# Patient Record
Sex: FEMALE | Race: WHITE | Employment: OTHER | URBAN - METROPOLITAN AREA
[De-identification: names, ages, dates, MRNs, and addresses within clinical notes are randomized per-mention and may not be internally consistent; named-entity substitution may affect disease eponyms.]

---

## 2024-05-01 ENCOUNTER — APPOINTMENT (OUTPATIENT)
Dept: CT IMAGING | Age: 59
End: 2024-05-01
Payer: MEDICAID

## 2024-05-01 ENCOUNTER — APPOINTMENT (OUTPATIENT)
Dept: GENERAL RADIOLOGY | Age: 59
End: 2024-05-01
Payer: MEDICAID

## 2024-05-01 ENCOUNTER — HOSPITAL ENCOUNTER (EMERGENCY)
Age: 59
Discharge: HOME | End: 2024-05-01
Payer: MEDICAID

## 2024-05-01 VITALS
SYSTOLIC BLOOD PRESSURE: 134 MMHG | HEART RATE: 90 BPM | DIASTOLIC BLOOD PRESSURE: 90 MMHG | OXYGEN SATURATION: 95 % | BODY MASS INDEX: 36.29 KG/M2 | HEIGHT: 69 IN | TEMPERATURE: 99 F | RESPIRATION RATE: 17 BRPM | WEIGHT: 245 LBS

## 2024-05-01 DIAGNOSIS — R31.9 HEMATURIA, UNSPECIFIED TYPE: ICD-10-CM

## 2024-05-01 DIAGNOSIS — M54.50 ACUTE BILATERAL LOW BACK PAIN WITHOUT SCIATICA: Primary | ICD-10-CM

## 2024-05-01 DIAGNOSIS — K74.60 HEPATIC CIRRHOSIS, UNSPECIFIED HEPATIC CIRRHOSIS TYPE, UNSPECIFIED WHETHER ASCITES PRESENT (HCC): ICD-10-CM

## 2024-05-01 DIAGNOSIS — N39.0 URINARY TRACT INFECTION IN FEMALE: ICD-10-CM

## 2024-05-01 DIAGNOSIS — Z75.8 DOES NOT HAVE PRIMARY CARE PROVIDER: ICD-10-CM

## 2024-05-01 LAB
APPEARANCE UR: CLEAR
BACTERIA URNS QL MICRO: ABNORMAL /HPF
BILIRUB UR QL: ABNORMAL
CASTS URNS QL MICRO: 0 /LPF
COLOR UR: YELLOW
CRYSTALS URNS QL MICRO: 0 /LPF
EPI CELLS #/AREA URNS HPF: 0 /HPF
GLUCOSE UR STRIP.AUTO-MCNC: 100 MG/DL
HGB UR QL STRIP: NEGATIVE
KETONES UR QL STRIP.AUTO: ABNORMAL MG/DL
LEUKOCYTE ESTERASE UR QL STRIP.AUTO: NEGATIVE
MUCOUS THREADS URNS QL MICRO: 0 /LPF
NITRITE UR QL STRIP.AUTO: POSITIVE
OTHER OBSERVATIONS: ABNORMAL
PH UR STRIP: 5.5 (ref 5–9)
PROT UR STRIP-MCNC: 100 MG/DL
RBC #/AREA URNS HPF: ABNORMAL /HPF
SP GR UR REFRACTOMETRY: >=1.03 (ref 1–1.02)
UROBILINOGEN UR QL STRIP.AUTO: >=8 EU/DL (ref 0.2–1)
WBC URNS QL MICRO: ABNORMAL /HPF

## 2024-05-01 PROCEDURE — 87086 URINE CULTURE/COLONY COUNT: CPT

## 2024-05-01 PROCEDURE — 81001 URINALYSIS AUTO W/SCOPE: CPT

## 2024-05-01 PROCEDURE — 99284 EMERGENCY DEPT VISIT MOD MDM: CPT

## 2024-05-01 PROCEDURE — 72100 X-RAY EXAM L-S SPINE 2/3 VWS: CPT

## 2024-05-01 PROCEDURE — 6370000000 HC RX 637 (ALT 250 FOR IP): Performed by: NURSE PRACTITIONER

## 2024-05-01 PROCEDURE — 6360000002 HC RX W HCPCS: Performed by: NURSE PRACTITIONER

## 2024-05-01 PROCEDURE — 74176 CT ABD & PELVIS W/O CONTRAST: CPT

## 2024-05-01 PROCEDURE — 96372 THER/PROPH/DIAG INJ SC/IM: CPT

## 2024-05-01 RX ORDER — KETOROLAC TROMETHAMINE 15 MG/ML
15 INJECTION, SOLUTION INTRAMUSCULAR; INTRAVENOUS ONCE
Status: COMPLETED | OUTPATIENT
Start: 2024-05-01 | End: 2024-05-01

## 2024-05-01 RX ORDER — OXYCODONE HYDROCHLORIDE 5 MG/1
10 TABLET ORAL ONCE
Status: COMPLETED | OUTPATIENT
Start: 2024-05-01 | End: 2024-05-01

## 2024-05-01 RX ORDER — CEFPODOXIME PROXETIL 100 MG/1
200 TABLET, FILM COATED ORAL 2 TIMES DAILY
Qty: 40 TABLET | Refills: 0 | Status: SHIPPED | OUTPATIENT
Start: 2024-05-01 | End: 2024-05-11

## 2024-05-01 RX ORDER — OXYCODONE HYDROCHLORIDE 5 MG/1
5 TABLET ORAL EVERY 8 HOURS PRN
Qty: 9 TABLET | Refills: 0 | Status: SHIPPED | OUTPATIENT
Start: 2024-05-01 | End: 2024-05-04

## 2024-05-01 RX ADMIN — KETOROLAC TROMETHAMINE 15 MG: 15 INJECTION, SOLUTION INTRAMUSCULAR; INTRAVENOUS at 16:58

## 2024-05-01 RX ADMIN — OXYCODONE 10 MG: 5 TABLET ORAL at 16:57

## 2024-05-01 ASSESSMENT — PAIN SCALES - GENERAL
PAINLEVEL_OUTOF10: 10
PAINLEVEL_OUTOF10: 10

## 2024-05-01 ASSESSMENT — PAIN DESCRIPTION - ORIENTATION: ORIENTATION: LOWER

## 2024-05-01 ASSESSMENT — PAIN DESCRIPTION - LOCATION: LOCATION: BACK

## 2024-05-01 ASSESSMENT — PAIN - FUNCTIONAL ASSESSMENT: PAIN_FUNCTIONAL_ASSESSMENT: 0-10
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
## 2024-05-01 NOTE — ED PROVIDER NOTES
Positive (A) NEG      Leukocyte Esterase, Urine Negative NEG     Urinalysis, Micro   Result Value Ref Range    WBC, UA 0-3 0 /hpf    RBC, UA 0-3 0 /hpf    Epithelial Cells UA 0 0 /hpf    BACTERIA, URINE 1+ (H) 0 /hpf    Casts 0 0 /lpf    Crystals 0 0 /LPF    Mucus, UA 0 0 /lpf    Other observations RESULTS VERIFIED MANUALLY           CT ABDOMEN PELVIS RENAL STONE   Final Result   No acute findings. Cirrhosis of the liver noted with evidence of   portal hypertension      Cholelithiasis         ** If there are any questions about this report, I can be reached on   [employer]**      XR LUMBAR SPINE (2-3 VIEWS)   Final Result   No acute radiographic abnormality.                      No results for input(s): \"COVID19\" in the last 72 hours.    Voice dictation software was used during the making of this note.  This software is not perfect and grammatical and other typographical errors may be present.  This note has not been completely proofread for errors.     Cornel Beal, JULIA - CNP  05/01/24 2021

## 2024-05-04 LAB
BACTERIA SPEC CULT: NORMAL
BACTERIA SPEC CULT: NORMAL
SERVICE CMNT-IMP: NORMAL

## 2024-05-04 NOTE — ED NOTES
Phone call received from Serge in [facility] regarding patient's prescription for oxycodone 5 from the ER yesterday.    Patient just filled her prescription of oxycodone 30 mg tablets.    I instructed the pharmacist to discard her prescription for oxycodone fives     Timoteo Funes MD  05/03/24 2002

## 2024-05-18 ENCOUNTER — HOSPITAL ENCOUNTER (EMERGENCY)
Age: 59
Discharge: HOME | End: 2024-05-19
Attending: EMERGENCY MEDICINE
Payer: MEDICAID

## 2024-05-18 DIAGNOSIS — S39.012A STRAIN OF LUMBAR REGION, INITIAL ENCOUNTER: Primary | ICD-10-CM

## 2024-05-18 PROCEDURE — 96372 THER/PROPH/DIAG INJ SC/IM: CPT

## 2024-05-18 PROCEDURE — 99284 EMERGENCY DEPT VISIT MOD MDM: CPT

## 2024-05-18 ASSESSMENT — PAIN DESCRIPTION - LOCATION: LOCATION: BACK

## 2024-05-18 ASSESSMENT — PAIN SCALES - GENERAL: PAINLEVEL_OUTOF10: 10

## 2024-05-18 ASSESSMENT — PAIN DESCRIPTION - DESCRIPTORS: DESCRIPTORS: THROBBING

## 2024-05-18 ASSESSMENT — PAIN - FUNCTIONAL ASSESSMENT: PAIN_FUNCTIONAL_ASSESSMENT: 0-10

## 2024-05-18 ASSESSMENT — PAIN DESCRIPTION - ORIENTATION: ORIENTATION: LOWER

## 2024-05-19 VITALS
TEMPERATURE: 99.3 F | OXYGEN SATURATION: 98 % | WEIGHT: 245 LBS | SYSTOLIC BLOOD PRESSURE: 129 MMHG | DIASTOLIC BLOOD PRESSURE: 82 MMHG | BODY MASS INDEX: 36.29 KG/M2 | RESPIRATION RATE: 18 BRPM | HEIGHT: 69 IN | HEART RATE: 99 BPM

## 2024-05-19 PROCEDURE — 6360000002 HC RX W HCPCS: Performed by: EMERGENCY MEDICINE

## 2024-05-19 RX ORDER — KETOROLAC TROMETHAMINE 30 MG/ML
30 INJECTION, SOLUTION INTRAMUSCULAR; INTRAVENOUS
Status: COMPLETED | OUTPATIENT
Start: 2024-05-19 | End: 2024-05-19

## 2024-05-19 RX ORDER — DEXAMETHASONE 6 MG/1
6 TABLET ORAL DAILY
Qty: 4 TABLET | Refills: 0 | Status: SHIPPED | OUTPATIENT
Start: 2024-05-19 | End: 2024-05-23

## 2024-05-19 RX ORDER — METHOCARBAMOL 750 MG/1
750 TABLET, FILM COATED ORAL 4 TIMES DAILY
Qty: 20 TABLET | Refills: 2 | Status: SHIPPED | OUTPATIENT
Start: 2024-05-19 | End: 2024-05-29

## 2024-05-19 RX ORDER — DEXAMETHASONE 4 MG/1
6 TABLET ORAL
Status: COMPLETED | OUTPATIENT
Start: 2024-05-19 | End: 2024-05-19

## 2024-05-19 RX ADMIN — DEXAMETHASONE 6 MG: 4 TABLET ORAL at 00:27

## 2024-05-19 RX ADMIN — KETOROLAC TROMETHAMINE 30 MG: 30 INJECTION, SOLUTION INTRAMUSCULAR at 00:46

## 2024-05-19 NOTE — ED PROVIDER NOTES
Behavior normal.        Procedures     Procedures    Orders Placed This Encounter   Procedures    MRI LUMBAR SPINE WO CONTRAST        Medications given during this emergency department visit:  Medications   dexAMETHasone (DECADRON) tablet 6 mg (has no administration in time range)       New Prescriptions    DEXAMETHASONE (DECADRON) 6 MG TABLET    Take 1 tablet by mouth daily for 4 days    METHOCARBAMOL (ROBAXIN-750) 750 MG TABLET    Take 1 tablet by mouth 4 times daily for 10 days        No past medical history on file.     No past surgical history on file.     Social History     Socioeconomic History    Marital status: Legally         Previous Medications    No medications on file        No results found for any visits on 05/18/24.      MRI LUMBAR SPINE WO CONTRAST    (Results Pending)                No results for input(s): \"COVID19\" in the last 72 hours.    Voice dictation software was used during the making of this note.  This software is not perfect and grammatical and other typographical errors may be present.  This note has not been completely proofread for errors.     Angel Samuels MD  05/19/24 0025

## 2024-05-19 NOTE — DISCHARGE INSTRUCTIONS
Follow-up with your doctor in [place] after your outpatient MRI.  Return to the emergency department if your symptoms worsen despite the prescribed medicines.

## 2024-05-19 NOTE — ED TRIAGE NOTES
Patient in w/c into triage.  Patient states she has been in pain in lower back.  Patient was here on 5/1 w/back pain but was diagnosed w/UTI.  She finished all her antibiotic and her back is in more pain. Pain is 10/10.  Patient has been taking her oxycodone 30mg 5xday and pain is still 10/10. Patient states having trouble walking the pain is so intense.

## 2024-05-28 ENCOUNTER — TELEPHONE (OUTPATIENT)
Dept: UROLOGY | Age: 59
End: 2024-05-28

## 2024-05-30 ENCOUNTER — HOSPITAL ENCOUNTER (EMERGENCY)
Age: 59
Discharge: HOME | End: 2024-05-31
Attending: EMERGENCY MEDICINE
Payer: MEDICAID

## 2024-05-30 VITALS
TEMPERATURE: 98.6 F | SYSTOLIC BLOOD PRESSURE: 133 MMHG | RESPIRATION RATE: 18 BRPM | HEART RATE: 99 BPM | OXYGEN SATURATION: 98 % | BODY MASS INDEX: 36.36 KG/M2 | WEIGHT: 246.2 LBS | DIASTOLIC BLOOD PRESSURE: 89 MMHG

## 2024-05-30 DIAGNOSIS — G89.29 CHRONIC BILATERAL LOW BACK PAIN, UNSPECIFIED WHETHER SCIATICA PRESENT: Primary | ICD-10-CM

## 2024-05-30 DIAGNOSIS — M54.50 CHRONIC BILATERAL LOW BACK PAIN, UNSPECIFIED WHETHER SCIATICA PRESENT: Primary | ICD-10-CM

## 2024-05-30 PROCEDURE — 99284 EMERGENCY DEPT VISIT MOD MDM: CPT

## 2024-05-30 ASSESSMENT — PAIN - FUNCTIONAL ASSESSMENT: PAIN_FUNCTIONAL_ASSESSMENT: 0-10

## 2024-05-30 ASSESSMENT — PAIN SCALES - GENERAL: PAINLEVEL_OUTOF10: 10

## 2024-05-31 LAB
APPEARANCE UR: CLEAR
BILIRUB UR QL: NEGATIVE
COLOR UR: YELLOW
GLUCOSE UR STRIP.AUTO-MCNC: 1000 MG/DL
HGB UR QL STRIP: NEGATIVE
KETONES UR QL STRIP.AUTO: NEGATIVE MG/DL
LEUKOCYTE ESTERASE UR QL STRIP.AUTO: NEGATIVE
NITRITE UR QL STRIP.AUTO: NEGATIVE
PH UR STRIP: 6 (ref 5–9)
PROT UR STRIP-MCNC: NEGATIVE MG/DL
SP GR UR REFRACTOMETRY: 1.01 (ref 1–1.02)
UROBILINOGEN UR QL STRIP.AUTO: 1 EU/DL (ref 0.2–1)

## 2024-05-31 PROCEDURE — 6370000000 HC RX 637 (ALT 250 FOR IP): Performed by: EMERGENCY MEDICINE

## 2024-05-31 PROCEDURE — 81003 URINALYSIS AUTO W/O SCOPE: CPT

## 2024-05-31 PROCEDURE — 6360000002 HC RX W HCPCS: Performed by: EMERGENCY MEDICINE

## 2024-05-31 PROCEDURE — 96372 THER/PROPH/DIAG INJ SC/IM: CPT

## 2024-05-31 RX ORDER — LIDOCAINE 4 G/G
1 PATCH TOPICAL
Status: DISCONTINUED | OUTPATIENT
Start: 2024-05-31 | End: 2024-05-31 | Stop reason: HOSPADM

## 2024-05-31 RX ORDER — KETOROLAC TROMETHAMINE 15 MG/ML
15 INJECTION, SOLUTION INTRAMUSCULAR; INTRAVENOUS ONCE
Status: COMPLETED | OUTPATIENT
Start: 2024-05-31 | End: 2024-05-31

## 2024-05-31 RX ADMIN — KETOROLAC TROMETHAMINE 15 MG: 15 INJECTION, SOLUTION INTRAMUSCULAR; INTRAVENOUS at 01:30

## 2024-05-31 NOTE — ED TRIAGE NOTES
Pt coming in for chronic back pain that she hasn't been able to get her MRI for. Pt states she got her MRI cancelled today. Pt states she's coming in for MRI here tonight.

## 2024-05-31 NOTE — DISCHARGE INSTRUCTIONS
If you have leg weakness, cannot control your bowel or bladder, or if you have any other concerning symptoms, please return to the ER immediately.

## 2024-05-31 NOTE — ED PROVIDER NOTES
Emergency Department Provider Note       PCP: No, Pcp   Age: 59 y.o.   Sex: adult     DISPOSITION Decision To Discharge 05/31/2024 01:29:21 AM       ICD-10-CM    1. Chronic bilateral low back pain, unspecified whether sciatica present  M54.50     G89.29           Medical Decision Making     Patient comes to the ED  for evaluation of lumbar back pain that has been ongoing for the past 2 to 3 months.  Patient denies any new injury.  She denies any UTI symptoms.  Patient is asking for an MRI, stating she has been unable to obtain this imaging study otherwise.  Patient denies leg weakness.  She is ambulatory without difficulty.  No saddle paresthesias or signs of cauda equina.  Patient without abdominal TTP.  Urinalysis unremarkable.  Patient given Lidoderm patch and Toradol injection.  She is stable for DC at this time.  Strict return precautions discussed.     1 chronic illness with exacerbation.    Over the counter drug management performed.  Shared medical decision making was utilized in creating the patients health plan today.    I independently ordered and reviewed each unique test.    I reviewed external records: Patient seen in ED on 5/18/2024 and on 5/1/2024.  Both visits for back pain.          History     Patient comes to the ED  for evaluation of lumbar back pain that has been ongoing for the past 2 to 3 months.  Patient denies any new injury.  She denies any UTI symptoms.  Patient is asking for an MRI, stating she has been unable to obtain this imaging study otherwise.  Patient denies leg weakness.  She is ambulatory without difficulty.  No saddle paresthesias or signs of cauda equina.  Patient without abdominal TTP.    The history is provided by the patient and medical records. No  was used.     Physical Exam     Vitals signs and nursing note reviewed:  Vitals:    05/30/24 2012   BP: 133/89   Pulse: 99   Resp: 18   Temp: 98.6 °F (37 °C)   TempSrc: Oral   SpO2: 98%   Weight: 111.7 kg  Pt hourly rounding competed. Safety   Pt (x) resting on stretcher with side rails up and call bell in reach. () in chair    () in parents arms. Toileting   Pt offered ()Bedpan     ()Assistance to Restroom     ()Urinal  Ongoing Updates  Updated on plan of care and status of test results.   Pain Management  Inquired as to comfort and offered comfort measures:    () warm blankets   () dimmed lights (246 lb 3.2 oz)      Physical Exam  Vitals and nursing note reviewed.   Constitutional:       General: She is not in acute distress.     Appearance: Normal appearance. She is not ill-appearing or toxic-appearing.   HENT:      Head: Normocephalic and atraumatic.      Nose: Nose normal.      Mouth/Throat:      Mouth: Mucous membranes are moist.   Eyes:      Extraocular Movements: Extraocular movements intact.   Cardiovascular:      Rate and Rhythm: Normal rate.   Pulmonary:      Effort: Pulmonary effort is normal. No respiratory distress.   Abdominal:      Palpations: Abdomen is soft.   Musculoskeletal:         General: Normal range of motion.      Cervical back: Normal range of motion.   Skin:     General: Skin is warm and dry.      Capillary Refill: Capillary refill takes less than 2 seconds.   Neurological:      General: No focal deficit present.      Mental Status: She is alert and oriented to person, place, and time.      Cranial Nerves: No cranial nerve deficit.      Motor: No weakness.      Gait: Gait normal.   Psychiatric:         Mood and Affect: Mood is anxious.        Procedures     Procedures    Orders Placed This Encounter   Procedures    Urinalysis        Medications given during this emergency department visit:  Medications   lidocaine 4 % external patch 1 patch (1 patch TransDERmal Patch Applied 5/31/24 0108)   ketorolac (TORADOL) injection 15 mg (15 mg IntraMUSCular Given 5/31/24 0130)       New Prescriptions    No medications on file        No past medical history on file.     No past surgical history on file.     Social History     Socioeconomic History    Marital status:         Previous Medications    No medications on file        Results for orders placed or performed during the hospital encounter of 05/30/24   Urinalysis   Result Value Ref Range    Color, UA YELLOW      Appearance CLEAR      Specific Gravity, UA 1.015 1.001 - 1.023      pH, Urine 6.0 5.0 - 9.0      Protein, UA Negative

## 2024-06-13 ENCOUNTER — HOSPITAL ENCOUNTER (EMERGENCY)
Age: 59
Discharge: HOME | End: 2024-06-13
Attending: EMERGENCY MEDICINE
Payer: MEDICAID

## 2024-06-13 ENCOUNTER — APPOINTMENT (OUTPATIENT)
Dept: ULTRASOUND IMAGING | Age: 59
End: 2024-06-13
Payer: MEDICAID

## 2024-06-13 VITALS
SYSTOLIC BLOOD PRESSURE: 106 MMHG | TEMPERATURE: 98.7 F | OXYGEN SATURATION: 96 % | DIASTOLIC BLOOD PRESSURE: 65 MMHG | WEIGHT: 251 LBS | RESPIRATION RATE: 16 BRPM | HEART RATE: 87 BPM | BODY MASS INDEX: 37.07 KG/M2

## 2024-06-13 DIAGNOSIS — M79.605 PAIN OF LEFT LOWER EXTREMITY: Primary | ICD-10-CM

## 2024-06-13 DIAGNOSIS — M54.32 SCIATICA OF LEFT SIDE: ICD-10-CM

## 2024-06-13 PROCEDURE — 96372 THER/PROPH/DIAG INJ SC/IM: CPT

## 2024-06-13 PROCEDURE — 93971 EXTREMITY STUDY: CPT

## 2024-06-13 PROCEDURE — 99284 EMERGENCY DEPT VISIT MOD MDM: CPT

## 2024-06-13 PROCEDURE — 6360000002 HC RX W HCPCS: Performed by: EMERGENCY MEDICINE

## 2024-06-13 RX ORDER — KETOROLAC TROMETHAMINE 30 MG/ML
60 INJECTION, SOLUTION INTRAMUSCULAR; INTRAVENOUS
Status: DISCONTINUED | OUTPATIENT
Start: 2024-06-13 | End: 2024-06-13

## 2024-06-13 RX ORDER — KETOROLAC TROMETHAMINE 30 MG/ML
30 INJECTION, SOLUTION INTRAMUSCULAR; INTRAVENOUS
Status: COMPLETED | OUTPATIENT
Start: 2024-06-13 | End: 2024-06-13

## 2024-06-13 RX ADMIN — KETOROLAC TROMETHAMINE 30 MG: 30 INJECTION, SOLUTION INTRAMUSCULAR at 16:36

## 2024-06-13 ASSESSMENT — ENCOUNTER SYMPTOMS
ABDOMINAL DISTENTION: 0
WHEEZING: 0
CHEST TIGHTNESS: 0
CONSTIPATION: 0
COLOR CHANGE: 0
EYE REDNESS: 0
COUGH: 0
VOICE CHANGE: 0
BACK PAIN: 0
ABDOMINAL PAIN: 0

## 2024-06-13 ASSESSMENT — PAIN - FUNCTIONAL ASSESSMENT: PAIN_FUNCTIONAL_ASSESSMENT: 0-10

## 2024-06-13 ASSESSMENT — PAIN SCALES - GENERAL
PAINLEVEL_OUTOF10: 10
PAINLEVEL_OUTOF10: 10

## 2024-06-13 NOTE — DISCHARGE INSTRUCTIONS
As discussed ultrasound done today shows no signs of any serious or life-threatening illnesses.  I believe the pain in your leg is related to your chronic back issue  I placed referral with orthopedic spine service  Return to the ER for any new, worsening or life-threatening send

## 2024-06-13 NOTE — ED PROVIDER NOTES
Emergency Department Provider Note       PCP: No, Pcp   Age: 59 y.o.   Sex: adult     DISPOSITION Decision To Discharge 06/13/2024 05:43:30 PM       ICD-10-CM    1. Pain of left lower extremity  M79.605       2. Sciatica of left side  M54.32 [facility] (Spine Surgery)          Medical Decision Making     Patient is  well-appearing.  Is neurovascular intact distally.  Possibly some left lower extremity isolated swelling.  Therefore will obtain ultrasound to rule out DVT.  She clinically is well-appearing.  Sitting up eating fast food upon my history and physical    5:44 PM EDT  Ultrasound is negative for DVT.  Discussed with patient how her symptoms are likely related to her chronic back disease and her sciatica    She reports her MRI of her spine done recently voicing concerns were just inflammation versus infection.    She does not have the report but has a message sent from her PCP and says that there was questionable discitis but no signs of epidural abscess or hematoma.    The nature of her pain has been chronic.  She denies any fevers or chills.  Do not feel she needs any workup for this.  Her PCP did place referral for spine.  I will give her information placed referral for spine surgery through our system.  However not feel she needs any further testing at this time given her well appearance    Pain is completely resolved after dose of IM Toradol    Return to the ER for any new, worsening or life-threatening symptoms     1 chronic illness with exacerbation.  Chronic medical problems impacting care include chronic back pain.    I independently ordered and reviewed each unique test.  I reviewed external records: ED visit note from an outside group.  I reviewed external records: provider visit note from PCP.  I reviewed external records: provider visit note from outside specialist.  I reviewed external records: previous EKG including cardiologist interpretation.    I reviewed

## 2024-06-13 NOTE — ED TRIAGE NOTES
Pt coming in for back pain related to possible infection according to her MRI results per Pt. Pt also here for blood clot rule out of her left leg. Pt states it has become swollen and has fire to it whenever she gets a shower. Pt ambulatory with steady gait in triage.

## 2024-07-13 ENCOUNTER — APPOINTMENT (OUTPATIENT)
Dept: GENERAL RADIOLOGY | Age: 59
End: 2024-07-13
Payer: MEDICAID

## 2024-07-13 ENCOUNTER — HOSPITAL ENCOUNTER (EMERGENCY)
Age: 59
Discharge: HOME | End: 2024-07-13
Payer: MEDICAID

## 2024-07-13 ENCOUNTER — APPOINTMENT (OUTPATIENT)
Dept: CT IMAGING | Age: 59
End: 2024-07-13
Payer: MEDICAID

## 2024-07-13 VITALS
OXYGEN SATURATION: 98 % | SYSTOLIC BLOOD PRESSURE: 138 MMHG | WEIGHT: 240 LBS | BODY MASS INDEX: 35.55 KG/M2 | HEART RATE: 88 BPM | HEIGHT: 69 IN | RESPIRATION RATE: 17 BRPM | DIASTOLIC BLOOD PRESSURE: 88 MMHG | TEMPERATURE: 99.3 F

## 2024-07-13 DIAGNOSIS — S92.352A DISPLACED FRACTURE OF FIFTH METATARSAL BONE, LEFT FOOT, INITIAL ENCOUNTER FOR CLOSED FRACTURE: ICD-10-CM

## 2024-07-13 DIAGNOSIS — S09.90XA INJURY OF HEAD, INITIAL ENCOUNTER: Primary | ICD-10-CM

## 2024-07-13 DIAGNOSIS — M25.571 ACUTE RIGHT ANKLE PAIN: ICD-10-CM

## 2024-07-13 DIAGNOSIS — S92.505A CLOSED NONDISPLACED FRACTURE OF PHALANX OF LESSER TOE OF LEFT FOOT, UNSPECIFIED PHALANX, INITIAL ENCOUNTER: ICD-10-CM

## 2024-07-13 PROCEDURE — 73630 X-RAY EXAM OF FOOT: CPT

## 2024-07-13 PROCEDURE — 6370000000 HC RX 637 (ALT 250 FOR IP): Performed by: NURSE PRACTITIONER

## 2024-07-13 PROCEDURE — 73610 X-RAY EXAM OF ANKLE: CPT

## 2024-07-13 PROCEDURE — 70450 CT HEAD/BRAIN W/O DYE: CPT

## 2024-07-13 PROCEDURE — 99284 EMERGENCY DEPT VISIT MOD MDM: CPT

## 2024-07-13 RX ORDER — ACETAMINOPHEN 500 MG
1000 TABLET ORAL
Status: COMPLETED | OUTPATIENT
Start: 2024-07-13 | End: 2024-07-13

## 2024-07-13 RX ORDER — OXYCODONE HYDROCHLORIDE 5 MG/1
5 TABLET ORAL
Status: COMPLETED | OUTPATIENT
Start: 2024-07-13 | End: 2024-07-13

## 2024-07-13 RX ORDER — HYDROCODONE BITARTRATE AND ACETAMINOPHEN 5; 325 MG/1; MG/1
1 TABLET ORAL EVERY 8 HOURS PRN
Qty: 10 TABLET | Refills: 0 | Status: SHIPPED | OUTPATIENT
Start: 2024-07-13 | End: 2024-07-16

## 2024-07-13 RX ORDER — KETOROLAC TROMETHAMINE 15 MG/ML
15 INJECTION, SOLUTION INTRAMUSCULAR; INTRAVENOUS ONCE
Status: DISCONTINUED | OUTPATIENT
Start: 2024-07-13 | End: 2024-07-13

## 2024-07-13 RX ADMIN — ACETAMINOPHEN 1000 MG: 500 TABLET ORAL at 17:09

## 2024-07-13 RX ADMIN — OXYCODONE 5 MG: 5 TABLET ORAL at 19:30

## 2024-07-13 ASSESSMENT — PAIN SCALES - GENERAL
PAINLEVEL_OUTOF10: 9
PAINLEVEL_OUTOF10: 8
PAINLEVEL_OUTOF10: 9

## 2024-07-13 ASSESSMENT — PAIN DESCRIPTION - LOCATION: LOCATION: ANKLE;TOE (COMMENT WHICH ONE)

## 2024-07-13 ASSESSMENT — PAIN - FUNCTIONAL ASSESSMENT: PAIN_FUNCTIONAL_ASSESSMENT: 0-10

## 2024-07-13 NOTE — DISCHARGE INSTRUCTIONS
Take medications as prescribed.  Follow-up with recommended provider in the next 1-2 days.  Return to the ED immediately for any new, worsening, concerning symptoms; or for danger signs as discussed.

## 2024-07-13 NOTE — ED TRIAGE NOTES
Pt ambulatory to triage with steady gait. Pt reports right ankle pain that shoots up the leg x2 days and left 4th and 5th toe pain that radiates up the foot and bruising that started yesterday. Pt states \"I thinks I bumped it into something.\" Pt states she took 2 tylenol at 1400 today

## 2024-07-14 NOTE — ED PROVIDER NOTES
Emergency Department Provider Note       PCP: No, Pcp   Age: 59 y.o.   Sex: adult     DISPOSITION Decision To Discharge 07/13/2024 08:08:36 PM       ICD-10-CM    1. Injury of head, initial encounter  S09.90XA       2. Acute right ankle pain  M25.571       3. Closed nondisplaced fracture of phalanx of lesser toe of left foot, unspecified phalanx, initial encounter  S92.505A HYDROcodone-acetaminophen (NORCO) 5-325 MG per tablet     [facility]      4. Displaced fracture of fifth metatarsal bone, left foot, initial encounter for closed fracture  S92.352A HYDROcodone-acetaminophen (NORCO) 5-325 MG per tablet     [facility]          Medical Decision Making     As in HPI.  States extension injury several days ago.  Denies knowledge of other.  No neck or back pain.  No chest or abdominal pain.  No shortness of breath.  No nausea or vomiting.  No dizziness or lightheadedness.  Denies falls.  No neck or back pain.  Pleasant well-appearing.  Conversational and jovial, eating and drinking, ambulatory without difficulty.  Lengthy discussion with patient regarding her injuries, she denies any concerns for safety, states she is safe at home and declines offer of any further assistance regarding this.  X-ray was obtained of the left foot and the right ankle.  There is fracture at the base of the left fourth toe and radiologist reads healed fracture of the left fifth metatarsal, though with injuries and tenderness at site I do have concern for acute injury.  Discussed with ED attending regarding management.  No acute process noted on x-ray of the right ankle and CT of the head was added on and there is no acute process.  Toe fracture managed with mi tape and placed in walking boot, provided crutches and advised to not bear weight on the affected foot until follow-up with orthopedics.  Have referred to orthopedics for close follow-up.  Will prescribe short course of

## 2024-07-15 ENCOUNTER — TELEPHONE (OUTPATIENT)
Dept: ORTHOPEDIC SURGERY | Age: 59
End: 2024-07-15

## 2024-07-16 ENCOUNTER — TELEPHONE (OUTPATIENT)
Dept: ORTHOPEDIC SURGERY | Age: 59
End: 2024-07-16

## 2024-08-26 ENCOUNTER — HOSPITAL ENCOUNTER (EMERGENCY)
Age: 59
Discharge: HOME | End: 2024-08-27
Attending: EMERGENCY MEDICINE
Payer: MEDICAID

## 2024-08-26 VITALS
TEMPERATURE: 99.2 F | WEIGHT: 230 LBS | DIASTOLIC BLOOD PRESSURE: 66 MMHG | RESPIRATION RATE: 19 BRPM | SYSTOLIC BLOOD PRESSURE: 131 MMHG | BODY MASS INDEX: 34.07 KG/M2 | HEIGHT: 69 IN | OXYGEN SATURATION: 98 % | HEART RATE: 87 BPM
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
## 2024-08-26 PROCEDURE — 99284 EMERGENCY DEPT VISIT MOD MDM: CPT

## 2024-08-26 ASSESSMENT — PAIN SCALES - GENERAL: PAINLEVEL_OUTOF10: 10

## 2024-08-26 ASSESSMENT — PAIN - FUNCTIONAL ASSESSMENT: PAIN_FUNCTIONAL_ASSESSMENT: 0-10

## 2024-08-27 PROCEDURE — 90471 IMMUNIZATION ADMIN: CPT | Performed by: EMERGENCY MEDICINE

## 2024-08-27 PROCEDURE — 6370000000 HC RX 637 (ALT 250 FOR IP): Performed by: EMERGENCY MEDICINE

## 2024-08-27 PROCEDURE — 90714 TD VACC NO PRESV 7 YRS+ IM: CPT | Performed by: EMERGENCY MEDICINE

## 2024-08-27 PROCEDURE — 6360000002 HC RX W HCPCS: Performed by: EMERGENCY MEDICINE

## 2024-08-27 RX ADMIN — AMOXICILLIN AND CLAVULANATE POTASSIUM 1 TABLET: 875; 125 TABLET, FILM COATED ORAL at 00:53

## 2024-08-27 RX ADMIN — CLOSTRIDIUM TETANI TOXOID ANTIGEN (FORMALDEHYDE INACTIVATED) AND CORYNEBACTERIUM DIPHTHERIAE TOXOID ANTIGEN (FORMALDEHYDE INACTIVATED) 0.5 ML: 5; 2 INJECTION, SUSPENSION INTRAMUSCULAR at 00:53

## 2024-08-27 NOTE — ED TRIAGE NOTES
Pt ambulatory to triage with steady gait by self. Pt c/o dog bite to right lower leg. Bite acquired on Friday night. Pt presents with puncture would and redness to right dorsal knee. Unknown if dog up to date of vaccines. Unknown last tetanus.

## 2024-08-27 NOTE — ED PROVIDER NOTES
Emergency Department Provider Note       PCP: No, Pcp   Age: 59 y.o.   Sex: adult     DISPOSITION Decision To Discharge 08/27/2024 12:41:56 AM  Condition at Disposition: Stable       ICD-10-CM    1. Dog bite, initial encounter  W54.0XXA           Medical Decision Making     Patient comes to the ED for evaluation of a dog bite that occurred 4 days ago.  Patient states she went with her boyfriend to one of his friends houses (patient does know the address of the house where the dog lives), where a dog bit her behind her right knee 4 days ago.  Patient is unsure if her tetanus is up-to-date.  She is ambulatory in the ED without difficulty.         Patient filled out appropriate paperwork.  Given that she is aware on the address of the dog, will not refer for rabies prophylaxis at this time.  Patient's tetanus updated.  Patient started on Augmentin.  Prescription for home.  She is stable for DC at this time.  Strict return precautions discussed.    1 acute complicated illness or injury.    Over the counter drug management performed.  Prescription drug management performed.  Shared medical decision making was utilized in creating the patients health plan today.    I independently ordered and reviewed each unique test.    I reviewed external records: Pt seen in ED on 5/31/24, ED note reviewed.        History     Patient comes to the ED for evaluation of a dog bite that occurred 4 days ago.    The history is provided by the patient and medical records. No  was used.     Physical Exam     Vitals signs and nursing note reviewed:  Vitals:    08/26/24 2311 08/26/24 2315   BP: 131/66    Pulse: 87    Resp: 19    Temp: 99.2 °F (37.3 °C)    TempSrc: Oral    SpO2: 98%    Weight:  104.3 kg (230 lb)   Height: 1.753 m (5' 9\")       Physical Exam  Vitals and nursing note reviewed.   Constitutional:       General: She is not in acute distress.     Appearance: She is obese. She is not ill-appearing, toxic-appearing

## 2024-08-27 NOTE — DISCHARGE INSTRUCTIONS
Your antibiotic prescription was sent to [facility].  Continue to put antibacterial ointment on your leg.  If you develop a fever, difficulty walking, difficulty bending your knee, or any other concerning symptoms, please return to the ER immediately.

## 2024-09-05 ENCOUNTER — HOSPITAL ENCOUNTER (EMERGENCY)
Age: 59
Discharge: HOME | End: 2024-09-06
Attending: EMERGENCY MEDICINE
Payer: MEDICAID

## 2024-09-05 VITALS
TEMPERATURE: 99.1 F | HEIGHT: 69 IN | SYSTOLIC BLOOD PRESSURE: 95 MMHG | OXYGEN SATURATION: 94 % | BODY MASS INDEX: 34.8 KG/M2 | HEART RATE: 89 BPM | DIASTOLIC BLOOD PRESSURE: 64 MMHG | RESPIRATION RATE: 15 BRPM | WEIGHT: 235 LBS

## 2024-09-05 DIAGNOSIS — R73.9 HYPERGLYCEMIA: ICD-10-CM

## 2024-09-05 DIAGNOSIS — F15.929 METHAMPHETAMINE INTOXICATION (HCC): Primary | ICD-10-CM

## 2024-09-05 LAB
ALBUMIN SERPL-MCNC: 2.9 G/DL (ref 3.5–5)
ALBUMIN/GLOB SERPL: 0.8 (ref 0.4–1.6)
ALP SERPL-CCNC: 179 U/L (ref 45–117)
ALT SERPL-CCNC: 9 U/L (ref 13–61)
AMPHET UR QL SCN: POSITIVE
ANION GAP SERPL CALC-SCNC: 8 MMOL/L (ref 9–18)
APPEARANCE UR: CLEAR
AST SERPL-CCNC: 28 U/L (ref 15–37)
BARBITURATES UR QL SCN: NEGATIVE
BASOPHILS # BLD: 0 K/UL (ref 0–0.2)
BASOPHILS NFR BLD: 0 % (ref 0–2)
BENZODIAZ UR QL: NEGATIVE
BILIRUB SERPL-MCNC: 2.2 MG/DL (ref 0.2–1.1)
BILIRUB UR QL: NEGATIVE
BUN SERPL-MCNC: 6 MG/DL (ref 6–23)
CALCIUM SERPL-MCNC: 8.2 MG/DL (ref 8.3–10.4)
CANNABINOIDS UR QL SCN: NEGATIVE
CHLORIDE SERPL-SCNC: 95 MMOL/L (ref 98–107)
CO2 SERPL-SCNC: 27 MMOL/L (ref 21–32)
COCAINE UR QL SCN: NEGATIVE
COLOR UR: YELLOW
CREAT SERPL-MCNC: 0.65 MG/DL (ref 0.6–1)
DIFFERENTIAL METHOD BLD: ABNORMAL
EOSINOPHIL # BLD: 0 K/UL (ref 0–0.8)
EOSINOPHIL NFR BLD: 1 % (ref 0.5–7.8)
ERYTHROCYTE [DISTWIDTH] IN BLOOD BY AUTOMATED COUNT: 13.4 % (ref 11.9–14.6)
GLOBULIN SER CALC-MCNC: 3.5 G/DL (ref 2.8–4.5)
GLUCOSE SERPL-MCNC: 329 MG/DL (ref 65–100)
GLUCOSE UR STRIP.AUTO-MCNC: 500 MG/DL
HCT VFR BLD AUTO: 41.1 % (ref 35.8–46.3)
HGB BLD-MCNC: 14.7 G/DL (ref 11.7–15.4)
HGB UR QL STRIP: NEGATIVE
IMM GRANULOCYTES # BLD AUTO: 0 K/UL (ref 0–0.5)
IMM GRANULOCYTES NFR BLD AUTO: 0 % (ref 0–5)
KETONES UR QL STRIP.AUTO: NEGATIVE MG/DL
LEUKOCYTE ESTERASE UR QL STRIP.AUTO: NEGATIVE
LIPASE SERPL-CCNC: 12 U/L (ref 13–60)
LYMPHOCYTES # BLD: 1.4 K/UL (ref 0.5–4.6)
LYMPHOCYTES NFR BLD: 16 % (ref 13–44)
MCH RBC QN AUTO: 31.3 PG (ref 26.1–32.9)
MCHC RBC AUTO-ENTMCNC: 35.8 G/DL (ref 31.4–35)
MCV RBC AUTO: 87.4 FL (ref 82–102)
METHADONE UR QL: NEGATIVE
MONOCYTES # BLD: 0.9 K/UL (ref 0.1–1.3)
MONOCYTES NFR BLD: 11 % (ref 4–12)
NEUTS SEG # BLD: 5.9 K/UL (ref 1.7–8.2)
NEUTS SEG NFR BLD: 72 % (ref 43–78)
NITRITE UR QL STRIP.AUTO: NEGATIVE
NRBC # BLD: 0 K/UL (ref 0–0.2)
OPIATES UR QL: NEGATIVE
PCP UR QL: NEGATIVE
PH UR STRIP: 6 (ref 5–9)
PLATELET # BLD AUTO: 173 K/UL (ref 150–450)
PMV BLD AUTO: 9.7 FL (ref 9.4–12.3)
POTASSIUM SERPL-SCNC: 3.8 MMOL/L (ref 3.5–5.1)
PROT SERPL-MCNC: 6.4 G/DL (ref 6.4–8.2)
PROT UR STRIP-MCNC: NEGATIVE MG/DL
RBC # BLD AUTO: 4.7 M/UL (ref 4.05–5.2)
SODIUM SERPL-SCNC: 130 MMOL/L (ref 133–143)
SP GR UR REFRACTOMETRY: 1.01 (ref 1–1.02)
UROBILINOGEN UR QL STRIP.AUTO: 0.2 EU/DL (ref 0.2–1)
WBC # BLD AUTO: 8.2 K/UL (ref 4.3–11.1)

## 2024-09-05 PROCEDURE — 83690 ASSAY OF LIPASE: CPT

## 2024-09-05 PROCEDURE — 2580000003 HC RX 258: Performed by: EMERGENCY MEDICINE

## 2024-09-05 PROCEDURE — 99284 EMERGENCY DEPT VISIT MOD MDM: CPT

## 2024-09-05 PROCEDURE — 85025 COMPLETE CBC W/AUTO DIFF WBC: CPT

## 2024-09-05 PROCEDURE — 80053 COMPREHEN METABOLIC PANEL: CPT

## 2024-09-05 PROCEDURE — 81003 URINALYSIS AUTO W/O SCOPE: CPT

## 2024-09-05 PROCEDURE — 96360 HYDRATION IV INFUSION INIT: CPT

## 2024-09-05 PROCEDURE — 96361 HYDRATE IV INFUSION ADD-ON: CPT

## 2024-09-05 PROCEDURE — 80307 DRUG TEST PRSMV CHEM ANLYZR: CPT

## 2024-09-05 RX ORDER — 0.9 % SODIUM CHLORIDE 0.9 %
1000 INTRAVENOUS SOLUTION INTRAVENOUS ONCE
Status: COMPLETED | OUTPATIENT
Start: 2024-09-05 | End: 2024-09-05

## 2024-09-05 RX ADMIN — SODIUM CHLORIDE 1000 ML: 9 INJECTION, SOLUTION INTRAVENOUS at 22:07

## 2024-09-05 ASSESSMENT — PAIN DESCRIPTION - LOCATION: LOCATION: ABDOMEN

## 2024-09-05 ASSESSMENT — PAIN SCALES - GENERAL: PAINLEVEL_OUTOF10: 9

## 2024-09-05 ASSESSMENT — PAIN - FUNCTIONAL ASSESSMENT: PAIN_FUNCTIONAL_ASSESSMENT: 0-10

## 2024-09-05 ASSESSMENT — PAIN DESCRIPTION - DESCRIPTORS: DESCRIPTORS: TIGHTNESS
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
## 2024-09-06 NOTE — ED PROVIDER NOTES
Emergency Department Provider Note       PCP: No, Pcp   Age: 59 y.o.   Sex: adult     DISPOSITION    Condition at Disposition: Data Unavailable     No diagnosis found.    Medical Decision Making     Patient will be worked up further abdominal cramping and concern for accidental ingestion.  ED Course as of 09/05/24 2219   Thu Sep 05, 2024   2219 Amphetamine, Urine(!): Positive  Results the patient's workup positive for amphetamines.  Hyperglycemia without concerning metabolic changes.  Patient was given 1 L IV fluid.  Think patient can be discharged home. [MARTHA]      ED Course User Index  [MARTHA] Shahid Tomlin, DO     1 or more acute illnesses that pose a threat to life or bodily function.   Shared medical decision making was utilized in creating the patients health plan today.  I independently ordered and reviewed each unique test.                         History     Patient is a 59-year-old presenting with complaint of abdominal cramps and concern for unintentional ingestion.  They state that their significant other uses different types of drugs.  They deny any drug use themselves but thinks that they may have accidentally ingested some of their methamphetamine.  Deny any chest pain or shortness of breath.        Physical Exam     Vitals signs and nursing note reviewed:  Vitals:    09/05/24 2103   BP: (!) 109/95   Pulse: 89   Resp: 15   Temp: 99.1 °F (37.3 °C)   TempSrc: Oral   SpO2: 99%   Weight: 106.6 kg (235 lb)   Height: 1.753 m (5' 9\")      Physical Exam  Vitals and nursing note reviewed.   Constitutional:       General: She is not in acute distress.     Appearance: Normal appearance. She is not ill-appearing, toxic-appearing or diaphoretic.   HENT:      Head: Normocephalic and atraumatic.      Right Ear: Tympanic membrane normal.      Left Ear: Tympanic membrane normal.      Nose: Nose normal. No congestion or rhinorrhea.      Mouth/Throat:      Mouth: Mucous membranes are moist.      Pharynx:  NEG      Benzodiazepines, Urine Negative NEG      Cocaine, Urine Negative NEG      Amphetamine, Urine Positive (A) NEG      Methadone, Urine Negative NEG      THC, TH-Cannabinol, Urine Negative NEG      Opiates, Urine Negative NEG      Barbiturates, Urine Negative NEG           No orders to display                No results for input(s): \"COVID19\" in the last 72 hours.     Voice dictation software was used during the making of this note.  This software is not perfect and grammatical and other typographical errors may be present.  This note has not been completely proofread for errors.      Shahid Tomlin, DO  09/05/24 8900

## 2024-09-17 ENCOUNTER — HOSPITAL ENCOUNTER (EMERGENCY)
Age: 59
Discharge: HOME | End: 2024-09-18
Attending: EMERGENCY MEDICINE
Payer: MEDICAID

## 2024-09-17 VITALS
DIASTOLIC BLOOD PRESSURE: 78 MMHG | HEIGHT: 69 IN | TEMPERATURE: 98.1 F | OXYGEN SATURATION: 97 % | RESPIRATION RATE: 22 BRPM | BODY MASS INDEX: 35.25 KG/M2 | HEART RATE: 73 BPM | WEIGHT: 238 LBS | SYSTOLIC BLOOD PRESSURE: 145 MMHG

## 2024-09-17 DIAGNOSIS — Z51.89 ENCOUNTER FOR WOUND RE-CHECK: Primary | ICD-10-CM

## 2024-09-17 DIAGNOSIS — L08.9 RIGHT FOOT INFECTION: ICD-10-CM

## 2024-09-17 LAB
AMPHET UR QL SCN: POSITIVE
BARBITURATES UR QL SCN: NEGATIVE
BENZODIAZ UR QL: NEGATIVE
CANNABINOIDS UR QL SCN: NEGATIVE
COCAINE UR QL SCN: NEGATIVE
METHADONE UR QL: NEGATIVE
OPIATES UR QL: NEGATIVE
PCP UR QL: NEGATIVE

## 2024-09-17 PROCEDURE — 99284 EMERGENCY DEPT VISIT MOD MDM: CPT

## 2024-09-17 PROCEDURE — 80307 DRUG TEST PRSMV CHEM ANLYZR: CPT

## 2024-09-17 RX ORDER — DOXYCYCLINE HYCLATE 100 MG
100 TABLET ORAL 2 TIMES DAILY
Qty: 20 TABLET | Refills: 0 | Status: SHIPPED | OUTPATIENT
Start: 2024-09-17 | End: 2024-09-27

## 2024-09-17 ASSESSMENT — PAIN SCALES - GENERAL: PAINLEVEL_OUTOF10: 8

## 2024-09-17 ASSESSMENT — PAIN - FUNCTIONAL ASSESSMENT: PAIN_FUNCTIONAL_ASSESSMENT: 0-10

## 2024-09-18 ENCOUNTER — APPOINTMENT (OUTPATIENT)
Dept: GENERAL RADIOLOGY | Age: 59
End: 2024-09-18
Payer: MEDICAID

## 2024-09-18 LAB
GLUCOSE BLD STRIP.AUTO-MCNC: 319 MG/DL (ref 65–100)
SERVICE CMNT-IMP: ABNORMAL

## 2024-09-18 PROCEDURE — 73630 X-RAY EXAM OF FOOT: CPT

## 2024-09-18 PROCEDURE — 82962 GLUCOSE BLOOD TEST: CPT

## 2024-09-18 ASSESSMENT — LIFESTYLE VARIABLES
HOW OFTEN DO YOU HAVE A DRINK CONTAINING ALCOHOL: NEVER
HOW MANY STANDARD DRINKS CONTAINING ALCOHOL DO YOU HAVE ON A TYPICAL DAY: PATIENT DOES NOT DRINK

## 2024-12-30 ENCOUNTER — APPOINTMENT (OUTPATIENT)
Dept: GENERAL RADIOLOGY | Age: 59
DRG: 282 | End: 2024-12-30
Payer: MEDICAID

## 2024-12-30 ENCOUNTER — HOSPITAL ENCOUNTER (EMERGENCY)
Age: 59
Discharge: ANOTHER HOSPITAL | DRG: 282 | End: 2024-12-31
Attending: EMERGENCY MEDICINE
Payer: MEDICAID

## 2024-12-30 ENCOUNTER — APPOINTMENT (OUTPATIENT)
Dept: CT IMAGING | Age: 59
DRG: 282 | End: 2024-12-30
Payer: MEDICAID

## 2024-12-30 DIAGNOSIS — K85.10 ACUTE BILIARY PANCREATITIS, UNSPECIFIED COMPLICATION STATUS: Primary | ICD-10-CM

## 2024-12-30 DIAGNOSIS — J18.9 PNEUMONIA OF RIGHT LOWER LOBE DUE TO INFECTIOUS ORGANISM: ICD-10-CM

## 2024-12-30 DIAGNOSIS — K81.9 CHOLECYSTITIS, UNSPECIFIED: ICD-10-CM

## 2024-12-30 LAB
ALBUMIN SERPL-MCNC: 3 G/DL (ref 3.5–5)
ALBUMIN/GLOB SERPL: 0.6 (ref 1–1.9)
ALP SERPL-CCNC: 347 U/L (ref 35–104)
ALT SERPL-CCNC: 47 U/L (ref 12–65)
ANION GAP SERPL CALC-SCNC: 12 MMOL/L (ref 7–16)
AST SERPL-CCNC: 108 U/L (ref 15–37)
BASOPHILS # BLD: 0 K/UL (ref 0–0.2)
BASOPHILS NFR BLD: 0 % (ref 0–2)
BILIRUB SERPL-MCNC: 5.8 MG/DL (ref 0–1.2)
BUN SERPL-MCNC: 5 MG/DL (ref 6–23)
CALCIUM SERPL-MCNC: 9.7 MG/DL (ref 8.8–10.2)
CHLORIDE SERPL-SCNC: 96 MMOL/L (ref 98–107)
CO2 SERPL-SCNC: 23 MMOL/L (ref 20–29)
CREAT SERPL-MCNC: <0.2 MG/DL (ref 0.8–1.3)
DIFFERENTIAL METHOD BLD: ABNORMAL
EOSINOPHIL # BLD: 0 K/UL (ref 0–0.8)
EOSINOPHIL NFR BLD: 0 % (ref 0.5–7.8)
ERYTHROCYTE [DISTWIDTH] IN BLOOD BY AUTOMATED COUNT: 13.2 % (ref 11.9–14.6)
GLOBULIN SER CALC-MCNC: 5 G/DL (ref 2.3–3.5)
GLUCOSE SERPL-MCNC: 234 MG/DL (ref 65–100)
HCT VFR BLD AUTO: 45.3 % (ref 35.8–46.3)
HGB BLD-MCNC: 15.8 G/DL (ref 11.7–15.4)
IMM GRANULOCYTES # BLD AUTO: 0 K/UL (ref 0–0.5)
IMM GRANULOCYTES NFR BLD AUTO: 0 % (ref 0–5)
LIPASE SERPL-CCNC: >3000 U/L (ref 13–60)
LYMPHOCYTES # BLD: 1.2 K/UL (ref 0.5–4.6)
LYMPHOCYTES NFR BLD: 11 % (ref 13–44)
MAGNESIUM SERPL-MCNC: 1.7 MG/DL (ref 1.8–2.4)
MCH RBC QN AUTO: 31.2 PG (ref 26.1–32.9)
MCHC RBC AUTO-ENTMCNC: 34.9 G/DL (ref 31.4–35)
MCV RBC AUTO: 89.5 FL (ref 82–102)
MONOCYTES # BLD: 1 K/UL (ref 0.1–1.3)
MONOCYTES NFR BLD: 9 % (ref 4–12)
NEUTS SEG # BLD: 8.7 K/UL (ref 1.7–8.2)
NEUTS SEG NFR BLD: 80 % (ref 43–78)
NRBC # BLD: 0 K/UL (ref 0–0.2)
PLATELET # BLD AUTO: 266 K/UL (ref 150–450)
PMV BLD AUTO: 10.2 FL (ref 9.4–12.3)
POTASSIUM SERPL-SCNC: 4.3 MMOL/L (ref 3.5–5.1)
POTASSIUM SERPL-SCNC: 5.2 MMOL/L (ref 3.5–5.1)
PROT SERPL-MCNC: 8 G/DL (ref 6.3–8.2)
RBC # BLD AUTO: 5.06 M/UL (ref 4.05–5.2)
SODIUM SERPL-SCNC: 131 MMOL/L (ref 133–143)
WBC # BLD AUTO: 10.9 K/UL (ref 4.3–11.1)

## 2024-12-30 PROCEDURE — 96375 TX/PRO/DX INJ NEW DRUG ADDON: CPT

## 2024-12-30 PROCEDURE — 84132 ASSAY OF SERUM POTASSIUM: CPT

## 2024-12-30 PROCEDURE — 80053 COMPREHEN METABOLIC PANEL: CPT

## 2024-12-30 PROCEDURE — 6370000000 HC RX 637 (ALT 250 FOR IP): Performed by: PHYSICIAN ASSISTANT

## 2024-12-30 PROCEDURE — 2500000003 HC RX 250 WO HCPCS: Performed by: PHYSICIAN ASSISTANT

## 2024-12-30 PROCEDURE — 85025 COMPLETE CBC W/AUTO DIFF WBC: CPT

## 2024-12-30 PROCEDURE — 96361 HYDRATE IV INFUSION ADD-ON: CPT

## 2024-12-30 PROCEDURE — 2580000003 HC RX 258: Performed by: PHYSICIAN ASSISTANT

## 2024-12-30 PROCEDURE — 83735 ASSAY OF MAGNESIUM: CPT

## 2024-12-30 PROCEDURE — 74022 RADEX COMPL AQT ABD SERIES: CPT

## 2024-12-30 PROCEDURE — 99285 EMERGENCY DEPT VISIT HI MDM: CPT

## 2024-12-30 PROCEDURE — 74177 CT ABD & PELVIS W/CONTRAST: CPT

## 2024-12-30 PROCEDURE — 96365 THER/PROPH/DIAG IV INF INIT: CPT

## 2024-12-30 PROCEDURE — 83690 ASSAY OF LIPASE: CPT

## 2024-12-30 PROCEDURE — 96376 TX/PRO/DX INJ SAME DRUG ADON: CPT

## 2024-12-30 PROCEDURE — 96368 THER/DIAG CONCURRENT INF: CPT

## 2024-12-30 PROCEDURE — 6360000002 HC RX W HCPCS: Performed by: PHYSICIAN ASSISTANT

## 2024-12-30 PROCEDURE — 6360000004 HC RX CONTRAST MEDICATION: Performed by: PHYSICIAN ASSISTANT

## 2024-12-30 RX ORDER — BLOOD SUGAR DIAGNOSTIC
1 STRIP MISCELLANEOUS 2 TIMES DAILY
COMMUNITY
Start: 2024-11-18

## 2024-12-30 RX ORDER — LISINOPRIL 10 MG/1
10 TABLET ORAL
COMMUNITY
Start: 2024-05-13

## 2024-12-30 RX ORDER — MORPHINE SULFATE 2 MG/ML
2 INJECTION, SOLUTION INTRAMUSCULAR; INTRAVENOUS ONCE
Status: COMPLETED | OUTPATIENT
Start: 2024-12-30 | End: 2024-12-30

## 2024-12-30 RX ORDER — SEMAGLUTIDE 0.68 MG/ML
1 INJECTION, SOLUTION SUBCUTANEOUS
COMMUNITY
Start: 2024-05-13

## 2024-12-30 RX ORDER — ONDANSETRON 2 MG/ML
4 INJECTION INTRAMUSCULAR; INTRAVENOUS
Status: COMPLETED | OUTPATIENT
Start: 2024-12-30 | End: 2024-12-30

## 2024-12-30 RX ORDER — CETIRIZINE HYDROCHLORIDE 10 MG/1
10 TABLET ORAL DAILY PRN
COMMUNITY
Start: 2024-05-13

## 2024-12-30 RX ORDER — BUDESONIDE AND FORMOTEROL FUMARATE DIHYDRATE 80; 4.5 UG/1; UG/1
2 AEROSOL RESPIRATORY (INHALATION)
COMMUNITY

## 2024-12-30 RX ORDER — METHOCARBAMOL 750 MG/1
750 TABLET, FILM COATED ORAL
COMMUNITY
Start: 2024-05-31

## 2024-12-30 RX ORDER — CLOTRIMAZOLE 1 %
CREAM (GRAM) TOPICAL 2 TIMES DAILY
COMMUNITY
Start: 2024-09-10

## 2024-12-30 RX ORDER — GABAPENTIN 800 MG/1
TABLET ORAL
COMMUNITY
Start: 2024-05-13

## 2024-12-30 RX ORDER — ERGOCALCIFEROL 1.25 MG/1
50000 CAPSULE, LIQUID FILLED ORAL WEEKLY
COMMUNITY
Start: 2024-05-30

## 2024-12-30 RX ORDER — DOCUSATE SODIUM 100 MG/1
100 CAPSULE, LIQUID FILLED ORAL
COMMUNITY
Start: 2024-05-13

## 2024-12-30 RX ORDER — SUCRALFATE 1 G/1
1 TABLET ORAL
Status: COMPLETED | OUTPATIENT
Start: 2024-12-30 | End: 2024-12-30

## 2024-12-30 RX ORDER — INSULIN LISPRO 100 [IU]/ML
INJECTION, SOLUTION INTRAVENOUS; SUBCUTANEOUS
Status: ON HOLD | COMMUNITY
End: 2025-01-03 | Stop reason: HOSPADM

## 2024-12-30 RX ORDER — METFORMIN HYDROCHLORIDE 500 MG/1
1000 TABLET, EXTENDED RELEASE ORAL
COMMUNITY
Start: 2024-05-13

## 2024-12-30 RX ORDER — MAGNESIUM SULFATE 1 G/100ML
1000 INJECTION INTRAVENOUS
Status: COMPLETED | OUTPATIENT
Start: 2024-12-30 | End: 2024-12-30

## 2024-12-30 RX ORDER — FUROSEMIDE 40 MG/1
20 TABLET ORAL
Status: ON HOLD | COMMUNITY
End: 2024-12-31 | Stop reason: ALTCHOICE

## 2024-12-30 RX ORDER — 0.9 % SODIUM CHLORIDE 0.9 %
1000 INTRAVENOUS SOLUTION INTRAVENOUS
Status: COMPLETED | OUTPATIENT
Start: 2024-12-30 | End: 2024-12-30

## 2024-12-30 RX ORDER — IOPAMIDOL 755 MG/ML
100 INJECTION, SOLUTION INTRAVASCULAR
Status: COMPLETED | OUTPATIENT
Start: 2024-12-30 | End: 2024-12-30

## 2024-12-30 RX ORDER — INSULIN ASPART 100 [IU]/ML
INJECTION, SOLUTION INTRAVENOUS; SUBCUTANEOUS
Status: ON HOLD | COMMUNITY
End: 2025-01-03 | Stop reason: HOSPADM

## 2024-12-30 RX ADMIN — FAMOTIDINE 20 MG: 10 INJECTION, SOLUTION INTRAVENOUS at 18:08

## 2024-12-30 RX ADMIN — IOPAMIDOL 100 ML: 755 INJECTION, SOLUTION INTRAVENOUS at 19:21

## 2024-12-30 RX ADMIN — PIPERACILLIN AND TAZOBACTAM 4500 MG: 4; .5 INJECTION, POWDER, FOR SOLUTION INTRAVENOUS at 20:43

## 2024-12-30 RX ADMIN — ONDANSETRON 4 MG: 2 INJECTION INTRAMUSCULAR; INTRAVENOUS at 18:50

## 2024-12-30 RX ADMIN — SUCRALFATE 1 G: 1 TABLET ORAL at 18:07

## 2024-12-30 RX ADMIN — MORPHINE SULFATE 2 MG: 2 INJECTION, SOLUTION INTRAMUSCULAR; INTRAVENOUS at 18:51

## 2024-12-30 RX ADMIN — MAGNESIUM SULFATE HEPTAHYDRATE 1000 MG: 1 INJECTION, SOLUTION INTRAVENOUS at 20:44

## 2024-12-30 RX ADMIN — SODIUM CHLORIDE 1000 ML: 9 INJECTION, SOLUTION INTRAVENOUS at 18:52

## 2024-12-30 RX ADMIN — ONDANSETRON 4 MG: 2 INJECTION INTRAMUSCULAR; INTRAVENOUS at 18:08

## 2024-12-30 ASSESSMENT — ENCOUNTER SYMPTOMS
ABDOMINAL DISTENTION: 0
SORE THROAT: 0
EYE REDNESS: 0
ABDOMINAL PAIN: 1
CHEST TIGHTNESS: 0
NAUSEA: 1
DIARRHEA: 0
BACK PAIN: 0
COUGH: 0
RHINORRHEA: 0
SHORTNESS OF BREATH: 0
VOMITING: 1

## 2024-12-30 ASSESSMENT — PAIN SCALES - GENERAL
PAINLEVEL_OUTOF10: 10
PAINLEVEL_OUTOF10: 9
PAINLEVEL_OUTOF10: 10

## 2024-12-30 ASSESSMENT — PAIN - FUNCTIONAL ASSESSMENT: PAIN_FUNCTIONAL_ASSESSMENT: 0-10

## 2024-12-30 ASSESSMENT — PAIN DESCRIPTION - LOCATION: LOCATION: ABDOMEN

## 2024-12-30 NOTE — ED PROVIDER NOTES
Emergency Department Provider Note       PCP: No, Pcp   Age: 59 y.o.   Sex: adult     DISPOSITION Decision To Transfer 12/30/2024 08:35:10 PM   DISPOSITION CONDITION Stable            ICD-10-CM    1. Acute biliary pancreatitis, unspecified complication status  K85.10       2. Cholecystitis, unspecified  K81.9       3. Pneumonia of right lower lobe due to infectious organism  J18.9           Medical Decision Making     Patient is a 59-year-old female history of cirrhosis diabetes presenting with hematemesis and epigastric pain.  She states since 12/18/2024 she has had persistent nausea and intermittent vomiting as well as worsening pain in the epigastric area.  She has not been able to keep down solids or liquids.  This morning she tried to take a few sips of ginger ale and shortly after she had bouts of emesis.  She reports that she vomited up what appeared to be stomach acid however there were small clots of blood mixed in with her cyst.  She is not on blood thinners.  She also reports having black-colored stools does not know how long that has been going on.  She has pain in the epigastric area.  No fevers or chills.  She is afebrile nontoxic appearance, vital signs within appropriate limits.  She does have protuberant abdomen and has tenderness with palpation of the epigastric area however no significant guarding.  Digital rectal exam performed with a negative Hemoccult.  Will check labs and obtain acute abdominal series  Labs Reviewed   CBC WITH AUTO DIFFERENTIAL - Abnormal; Notable for the following components:       Result Value    Hemoglobin 15.8 (*)     Neutrophils % 80 (*)     Lymphocytes % 11 (*)     Eosinophils % 0 (*)     Neutrophils Absolute 8.7 (*)     All other components within normal limits   COMPREHENSIVE METABOLIC PANEL - Abnormal; Notable for the following components:    Sodium 131 (*)     Potassium 5.2 (*)     Chloride 96 (*)     Glucose 234 (*)     BUN 5 (*)     Creatinine <0.20 (*)      Total Bilirubin 5.8 (*)      (*)     Alk Phosphatase 347 (*)     Albumin 3.0 (*)     Globulin 5.0 (*)     Albumin/Globulin Ratio 0.6 (*)     All other components within normal limits   LIPASE - Abnormal; Notable for the following components:    Lipase >3,000 (*)     All other components within normal limits   MAGNESIUM - Abnormal; Notable for the following components:    Magnesium 1.7 (*)     All other components within normal limits   POTASSIUM     Labs show that she has a lipase greater than 3000 with an elevated bili of 5.8 and mild elevation in her LFTs.  She does have previous history cholelithiasis.  Did obtain CT imaging which showed inflammatory changes around the gallbladder concerning for cholecystitis.  She was given dose of IV Zosyn, IV fluids and pain medication and will plan to be transferred downtown to hospitalist for admission to be seen by both GI and general surgery.  Patient agreeable to plan.     1 or more acute illnesses that pose a threat to life or bodily function.   Prescription drug management performed.  Parental controlled substances given in the ED.  Shared medical decision making was utilized in creating the patients health plan today.    I independently ordered and reviewed each unique test.       I interpreted the X-rays CXR show RLL infiltrate.  I interpreted the CT Scan CT abd/pelvis shows inflamed gallbladder.    The patient was admitted and I have discussed patient management with the admitting provider.          History     Patient is a 59-year-old female with history of liver cirrhosis and diabetes who presents with complaint of hematemesis.  She states that she had a biopsy of her thoracic spine and the T10-T11 area on 1217 and has not been feeling well since.  She states since then she has had persistent nausea and has not had much of an appetite.  For this reason she has not been taking her insulin.  She states that she has been drinking fluids and trying to eat some

## 2024-12-30 NOTE — ED TRIAGE NOTES
Patient ambulatory in ED with steady gait with complaint of vomiting up blood. Patient had a lung biopsy on the 17th of this month but the emesis started today. Some diarrhea present.

## 2024-12-31 ENCOUNTER — ANESTHESIA EVENT (OUTPATIENT)
Dept: ENDOSCOPY | Age: 59
End: 2024-12-31
Payer: MEDICAID

## 2024-12-31 ENCOUNTER — ANESTHESIA (OUTPATIENT)
Dept: ENDOSCOPY | Age: 59
End: 2024-12-31
Payer: MEDICAID

## 2024-12-31 ENCOUNTER — HOSPITAL ENCOUNTER (INPATIENT)
Age: 59
LOS: 3 days | Discharge: HOME | DRG: 282 | End: 2025-01-03
Attending: INTERNAL MEDICINE | Admitting: FAMILY MEDICINE
Payer: MEDICAID

## 2024-12-31 ENCOUNTER — APPOINTMENT (OUTPATIENT)
Dept: GENERAL RADIOLOGY | Age: 59
DRG: 282 | End: 2024-12-31
Attending: INTERNAL MEDICINE
Payer: MEDICAID

## 2024-12-31 VITALS
HEART RATE: 93 BPM | RESPIRATION RATE: 18 BRPM | BODY MASS INDEX: 32.58 KG/M2 | OXYGEN SATURATION: 95 % | WEIGHT: 220 LBS | TEMPERATURE: 98.8 F | HEIGHT: 69 IN | SYSTOLIC BLOOD PRESSURE: 126 MMHG | DIASTOLIC BLOOD PRESSURE: 79 MMHG

## 2024-12-31 DIAGNOSIS — M54.6 THORACIC BACK PAIN, UNSPECIFIED BACK PAIN LATERALITY, UNSPECIFIED CHRONICITY: Primary | ICD-10-CM

## 2024-12-31 DIAGNOSIS — R79.89 ABNORMAL LFTS: ICD-10-CM

## 2024-12-31 PROBLEM — K85.90 PANCREATITIS: Status: RESOLVED | Noted: 2024-12-31 | Resolved: 2024-12-31

## 2024-12-31 PROBLEM — K81.9 CHOLECYSTITIS: Status: ACTIVE | Noted: 2024-12-31

## 2024-12-31 PROBLEM — K85.90 ACUTE PANCREATITIS, UNSPECIFIED COMPLICATION STATUS, UNSPECIFIED PANCREATITIS TYPE: Status: ACTIVE | Noted: 2024-12-31

## 2024-12-31 PROBLEM — K74.60 CIRRHOSIS (HCC): Status: ACTIVE | Noted: 2024-12-31

## 2024-12-31 PROBLEM — K85.90 PANCREATITIS: Status: ACTIVE | Noted: 2024-12-31

## 2024-12-31 PROBLEM — I10 HYPERTENSION: Status: ACTIVE | Noted: 2024-12-31

## 2024-12-31 PROBLEM — K92.0 HEMATEMESIS: Status: ACTIVE | Noted: 2024-12-30

## 2024-12-31 PROBLEM — K85.90 PANCREATITIS: Status: ACTIVE | Noted: 2024-12-30

## 2024-12-31 LAB
ALBUMIN SERPL-MCNC: 2 G/DL (ref 3.5–5)
ALBUMIN/GLOB SERPL: 0.5 (ref 1–1.9)
ALP SERPL-CCNC: 238 U/L (ref 35–104)
ALT SERPL-CCNC: 30 U/L (ref 8–45)
ANION GAP SERPL CALC-SCNC: 7 MMOL/L (ref 7–16)
AST SERPL-CCNC: 43 U/L (ref 15–37)
BASOPHILS # BLD: 0 K/UL (ref 0–0.2)
BASOPHILS NFR BLD: 0 % (ref 0–2)
BILIRUB SERPL-MCNC: 2.6 MG/DL (ref 0–1.2)
BUN SERPL-MCNC: 6 MG/DL (ref 6–23)
CALCIUM SERPL-MCNC: 8.3 MG/DL (ref 8.8–10.2)
CHLORIDE SERPL-SCNC: 101 MMOL/L (ref 98–107)
CO2 SERPL-SCNC: 27 MMOL/L (ref 20–29)
CREAT SERPL-MCNC: 0.68 MG/DL (ref 0.6–1.1)
DIFFERENTIAL METHOD BLD: ABNORMAL
EOSINOPHIL # BLD: 0.1 K/UL (ref 0–0.8)
EOSINOPHIL NFR BLD: 2 % (ref 0.5–7.8)
ERYTHROCYTE [DISTWIDTH] IN BLOOD BY AUTOMATED COUNT: 13.6 % (ref 11.9–14.6)
GLOBULIN SER CALC-MCNC: 3.9 G/DL (ref 2.3–3.5)
GLUCOSE BLD STRIP.AUTO-MCNC: 132 MG/DL (ref 65–100)
GLUCOSE BLD STRIP.AUTO-MCNC: 144 MG/DL (ref 65–100)
GLUCOSE BLD STRIP.AUTO-MCNC: 250 MG/DL (ref 65–100)
GLUCOSE SERPL-MCNC: 156 MG/DL (ref 70–99)
HAV IGM SER QL: NONREACTIVE
HBV CORE IGM SER QL: NONREACTIVE
HBV SURFACE AG SER QL: NONREACTIVE
HCT VFR BLD AUTO: 36.2 % (ref 35.8–46.3)
HCV AB SER QL: REACTIVE
HGB BLD-MCNC: 12.2 G/DL (ref 11.7–15.4)
IMM GRANULOCYTES # BLD AUTO: 0 K/UL (ref 0–0.5)
IMM GRANULOCYTES NFR BLD AUTO: 0 % (ref 0–5)
INR PPP: 1.1
LIPASE SERPL-CCNC: 463 U/L (ref 13–60)
LYMPHOCYTES # BLD: 1.3 K/UL (ref 0.5–4.6)
LYMPHOCYTES NFR BLD: 19 % (ref 13–44)
MAGNESIUM SERPL-MCNC: 1.8 MG/DL (ref 1.8–2.4)
MCH RBC QN AUTO: 30.8 PG (ref 26.1–32.9)
MCHC RBC AUTO-ENTMCNC: 33.7 G/DL (ref 31.4–35)
MCV RBC AUTO: 91.4 FL (ref 82–102)
MONOCYTES # BLD: 0.6 K/UL (ref 0.1–1.3)
MONOCYTES NFR BLD: 9 % (ref 4–12)
NEUTS SEG # BLD: 4.9 K/UL (ref 1.7–8.2)
NEUTS SEG NFR BLD: 70 % (ref 43–78)
NRBC # BLD: 0 K/UL (ref 0–0.2)
PLATELET # BLD AUTO: 213 K/UL (ref 150–450)
PMV BLD AUTO: 9.6 FL (ref 9.4–12.3)
POTASSIUM SERPL-SCNC: 3.3 MMOL/L (ref 3.5–5.1)
PROT SERPL-MCNC: 5.9 G/DL (ref 6.3–8.2)
PROTHROMBIN TIME: 14.5 SEC (ref 11.3–14.9)
RBC # BLD AUTO: 3.96 M/UL (ref 4.05–5.2)
SERVICE CMNT-IMP: ABNORMAL
SODIUM SERPL-SCNC: 134 MMOL/L (ref 136–145)
WBC # BLD AUTO: 6.9 K/UL (ref 4.3–11.1)

## 2024-12-31 PROCEDURE — 2580000003 HC RX 258: Performed by: REGISTERED NURSE

## 2024-12-31 PROCEDURE — 43273 ENDOSCOPIC PANCREATOSCOPY: CPT | Performed by: INTERNAL MEDICINE

## 2024-12-31 PROCEDURE — 7100000000 HC PACU RECOVERY - FIRST 15 MIN: Performed by: INTERNAL MEDICINE

## 2024-12-31 PROCEDURE — 3700000001 HC ADD 15 MINUTES (ANESTHESIA): Performed by: INTERNAL MEDICINE

## 2024-12-31 PROCEDURE — 99223 1ST HOSP IP/OBS HIGH 75: CPT | Performed by: PHYSICIAN ASSISTANT

## 2024-12-31 PROCEDURE — 80053 COMPREHEN METABOLIC PANEL: CPT

## 2024-12-31 PROCEDURE — 6370000000 HC RX 637 (ALT 250 FOR IP): Performed by: PHYSICIAN ASSISTANT

## 2024-12-31 PROCEDURE — 1100000000 HC RM PRIVATE

## 2024-12-31 PROCEDURE — 6360000004 HC RX CONTRAST MEDICATION: Performed by: INTERNAL MEDICINE

## 2024-12-31 PROCEDURE — 6360000002 HC RX W HCPCS: Performed by: FAMILY MEDICINE

## 2024-12-31 PROCEDURE — 80074 ACUTE HEPATITIS PANEL: CPT

## 2024-12-31 PROCEDURE — 94760 N-INVAS EAR/PLS OXIMETRY 1: CPT

## 2024-12-31 PROCEDURE — 7100000001 HC PACU RECOVERY - ADDTL 15 MIN: Performed by: INTERNAL MEDICINE

## 2024-12-31 PROCEDURE — 43274 ERCP DUCT STENT PLACEMENT: CPT | Performed by: INTERNAL MEDICINE

## 2024-12-31 PROCEDURE — 0FC98ZZ EXTIRPATION OF MATTER FROM COMMON BILE DUCT, VIA NATURAL OR ARTIFICIAL OPENING ENDOSCOPIC: ICD-10-PCS | Performed by: INTERNAL MEDICINE

## 2024-12-31 PROCEDURE — 87040 BLOOD CULTURE FOR BACTERIA: CPT

## 2024-12-31 PROCEDURE — 83690 ASSAY OF LIPASE: CPT

## 2024-12-31 PROCEDURE — 2580000003 HC RX 258: Performed by: FAMILY MEDICINE

## 2024-12-31 PROCEDURE — 82962 GLUCOSE BLOOD TEST: CPT

## 2024-12-31 PROCEDURE — 2500000003 HC RX 250 WO HCPCS: Performed by: REGISTERED NURSE

## 2024-12-31 PROCEDURE — 43264 ERCP REMOVE DUCT CALCULI: CPT | Performed by: INTERNAL MEDICINE

## 2024-12-31 PROCEDURE — 94640 AIRWAY INHALATION TREATMENT: CPT

## 2024-12-31 PROCEDURE — 85025 COMPLETE CBC W/AUTO DIFF WBC: CPT

## 2024-12-31 PROCEDURE — 2709999900 HC NON-CHARGEABLE SUPPLY: Performed by: INTERNAL MEDICINE

## 2024-12-31 PROCEDURE — 94761 N-INVAS EAR/PLS OXIMETRY MLT: CPT

## 2024-12-31 PROCEDURE — C1748 ENDOSCOPE, SINGLE, UGI: HCPCS | Performed by: INTERNAL MEDICINE

## 2024-12-31 PROCEDURE — 3609018800 HC ERCP DX COLLECTION SPECIMEN BRUSHING/WASHING: Performed by: INTERNAL MEDICINE

## 2024-12-31 PROCEDURE — 6370000000 HC RX 637 (ALT 250 FOR IP): Performed by: FAMILY MEDICINE

## 2024-12-31 PROCEDURE — 83735 ASSAY OF MAGNESIUM: CPT

## 2024-12-31 PROCEDURE — C1769 GUIDE WIRE: HCPCS | Performed by: INTERNAL MEDICINE

## 2024-12-31 PROCEDURE — 36415 COLL VENOUS BLD VENIPUNCTURE: CPT

## 2024-12-31 PROCEDURE — 0F798DZ DILATION OF COMMON BILE DUCT WITH INTRALUMINAL DEVICE, VIA NATURAL OR ARTIFICIAL OPENING ENDOSCOPIC: ICD-10-PCS | Performed by: INTERNAL MEDICINE

## 2024-12-31 PROCEDURE — 74328 X-RAY BILE DUCT ENDOSCOPY: CPT | Performed by: INTERNAL MEDICINE

## 2024-12-31 PROCEDURE — 6360000002 HC RX W HCPCS: Performed by: REGISTERED NURSE

## 2024-12-31 PROCEDURE — 3700000000 HC ANESTHESIA ATTENDED CARE: Performed by: INTERNAL MEDICINE

## 2024-12-31 PROCEDURE — C2625 STENT, NON-COR, TEM W/DEL SY: HCPCS | Performed by: INTERNAL MEDICINE

## 2024-12-31 PROCEDURE — BF131ZZ FLUOROSCOPY OF GALLBLADDER AND BILE DUCTS USING LOW OSMOLAR CONTRAST: ICD-10-PCS | Performed by: INTERNAL MEDICINE

## 2024-12-31 PROCEDURE — 2720000010 HC SURG SUPPLY STERILE: Performed by: INTERNAL MEDICINE

## 2024-12-31 PROCEDURE — 85610 PROTHROMBIN TIME: CPT

## 2024-12-31 DEVICE — BILIARY STENT WITH NAVIFLEXTM RX DELIVERY SYSTEM
Type: IMPLANTABLE DEVICE | Site: BILE DUCT | Status: FUNCTIONAL
Brand: ADVANIX™ BILIARY

## 2024-12-31 RX ORDER — ATORVASTATIN CALCIUM 10 MG/1
10 TABLET, FILM COATED ORAL NIGHTLY
Status: DISCONTINUED | OUTPATIENT
Start: 2024-12-31 | End: 2025-01-03 | Stop reason: HOSPADM

## 2024-12-31 RX ORDER — SODIUM CHLORIDE, SODIUM LACTATE, POTASSIUM CHLORIDE, CALCIUM CHLORIDE 600; 310; 30; 20 MG/100ML; MG/100ML; MG/100ML; MG/100ML
INJECTION, SOLUTION INTRAVENOUS CONTINUOUS
Status: ACTIVE | OUTPATIENT
Start: 2024-12-31 | End: 2025-01-01

## 2024-12-31 RX ORDER — CYCLOBENZAPRINE HCL 10 MG
10 TABLET ORAL DAILY
COMMUNITY

## 2024-12-31 RX ORDER — ONDANSETRON 2 MG/ML
4 INJECTION INTRAMUSCULAR; INTRAVENOUS EVERY 6 HOURS PRN
Status: DISCONTINUED | OUTPATIENT
Start: 2024-12-31 | End: 2025-01-03 | Stop reason: HOSPADM

## 2024-12-31 RX ORDER — SUCCINYLCHOLINE CHLORIDE 20 MG/ML
INJECTION INTRAMUSCULAR; INTRAVENOUS
Status: DISCONTINUED | OUTPATIENT
Start: 2024-12-31 | End: 2024-12-31 | Stop reason: SDUPTHER

## 2024-12-31 RX ORDER — NYSTATIN 100000 U/G
CREAM TOPICAL DAILY PRN
COMMUNITY

## 2024-12-31 RX ORDER — LINEZOLID 2 MG/ML
600 INJECTION, SOLUTION INTRAVENOUS EVERY 12 HOURS
Status: DISCONTINUED | OUTPATIENT
Start: 2024-12-31 | End: 2024-12-31

## 2024-12-31 RX ORDER — ONDANSETRON 2 MG/ML
INJECTION INTRAMUSCULAR; INTRAVENOUS
Status: DISCONTINUED | OUTPATIENT
Start: 2024-12-31 | End: 2024-12-31 | Stop reason: SDUPTHER

## 2024-12-31 RX ORDER — MONTELUKAST SODIUM 10 MG/1
10 TABLET ORAL NIGHTLY
COMMUNITY

## 2024-12-31 RX ORDER — IBUPROFEN 600 MG/1
1 TABLET ORAL PRN
Status: DISCONTINUED | OUTPATIENT
Start: 2024-12-31 | End: 2025-01-03 | Stop reason: HOSPADM

## 2024-12-31 RX ORDER — FUROSEMIDE 20 MG/1
20 TABLET ORAL DAILY
Status: DISCONTINUED | OUTPATIENT
Start: 2024-12-31 | End: 2025-01-03 | Stop reason: HOSPADM

## 2024-12-31 RX ORDER — SIMVASTATIN 10 MG
10 TABLET ORAL NIGHTLY
COMMUNITY

## 2024-12-31 RX ORDER — OXYCODONE HYDROCHLORIDE 30 MG/1
30 TABLET ORAL EVERY 4 HOURS PRN
COMMUNITY

## 2024-12-31 RX ORDER — INSULIN GLARGINE 100 [IU]/ML
35 INJECTION, SOLUTION SUBCUTANEOUS 2 TIMES DAILY
Status: DISCONTINUED | OUTPATIENT
Start: 2024-12-31 | End: 2025-01-02

## 2024-12-31 RX ORDER — LISINOPRIL 5 MG/1
10 TABLET ORAL DAILY
Status: DISCONTINUED | OUTPATIENT
Start: 2024-12-31 | End: 2025-01-03 | Stop reason: HOSPADM

## 2024-12-31 RX ORDER — RABEPRAZOLE SODIUM 20 MG/1
20 TABLET, DELAYED RELEASE ORAL 2 TIMES DAILY
COMMUNITY

## 2024-12-31 RX ORDER — ROCURONIUM BROMIDE 10 MG/ML
INJECTION, SOLUTION INTRAVENOUS
Status: DISCONTINUED | OUTPATIENT
Start: 2024-12-31 | End: 2024-12-31 | Stop reason: SDUPTHER

## 2024-12-31 RX ORDER — SPIRONOLACTONE 25 MG/1
25 TABLET ORAL DAILY
COMMUNITY

## 2024-12-31 RX ORDER — INSULIN LISPRO 100 [IU]/ML
0-8 INJECTION, SOLUTION INTRAVENOUS; SUBCUTANEOUS
Status: DISCONTINUED | OUTPATIENT
Start: 2024-12-31 | End: 2025-01-01

## 2024-12-31 RX ORDER — LIDOCAINE HYDROCHLORIDE 20 MG/ML
INJECTION, SOLUTION EPIDURAL; INFILTRATION; INTRACAUDAL; PERINEURAL
Status: DISCONTINUED | OUTPATIENT
Start: 2024-12-31 | End: 2024-12-31 | Stop reason: SDUPTHER

## 2024-12-31 RX ORDER — DULOXETIN HYDROCHLORIDE 60 MG/1
60 CAPSULE, DELAYED RELEASE ORAL DAILY
Status: DISCONTINUED | OUTPATIENT
Start: 2024-12-31 | End: 2025-01-03 | Stop reason: HOSPADM

## 2024-12-31 RX ORDER — POTASSIUM CHLORIDE 7.45 MG/ML
10 INJECTION INTRAVENOUS PRN
Status: DISCONTINUED | OUTPATIENT
Start: 2024-12-31 | End: 2025-01-03 | Stop reason: HOSPADM

## 2024-12-31 RX ORDER — POLYETHYLENE GLYCOL 3350 17 G/17G
17 POWDER, FOR SOLUTION ORAL DAILY PRN
Status: DISCONTINUED | OUTPATIENT
Start: 2024-12-31 | End: 2025-01-03 | Stop reason: HOSPADM

## 2024-12-31 RX ORDER — POLYVINYL ALCOHOL 14 MG/ML
1 SOLUTION/ DROPS OPHTHALMIC PRN
COMMUNITY

## 2024-12-31 RX ORDER — ONDANSETRON 4 MG/1
4 TABLET, ORALLY DISINTEGRATING ORAL EVERY 8 HOURS PRN
Status: DISCONTINUED | OUTPATIENT
Start: 2024-12-31 | End: 2025-01-03 | Stop reason: HOSPADM

## 2024-12-31 RX ORDER — FUROSEMIDE 20 MG/1
20 TABLET ORAL DAILY
COMMUNITY

## 2024-12-31 RX ORDER — MONTELUKAST SODIUM 10 MG/1
10 TABLET ORAL NIGHTLY
Status: DISCONTINUED | OUTPATIENT
Start: 2024-12-31 | End: 2025-01-03 | Stop reason: HOSPADM

## 2024-12-31 RX ORDER — SODIUM CHLORIDE 0.9 % (FLUSH) 0.9 %
5-40 SYRINGE (ML) INJECTION EVERY 12 HOURS SCHEDULED
Status: DISCONTINUED | OUTPATIENT
Start: 2024-12-31 | End: 2025-01-03 | Stop reason: HOSPADM

## 2024-12-31 RX ORDER — TRAZODONE HYDROCHLORIDE 50 MG/1
50 TABLET, FILM COATED ORAL NIGHTLY PRN
Status: DISCONTINUED | OUTPATIENT
Start: 2024-12-31 | End: 2025-01-03 | Stop reason: HOSPADM

## 2024-12-31 RX ORDER — METHOCARBAMOL 500 MG/1
750 TABLET, FILM COATED ORAL 3 TIMES DAILY PRN
Status: DISCONTINUED | OUTPATIENT
Start: 2024-12-31 | End: 2025-01-03 | Stop reason: HOSPADM

## 2024-12-31 RX ORDER — INSULIN LISPRO 100 [IU]/ML
0-8 INJECTION, SOLUTION INTRAVENOUS; SUBCUTANEOUS
Status: DISCONTINUED | OUTPATIENT
Start: 2024-12-31 | End: 2024-12-31 | Stop reason: SDUPTHER

## 2024-12-31 RX ORDER — LOPERAMIDE HYDROCHLORIDE 2 MG/1
2 CAPSULE ORAL 4 TIMES DAILY PRN
COMMUNITY

## 2024-12-31 RX ORDER — FLUTICASONE PROPIONATE 50 MCG
1 SPRAY, SUSPENSION (ML) NASAL DAILY
COMMUNITY

## 2024-12-31 RX ORDER — MELOXICAM 15 MG/1
15 TABLET ORAL DAILY PRN
Status: ON HOLD | COMMUNITY
End: 2025-01-03

## 2024-12-31 RX ORDER — DEXTROSE MONOHYDRATE 100 MG/ML
INJECTION, SOLUTION INTRAVENOUS CONTINUOUS PRN
Status: DISCONTINUED | OUTPATIENT
Start: 2024-12-31 | End: 2025-01-03 | Stop reason: HOSPADM

## 2024-12-31 RX ORDER — SODIUM CHLORIDE 9 MG/ML
INJECTION, SOLUTION INTRAVENOUS PRN
Status: DISCONTINUED | OUTPATIENT
Start: 2024-12-31 | End: 2025-01-03 | Stop reason: HOSPADM

## 2024-12-31 RX ORDER — ACETAMINOPHEN 325 MG/1
650 TABLET ORAL EVERY 8 HOURS PRN
COMMUNITY

## 2024-12-31 RX ORDER — SPIRONOLACTONE 25 MG/1
25 TABLET ORAL DAILY
Status: DISCONTINUED | OUTPATIENT
Start: 2024-12-31 | End: 2025-01-03 | Stop reason: HOSPADM

## 2024-12-31 RX ORDER — SODIUM CHLORIDE, SODIUM LACTATE, POTASSIUM CHLORIDE, CALCIUM CHLORIDE 600; 310; 30; 20 MG/100ML; MG/100ML; MG/100ML; MG/100ML
INJECTION, SOLUTION INTRAVENOUS
Status: DISCONTINUED | OUTPATIENT
Start: 2024-12-31 | End: 2024-12-31 | Stop reason: SDUPTHER

## 2024-12-31 RX ORDER — POTASSIUM CHLORIDE 1500 MG/1
40 TABLET, EXTENDED RELEASE ORAL PRN
Status: DISCONTINUED | OUTPATIENT
Start: 2024-12-31 | End: 2025-01-03 | Stop reason: HOSPADM

## 2024-12-31 RX ORDER — OXYCODONE HYDROCHLORIDE 5 MG/1
5 TABLET ORAL EVERY 4 HOURS PRN
Status: DISCONTINUED | OUTPATIENT
Start: 2024-12-31 | End: 2025-01-03 | Stop reason: HOSPADM

## 2024-12-31 RX ORDER — POTASSIUM CHLORIDE 750 MG/1
10 TABLET, EXTENDED RELEASE ORAL DAILY
COMMUNITY

## 2024-12-31 RX ORDER — INSULIN GLARGINE 100 [IU]/ML
35 INJECTION, SOLUTION SUBCUTANEOUS 2 TIMES DAILY
COMMUNITY

## 2024-12-31 RX ORDER — DEXAMETHASONE SODIUM PHOSPHATE 4 MG/ML
INJECTION, SOLUTION INTRA-ARTICULAR; INTRALESIONAL; INTRAMUSCULAR; INTRAVENOUS; SOFT TISSUE
Status: DISCONTINUED | OUTPATIENT
Start: 2024-12-31 | End: 2024-12-31 | Stop reason: SDUPTHER

## 2024-12-31 RX ORDER — DULOXETIN HYDROCHLORIDE 60 MG/1
60 CAPSULE, DELAYED RELEASE ORAL DAILY
COMMUNITY

## 2024-12-31 RX ORDER — GABAPENTIN 400 MG/1
800 CAPSULE ORAL 3 TIMES DAILY
Status: DISCONTINUED | OUTPATIENT
Start: 2024-12-31 | End: 2025-01-03 | Stop reason: HOSPADM

## 2024-12-31 RX ORDER — PANTOPRAZOLE SODIUM 40 MG/1
40 TABLET, DELAYED RELEASE ORAL
Status: DISCONTINUED | OUTPATIENT
Start: 2024-12-31 | End: 2024-12-31

## 2024-12-31 RX ORDER — TRAZODONE HYDROCHLORIDE 50 MG/1
50 TABLET, FILM COATED ORAL NIGHTLY PRN
COMMUNITY

## 2024-12-31 RX ORDER — KETOROLAC TROMETHAMINE 5 MG/ML
1 SOLUTION OPHTHALMIC 4 TIMES DAILY
COMMUNITY

## 2024-12-31 RX ORDER — SILDENAFIL 100 MG/1
50 TABLET, FILM COATED ORAL PRN
COMMUNITY

## 2024-12-31 RX ORDER — PANTOPRAZOLE SODIUM 40 MG/1
40 TABLET, DELAYED RELEASE ORAL
Status: DISCONTINUED | OUTPATIENT
Start: 2024-12-31 | End: 2025-01-03 | Stop reason: HOSPADM

## 2024-12-31 RX ORDER — IOPAMIDOL 612 MG/ML
INJECTION, SOLUTION INTRAVASCULAR PRN
Status: DISCONTINUED | OUTPATIENT
Start: 2024-12-31 | End: 2024-12-31 | Stop reason: ALTCHOICE

## 2024-12-31 RX ORDER — MAGNESIUM SULFATE IN WATER 40 MG/ML
2000 INJECTION, SOLUTION INTRAVENOUS PRN
Status: DISCONTINUED | OUTPATIENT
Start: 2024-12-31 | End: 2025-01-03 | Stop reason: HOSPADM

## 2024-12-31 RX ORDER — TAMSULOSIN HYDROCHLORIDE 0.4 MG/1
0.4 CAPSULE ORAL DAILY
COMMUNITY

## 2024-12-31 RX ORDER — PROPOFOL 10 MG/ML
INJECTION, EMULSION INTRAVENOUS
Status: DISCONTINUED | OUTPATIENT
Start: 2024-12-31 | End: 2024-12-31 | Stop reason: SDUPTHER

## 2024-12-31 RX ORDER — CETIRIZINE HYDROCHLORIDE 5 MG/1
10 TABLET ORAL DAILY PRN
Status: DISCONTINUED | OUTPATIENT
Start: 2024-12-31 | End: 2025-01-03 | Stop reason: HOSPADM

## 2024-12-31 RX ORDER — MORPHINE SULFATE 2 MG/ML
2 INJECTION, SOLUTION INTRAMUSCULAR; INTRAVENOUS EVERY 4 HOURS PRN
Status: DISCONTINUED | OUTPATIENT
Start: 2024-12-31 | End: 2025-01-03 | Stop reason: HOSPADM

## 2024-12-31 RX ORDER — SODIUM CHLORIDE 0.9 % (FLUSH) 0.9 %
5-40 SYRINGE (ML) INJECTION PRN
Status: DISCONTINUED | OUTPATIENT
Start: 2024-12-31 | End: 2025-01-03 | Stop reason: HOSPADM

## 2024-12-31 RX ADMIN — PIPERACILLIN AND TAZOBACTAM 3375 MG: 3; .375 INJECTION, POWDER, LYOPHILIZED, FOR SOLUTION INTRAVENOUS at 08:42

## 2024-12-31 RX ADMIN — LIDOCAINE HYDROCHLORIDE 80 MG: 20 INJECTION, SOLUTION EPIDURAL; INFILTRATION; INTRACAUDAL; PERINEURAL at 15:32

## 2024-12-31 RX ADMIN — LISINOPRIL 10 MG: 5 TABLET ORAL at 08:38

## 2024-12-31 RX ADMIN — ARFORMOTEROL TARTRATE: 15 SOLUTION RESPIRATORY (INHALATION) at 20:53

## 2024-12-31 RX ADMIN — PHENYLEPHRINE HYDROCHLORIDE 100 MCG: 10 INJECTION INTRAVENOUS at 15:45

## 2024-12-31 RX ADMIN — ARFORMOTEROL TARTRATE: 15 SOLUTION RESPIRATORY (INHALATION) at 07:02

## 2024-12-31 RX ADMIN — POTASSIUM CHLORIDE 40 MEQ: 1500 TABLET, EXTENDED RELEASE ORAL at 08:38

## 2024-12-31 RX ADMIN — OXYCODONE 5 MG: 5 TABLET ORAL at 12:37

## 2024-12-31 RX ADMIN — MORPHINE SULFATE 2 MG: 2 INJECTION, SOLUTION INTRAMUSCULAR; INTRAVENOUS at 08:33

## 2024-12-31 RX ADMIN — FUROSEMIDE 20 MG: 20 TABLET ORAL at 08:38

## 2024-12-31 RX ADMIN — SODIUM CHLORIDE, SODIUM LACTATE, POTASSIUM CHLORIDE, AND CALCIUM CHLORIDE: 600; 310; 30; 20 INJECTION, SOLUTION INTRAVENOUS at 15:27

## 2024-12-31 RX ADMIN — GABAPENTIN 800 MG: 400 CAPSULE ORAL at 14:09

## 2024-12-31 RX ADMIN — MORPHINE SULFATE 2 MG: 2 INJECTION, SOLUTION INTRAMUSCULAR; INTRAVENOUS at 21:31

## 2024-12-31 RX ADMIN — MORPHINE SULFATE 2 MG: 2 INJECTION, SOLUTION INTRAMUSCULAR; INTRAVENOUS at 02:48

## 2024-12-31 RX ADMIN — ONDANSETRON 4 MG: 2 INJECTION INTRAMUSCULAR; INTRAVENOUS at 02:48

## 2024-12-31 RX ADMIN — INSULIN LISPRO 4 UNITS: 100 INJECTION, SOLUTION INTRAVENOUS; SUBCUTANEOUS at 21:26

## 2024-12-31 RX ADMIN — MONTELUKAST 10 MG: 10 TABLET, FILM COATED ORAL at 21:29

## 2024-12-31 RX ADMIN — SODIUM CHLORIDE, POTASSIUM CHLORIDE, SODIUM LACTATE AND CALCIUM CHLORIDE: 600; 310; 30; 20 INJECTION, SOLUTION INTRAVENOUS at 01:52

## 2024-12-31 RX ADMIN — GABAPENTIN 800 MG: 400 CAPSULE ORAL at 08:38

## 2024-12-31 RX ADMIN — ONDANSETRON 4 MG: 2 INJECTION INTRAMUSCULAR; INTRAVENOUS at 15:44

## 2024-12-31 RX ADMIN — Medication 160 MG: at 15:34

## 2024-12-31 RX ADMIN — DICLOFENAC SODIUM 4 G: 10 GEL TOPICAL at 09:50

## 2024-12-31 RX ADMIN — PANTOPRAZOLE SODIUM 40 MG: 40 TABLET, DELAYED RELEASE ORAL at 17:21

## 2024-12-31 RX ADMIN — ROCURONIUM BROMIDE 10 MG: 10 INJECTION, SOLUTION INTRAVENOUS at 15:33

## 2024-12-31 RX ADMIN — PROPOFOL 200 MG: 10 INJECTION, EMULSION INTRAVENOUS at 15:32

## 2024-12-31 RX ADMIN — PHENYLEPHRINE HYDROCHLORIDE 100 MCG: 10 INJECTION INTRAVENOUS at 15:59

## 2024-12-31 RX ADMIN — ONDANSETRON 4 MG: 4 TABLET, ORALLY DISINTEGRATING ORAL at 21:28

## 2024-12-31 RX ADMIN — PHENYLEPHRINE HYDROCHLORIDE 100 MCG: 10 INJECTION INTRAVENOUS at 15:32

## 2024-12-31 RX ADMIN — PHENYLEPHRINE HYDROCHLORIDE 100 MCG: 10 INJECTION INTRAVENOUS at 15:50

## 2024-12-31 RX ADMIN — DEXAMETHASONE SODIUM PHOSPHATE 4 MG: 4 INJECTION INTRA-ARTICULAR; INTRALESIONAL; INTRAMUSCULAR; INTRAVENOUS; SOFT TISSUE at 15:44

## 2024-12-31 RX ADMIN — DULOXETINE HYDROCHLORIDE 60 MG: 60 CAPSULE, DELAYED RELEASE ORAL at 09:58

## 2024-12-31 RX ADMIN — PANTOPRAZOLE SODIUM 40 MG: 40 TABLET, DELAYED RELEASE ORAL at 08:38

## 2024-12-31 RX ADMIN — GABAPENTIN 800 MG: 400 CAPSULE ORAL at 21:28

## 2024-12-31 RX ADMIN — PIPERACILLIN AND TAZOBACTAM 3375 MG: 3; .375 INJECTION, POWDER, LYOPHILIZED, FOR SOLUTION INTRAVENOUS at 17:23

## 2024-12-31 RX ADMIN — ONDANSETRON 4 MG: 2 INJECTION INTRAMUSCULAR; INTRAVENOUS at 08:35

## 2024-12-31 ASSESSMENT — PAIN SCALES - GENERAL
PAINLEVEL_OUTOF10: 9
PAINLEVEL_OUTOF10: 6
PAINLEVEL_OUTOF10: 0
PAINLEVEL_OUTOF10: 0
PAINLEVEL_OUTOF10: 6
PAINLEVEL_OUTOF10: 6
PAINLEVEL_OUTOF10: 9
PAINLEVEL_OUTOF10: 9

## 2024-12-31 ASSESSMENT — PAIN DESCRIPTION - DESCRIPTORS
DESCRIPTORS: ACHING

## 2024-12-31 ASSESSMENT — COPD QUESTIONNAIRES: CAT_SEVERITY: MILD

## 2024-12-31 ASSESSMENT — PAIN DESCRIPTION - ORIENTATION
ORIENTATION: RIGHT;LOWER
ORIENTATION: MID
ORIENTATION: RIGHT

## 2024-12-31 ASSESSMENT — PAIN DESCRIPTION - LOCATION
LOCATION: ABDOMEN
LOCATION: ABDOMEN;BACK;KNEE

## 2024-12-31 ASSESSMENT — PAIN - FUNCTIONAL ASSESSMENT
PAIN_FUNCTIONAL_ASSESSMENT: ACTIVITIES ARE NOT PREVENTED
PAIN_FUNCTIONAL_ASSESSMENT: ACTIVITIES ARE NOT PREVENTED
PAIN_FUNCTIONAL_ASSESSMENT: 0-10

## 2024-12-31 NOTE — OP NOTE
Procedure: ERCP with sphincterotomy, stone removal and stent placement with Spyglass    Indication: CBD stones.    Date of Procedure: 12/31/2024    Patient profile: Refer to patient note in chart for documentation of history and physical.    Providers: Kenneth Guzman MD    Referring MD: Dr. Anaya    PCP: Mehreen, Pcp    Medicines: General Anesthesia    Complication: No immediate complications.     Estimated blood loss: Minimal    Procedure: After the risks including, but not limited to medication reaction, infection, bleeding, perforation, missed lesions, benefits and alternatives of the procedure were discussed with the patient and all questions were answered, informed consent was obtained. The duodenoscope was passed under direct vision throughout the procedure, the patient's blood pressure pulse and oxygen saturation were monitored continuously. The scope was introduced through the mouth and advanced to the second part of duodenum. The endoscopy was performed without difficulty. The patient tolerated the procedure well. The ERCP was performed with the patient in the  Prone position.    Findings:     films obtained prior to start the procedure was normal.  It was decided to utilize the EXALT duodenoscope to minimize the risk of scope associated infection.    The duodenoscope was introduced from the mouth advanced into the esophagus into the stomach, and into the level of the ampulla. The ampulla appeared normal. Next, a Rx 44 sphincterotome with 0.035 inch semi-stiff guidewire was introduced, and cannulation of the bile duct was attempted using the wire first cannulation technique. This was successful. The wire was advanced deep into the intrahepatic ducts, followed by the sphincterotome over the wire. A cholangiogram was obtained, and the bile duct was visualized under fluoroscopy.    A dilated bile duct was identified.  The bile duct measured 15 mm.  A filling defect consistent with a stone was identified.   A  bloodless sphincterotomy was then performed.  This was successful. The sphincterotome was then exchanged out over the wire, and a 9-12 mm stone extraction balloon was introduced. Starting at the level of the hilum, multiple sweeps of the bile duct were performed at 9 mm and 12 mm.  A total of 1 stones , sludge and pus like material was successfully removed. An occlusion cholangiogram was obtained. This was normal with no evidence of any biliary leaks, strictures, or any additional filling defects noted. The balloon was then exchanged out over the wire.    No filling of cystic duct noted.    Next, it was decided to perform a spyglass examination, and hence, the Praedicat spyglass/cholangioscope was introduced and advanced deep into the right and left hepatic ducts. The spyglass was normal. No additional stones noted. Cystic duct orifice was not noted.    Finally, to minimize the risk of postcholecystectomy leak a biliary stent was successfully introduced over the wire and a 10 Fr x7 cm straight plastic biliary stent was successfully placed in the standard fashion.    The scope was pulled back, and the procedure was subsequently ended.    Fluoroscopy: All fluoroscopic images were reviewed by myself and decisions were made based on my interpretation of the images. A total of 9 images were aquired and 1  minutes and 10 seconds of fluoroscopy time was utilized.    Impression:  --CBD stones / sludge and pus. Sphincterotomy performed successfully, followed by stone removal and stent placement. Spyglass was also performed    Recommendations:  --Repeat ERCP in 6-8weeks for stent removal/exchange.   --Rectal indomethacin to prevent post-ERCP pancreatitis.  --monitor clinical status. May need to consider EUS drainage of gallbladder if no improvement in symptoms in 36-48 hours, now that patient has had bile duct drained.  --Clear liquid diet for 24 hours. If pain free, can advance diet as tolerated.      Kenneth Guzman,

## 2024-12-31 NOTE — PROGRESS NOTES
TRANSFER - IN REPORT:    Verbal report received from Patti PETERSON on Miguel A Zambrano  being received from ED for routine progression of patient care      Report consisted of patient's Situation, Background, Assessment and   Recommendations(SBAR).     Information from the following report(s) MAR and Recent Results was reviewed with the receiving nurse.    Opportunity for questions and clarification was provided.      Assessment will be completed upon patient's arrival to unit and care assumed.

## 2024-12-31 NOTE — CONSULTS
Consult Note            Date:12/31/2024        Patient Name:Miguel A Zambrano     YOB: 1965     Age:59 y.o.    Inpatient consult to GI  Consult performed by: Grinnell, Melissa R, PA-C  Consult ordered by: Rosa Ambrosio MD  Reason for consult: pancreatitis, elevated total bilirubin          Chief Complaint   Abdominal pain, nausea, vomiting, hematemesis, dark stools    History Obtained From   patient, electronic medical record    History of Present Illness   Miguel A Moffett is a 59-year-old transgender female with PMH significant for HTN, CHF, IDDM, TIA, PE (on Xarelto), COPD, SARA, GERD, cirrhosis, hx hepatitis B and C, obesity with BMI 32.49, thoracic discitis/osteomyelitis, chronic back pain, chronic narcotic use, PTSD.     She is admitted to the hospitalist service after presenting to the ED 12/30/2024 for a nearly 2-week history of nausea, intermittent vomiting, epigastric pain.  She also reported hematemesis and dark stools.  Workup in the ED showed normal WBC, Hgb 15.8, , , total bilirubin 5.8, AST//47, alkaline phosphatase 347, lipase > 3000.  CT abdomen/pelvis showed inflammatory changes around the gallbladder concerning for cholecystitis. RUQ ultrasound is pending. FOBT was negative in the ED. Repeat LFTs today show down-trending total bilirubin (2.6), AST/ALT 43/30, alkaline phosphatase 238, lipase 463. GI and general surgery both consulted for evaluation.     Prior lab review shows total bilirubin historically elevated 2.2 and 179 respectively on 9/5/2024 and 1.7 and alkaline phosphatase respectively 11/19/2024 (OSH). Patient reports known hx of cirrhosis and has been followed regularly by GI group in [place] every 6 months for HCC screening. Had RUQ ultrasound 12/18/2024 for screening purposes which showed cirrhotic liver without mass, normal gallbladder without gallstones or biliary ductal dilatation. Has documented hx of hepatitis B and C in 2014 per chart

## 2024-12-31 NOTE — ED NOTES
TRANSFER - OUT REPORT:    Verbal report given to Francisca PETERSON on Miguel A Zambrano  being transferred to [facility] room 621 for routine progression of patient care       Report consisted of patient's Situation, Background, Assessment and   Recommendations(SBAR).     Information from the following report(s) Nurse Handoff Report, ED Encounter Summary, ED SBAR, Adult Overview, MAR, and Recent Results was reviewed with the receiving nurse.    [place] Fall Assessment:    Presents to emergency department  because of falls (Syncope, seizure, or loss of consciousness): No  Age > 70: No  Altered Mental Status, Intoxication with alcohol or substance confusion (Disorientation, impaired judgment, poor safety awaremess, or inability to follow instructions): No  Impaired Mobility: Ambulates or transfers with assistive devices or assistance; Unable to ambulate or transer.: No  Nursing Judgement: No          Lines:   Peripheral IV 12/30/24 Left Wrist (Active)   Site Assessment Clean, dry & intact 12/30/24 6741        Opportunity for questions and clarification was provided.      Patient transported with:  Place[employer]

## 2024-12-31 NOTE — PERIOP NOTE
TRANSFER - IN REPORT:    Verbal report received from NICHOLAS Saeed on Miguel A Zambrano  being received from 621 for ordered procedure      Report consisted of patient's Situation, Background, Assessment and   Recommendations(SBAR).     Information from the following report(s) Adult Overview, MAR, Recent Results, and Med Rec Status was reviewed with the receiving nurse.    Opportunity for questions and clarification was provided.      Assessment will be completed upon patient's arrival to unit and care assumed.     
TRANSFER - OUT REPORT:    Verbal report given to NICHOLAS Saeed on Miguel A Zambrano  being transferred to [facility] for routine post-op       Report consisted of patient’s Situation, Background, Assessment and   Recommendations(SBAR).     Information from the following report(s) Nurse Handoff Report, Surgery Report, Cardiac Rhythm SR, and Neuro Assessment was reviewed with the receiving nurse.    Lines:   Peripheral IV 12/30/24 Left Wrist (Active)   Site Assessment Clean, dry & intact 12/31/24 1639   Line Status Normal saline locked 12/31/24 1639   Line Care Connections checked and tightened 12/31/24 1639   Phlebitis Assessment No symptoms 12/31/24 1639   Infiltration Assessment 0 12/31/24 1639   Alcohol Cap Used Yes 12/31/24 1149   Dressing Status Clean, dry & intact 12/31/24 1639   Dressing Type Transparent 12/31/24 1639        Opportunity for questions and clarification was provided.      Patient transported with:   Tech    VTE prophylaxis orders have been written for Miguel A Zambrano.    Patient and family given floor number and nurses name.  Family updated re: pt status after security code verified.   
22-Mar-2023 21:30

## 2024-12-31 NOTE — ASSESSMENT & PLAN NOTE
- Per chart review.  Appears that physician in [place] ordered an echo a few months ago, but I do not see these results in EMR  - Continue home meds for now

## 2024-12-31 NOTE — CARE COORDINATION
12/31/24 0948   Service Assessment   Patient Orientation Alert and Oriented   Cognition Alert   History Provided By Patient   Primary Caregiver Self   Support Systems Spouse/Significant Other   Patient's Healthcare Decision Maker is: Legal Next of Kin   PCP Verified by CM Yes   Last Visit to PCP Within last 6 months   Prior Functional Level Assistance with the following:   Current Functional Level Assistance with the following:   Can patient return to prior living arrangement Yes   Ability to make needs known: Good   Family able to assist with home care needs: No   Would you like for me to discuss the discharge plan with any other family members/significant others, and if so, who? No   Financial Resources Medicare;Medicaid   Community Resources None   Social/Functional History   Lives With Significant other   Type of Home Apartment   Condition of Participation: Discharge Planning   The Plan for Transition of Care is related to the following treatment goals: return home   The Patient and/or Patient Representative was provided with a Choice of Provider? Patient   The Patient and/Or Patient Representative agree with the Discharge Plan? Yes   Freedom of Choice list was provided with basic dialogue that supports the patient's individualized plan of care/goals, treatment preferences, and shares the quality data associated with the providers?  Yes     Assessment completed with patient at bedside. Patient lives with her boyfriend. Patient has a rollator she uses for ambulation. Patient reports she is somewhat IND with adls. Patient reports she is new to the [facility]. She has [place] Medicaid and use to receive in home services but no longer does since moving. Patient drives, and still see her PCP in [place].  Demographics confirmed, discharge de paz is to return home.    Dayanna WELCH, ACM  [place]

## 2024-12-31 NOTE — ASSESSMENT & PLAN NOTE
- Concern for cholecystitis on CT  - Will check RUQ US  - Consult to GI and General Surgery  - IV zosyn

## 2024-12-31 NOTE — PROGRESS NOTES
Pt had some pain and nausea this shift, see MAR for tx. Pt educated on new diet and verbalized understanding. Pt is resting in bed without any complaints. Hourly rounds completed and all needs met. Bed is low, locked,call light is in reach and pt is encouraged to call for assistance.

## 2024-12-31 NOTE — ED PROVIDER NOTES
ED ATTENDING SHARED SERVICES NOTE:     I have seen and evaluated the patient and performed an independent history and physical exam, and I agree with the assessment and plan as documented in the advanced provider note.  I performed the history of present illness, physical exam, and medical decision making in its entirety.  With the following updates:     Pt comes to the ED for evaluation of N/V, decreased appetite and po intake as well as 'burning' abdominal pain back pain.  Pt reports hematemesis with blood tinged emesis/sputum.  Pt denies F/C.  She denies ETOH abuse.  Pt with hx of HLD, on a statin.  Pt with stable VS. CBC without leukocytosis.  Stable H&H.  CMP with T. bili of 5.8, ALT 47, , alk phos 347.  Lipase elevated at >3000.      CT A/P:  IMPRESSION:  1. Inflammatory changes seen about the gallbladder concerning for cholecystitis.  If there is further clinical indication, recommend right upper quadrant  ultrasound for further evaluation. 2. Cirrhosis.  3. Diverticulosis with no evidence of diverticulitis    Patient given IVF, morphine and Zofran for symptoms.  ?Infiltrate appreciated, will start on Zosyn for possible aspiration pna.  Pt without cough, SOB or hypoxia. Will have patient transferred for admission to [facility] where GI, surgery and hospitalist is available for admission.    1 acute illness with systemic symptoms.    Over the counter drug management performed.  Prescription drug management performed.  Parental controlled substances given in the ED.  Discussion with external consultants.  Shared medical decision making was utilized in creating the patients health plan today.    I reviewed external records: previous ED records reviewed.      I interpreted the CT Scan CT A/P shows gallbladder distention, no SBO, no pneumoperitoneum..    The patient was admitted and I have discussed patient management with the admitting provider.          Genet Fitzpatrick Y, DO  12/30/24 2026       [place]

## 2024-12-31 NOTE — CONSULTS
16 -- -- --   12/31/24 0833 -- -- -- -- 16 -- -- --   12/31/24 0705 124/68 98.2 °F (36.8 °C) Oral 76 22 90 % -- --   12/31/24 0334 (!) 107/57 99.3 °F (37.4 °C) Oral 84 17 94 % -- --   12/31/24 0235 -- -- -- -- -- -- 1.753 m (5' 9\") 99.8 kg (220 lb)   12/31/24 0130 124/83 98.1 °F (36.7 °C) Oral 92 18 -- -- --       Labs:  Recent Labs     12/31/24  0457   WBC 6.9   HGB 12.2      *   K 3.3*      CO2 27   BUN 6   ALT 30       Lab Results   Component Value Date/Time    WBC 6.9 12/31/2024 04:57 AM    HGB 12.2 12/31/2024 04:57 AM     12/31/2024 04:57 AM     12/31/2024 04:57 AM    K 3.3 12/31/2024 04:57 AM     12/31/2024 04:57 AM    CO2 27 12/31/2024 04:57 AM    BUN 6 12/31/2024 04:57 AM    ALT 30 12/31/2024 04:57 AM       I reviewed recent labs and recent radiologic studies.    I independently reviewed radiology images for studies I described above or studies I have ordered.       Xray Result (most recent):  XR ACUTE ABD SERIES CHEST 1 VW 12/30/2024    Narrative  Abdominal Series    INDICATION:  epigastric pain, vomiting, hematemesis    COMPARISON:  None    TECHNIQUE:  A single view of the chest and 2 views of the abdomen were obtained.    FINDINGS: Right basilar opacity. The heart size is normal.    Flat and upright views of the abdomen show a normal bowel gas pattern. There is  no evidence of obstruction. There is no free air.  No renal calculi are seen.    Impression  1. Right basilar opacity which may represent atelectasis, airspace disease  and/or pleural effusion  2. No acute findings in the abdomen.      Electronically signed by Saeed Massey    CT Result (most recent):  CT ABDOMEN PELVIS W IV CONTRAST 12/30/2024    Narrative  CT OF THE ABDOMEN AND PELVIS    INDICATION: Epigastric pain, hematemesis, pancreatitis, elevated bilirubin,  history of cirrhosis    TECHNIQUE: Multiple 2D axial images were obtained through the abdomen and  pelvis.  Oral contrast was used for bowel  opacification.  100mL of Isovue 370  intravenous contrast was used for better evaluation of solid organs and vascular  structures.  Radiation dose reduction techniques were used for this study.  All  CT scans performed at this facility use one or all of the following: Automated  exposure control, adjustment of the mA and/or kVp according to patient's size,  iterative reconstruction.    COMPARISON: 5/1/2024    FINDINGS:  - LUNG BASES: Coronary artery calcifications.    - LIVER: Nodularity in the liver.  - GALLBLADDER/BILE DUCTS: Fat stranding and heterogeneity in the gallbladder.  - PANCREAS: Normal.  - SPLEEN: Normal.    - ADRENALS: Normal.  - KIDNEYS/URETERS: No hydronephrosis or significant mass. Bilateral renal cysts.  - BLADDER: Normal.  - REPRODUCTIVE ORGANS: No pelvic masses.    - BOWEL: Normal caliber.  No inflammatory changes. Diverticulosis of the sigmoid  colon with no evidence of diverticulitis  - LYMPH NODES: No significant retroperitoneal, mesenteric, or pelvic adenopathy.  - BONES: No fracture or significant bone lesion.  - VASCULATURE: Normal  - OTHER: No ascites.    Impression  1. Inflammatory changes seen about the gallbladder concerning for cholecystitis.  If there is further clinical indication, recommend right upper quadrant  ultrasound for further evaluation. 2. Cirrhosis.  3. Diverticulosis with no evidence of diverticulitis          Electronically signed by Saeed Massey      Admission date (for inpatients): 12/31/2024   * No surgery date entered *  Procedure(s):  ENDOSCOPIC ULTRASOUND  ESOPHAGOGASTRODUODENOSCOPY    ASSESSMENT/PLAN:    Principal Problem:    Acute pancreatitis, unspecified complication status, unspecified pancreatitis type  Active Problems:    Cholecystitis    Elevated LFTs    Hypertension    Type 2 diabetes mellitus (HCC)    Heart failure, unspecified (HCC)    Chronic obstructive pulmonary disease (HCC)    Pulmonary embolism (HCC)    Abnormal LFTs  Resolved Problems:

## 2024-12-31 NOTE — H&P
Hospitalist History and Physical   Admit Date:  2024  1:24 AM   Name:  Miguel A Zambrano   Age:  59 y.o.  Sex:  adult  :  1965   MRN:  [id number]     Presenting Complaint: abdominal pain  Reason(s) for Admission: Acute pancreatitis, unspecified complication status, unspecified pancreatitis type [K85.90]     History of Present Illness:   Miguel A Zambrano is a 59 y.o. adult with medical history of HTN, DM2, CHF, PE on Xarelto who presented to the [place] ED with abdominal pain.  Pain mainly in epigastrium.  Associated nausea/vomiting.  Reports noticing some possible blood clots in emesis.  Also reports melena.  Upon ER evaluation, Na is 131, Mg is 1.7.  BG is 234.  LFTs elevated with , , t.bili 5.8.  Lipase is >3,000.  WBC is 10.9.  Hgb is 15.8.  CT shows:  IMPRESSION:  1. Inflammatory changes seen about the gallbladder concerning for cholecystitis. If there is further clinical indication, recommend right upper quadrant ultrasound for further evaluation.   2. Cirrhosis.  3. Diverticulosis with no evidence of diverticulitis  Hospitalist asked to admit.    Review of Systems:  10 systems reviewed and negative except as noted in HPI.  Assessment & Plan:   * Acute pancreatitis, unspecified complication status, unspecified pancreatitis type  Assessment & Plan  - Possible gallstone pancreatitis  - NPO  - IVFs  - PRN pain medications  - Consult with GI    Cholecystitis  Assessment & Plan  - Concern for cholecystitis on CT  - Will check RUQ US  - Consult to GI and General Surgery  - IV zosyn    Pulmonary embolism (HCC)  Assessment & Plan  - Continue home Xarelto    Chronic obstructive pulmonary disease (HCC)  Assessment & Plan  - Continue home meds  - Not in exacerbation    Heart failure, unspecified (HCC)  Assessment & Plan  - Per chart review.  Appears that physician in [place] ordered an echo a few months ago, but I do not see these results in EMR  - Continue home meds for  now    Type 2 diabetes mellitus (HCC)  Assessment & Plan  - Holding home meds for now  - NPO for now given pancreatitis/cholecystitis  - SSI coverage    Hypertension  Assessment & Plan  - Continue home meds    Elevated LFTs  Assessment & Plan  - LFTs elevated.  Yusef specifically is 5.8  - Consult to GI  - Ordered RUQ US  - Monitor daily CMP      Disposition: inpatient      Past medical history reviewed.    Past surgical history reviewed.     Allergies   Allergen Reactions    Bee Venom Anaphylaxis      Social History     Tobacco Use    Smoking status: Never    Smokeless tobacco: Never   Substance Use Topics    Alcohol use: Not Currently      No family history on file.   Family history reviewed and noncontributory to patient's acute condition; no relevant family history unless otherwise noted above.  Immunization History   Administered Date(s) Administered    COVID-19, MODERNA BLUE border, Primary or Immunocompromised, (age 12y+), IM, 100 mcg/0.5mL 04/12/2021, 05/13/2021, 03/09/2022    TD 5LF, TENIVAC, (age 7y+), IM, 0.5mL 08/27/2024     PTA Medications:  Current Outpatient Medications   Medication Instructions    acetaminophen (TYLENOL) 650 mg, Oral, EVERY 8 HOURS PRN    blood glucose test strips (FREESTYLE TEST STRIPS) strip 1 each, 2 TIMES DAILY    budesonide-formoterol (SYMBICORT) 80-4.5 MCG/ACT AERO 2 puffs    cetirizine (ZYRTEC) 10 mg, Oral, DAILY PRN    clotrimazole (LOTRIMIN) 1 % cream Topical, 2 TIMES DAILY    cyclobenzaprine (FLEXERIL) 10 mg, Oral, DAILY    diclofenac sodium (VOLTAREN) 1 % GEL 1 Application, Topical, DAILY PRN    docusate sodium (COLACE) 100 mg    DULoxetine (CYMBALTA) 60 mg, Oral, DAILY    empagliflozin (JARDIANCE) 25 mg, Oral, DAILY    fluticasone (FLONASE) 50 MCG/ACT nasal spray 1 spray, Each Nostril, DAILY    furosemide (LASIX) 20 mg, Oral, DAILY    gabapentin (NEURONTIN) 800 MG tablet 1 tablet Orally three times daily    Insulin Aspart FlexPen 100 UNIT/ML SOPN SubCUTAneous    insulin

## 2024-12-31 NOTE — ANESTHESIA PRE PROCEDURE
(LANTUS) injection vial 35 Units  35 Units SubCUTAneous BID Rosa Ambrosio MD       • montelukast (SINGULAIR) tablet 10 mg  10 mg Oral Nightly Rosa Ambrosio MD       • spironolactone (ALDACTONE) tablet 25 mg  25 mg Oral Daily Rosa Ambrosio MD       • traZODone (DESYREL) tablet 50 mg  50 mg Oral Nightly PRN Rosa Ambrosio MD       • [Held by provider] atorvastatin (LIPITOR) tablet 10 mg  10 mg Oral Nightly Rosa Ambrosio MD       • insulin lispro (HUMALOG,ADMELOG) injection vial 0-8 Units  0-8 Units SubCUTAneous Q6H WA RT Rosa Ambrosio MD       • pantoprazole (PROTONIX) tablet 40 mg  40 mg Oral BID AC Grinnell, Melissa R, PA-C           Allergies:    Allergies   Allergen Reactions   • Bee Venom Anaphylaxis       Problem List:    Patient Active Problem List   Diagnosis Code   • Cholecystitis K81.9   • Elevated LFTs R79.89   • Hypertension I10   • Acute pancreatitis, unspecified complication status, unspecified pancreatitis type K85.90   • Type 2 diabetes mellitus (HCC) E11.9   • Heart failure, unspecified (HCC) I50.9   • Chronic obstructive pulmonary disease (HCC) J44.9   • Pulmonary embolism (HCC) I26.99   • Hematemesis K92.0   • Pancreatitis K85.90   • Abnormal LFTs R79.89   • Cirrhosis (HCC) K74.60       Past Medical History:  No past medical history on file.    Past Surgical History:  No past surgical history on file.    Social History:    Social History     Tobacco Use   • Smoking status: Never   • Smokeless tobacco: Never   Substance Use Topics   • Alcohol use: Not Currently                                Counseling given: Not Answered      Vital Signs (Current):   Vitals:    12/31/24 0705 12/31/24 0833 12/31/24 0903 12/31/24 1237   BP: 124/68      Pulse: 76      Resp: 22 16 16 18   Temp: 98.2 °F (36.8 °C)      TempSrc: Oral      SpO2: 90%      Weight:       Height:                                                  BP Readings from Last 3 Encounters:   12/31/24 124/68   12/30/24 126/79

## 2024-12-31 NOTE — PROGRESS NOTES
Patient arrived to 621 via EMS. Oriented to room and plan of care. Patient NPO. No distress noted.

## 2024-12-31 NOTE — ED NOTES
Told patient I wanted to keep her blood pressure cuff on and patient refused. Patient stated that I could check it again in 30 minutes.

## 2024-12-31 NOTE — ANESTHESIA POSTPROCEDURE EVALUATION
Department of Anesthesiology  Postprocedure Note    Patient: Miguel A Zambrano  MRN: [id number]  YOB: 1965  Date of evaluation: 12/31/2024    Procedure Summary       Date: 12/31/24 Room / Location: [facility] ENDO FLOURO 1 / [facility] ENDOSCOPY    Anesthesia Start: 1527 Anesthesia Stop: 1618    Procedure: ENDOSCOPIC RETROGRADE CHOLANGIOPANCREATOGRAPHY/EXALT/SPHINCTEROTOMY/BALLOON SWEEP/SPYGLASS/BILIARY STENT PLACEMENT (Upper GI Region) Diagnosis:       Hematemesis      Pancreatitis      Abnormal LFTs      (Hematemesis [K92.0])      (Pancreatitis [K85.90])      (Abnormal LFTs [R79.89])    Surgeons: Kenneth Guzman MD Responsible Provider: Christoph Rodriguez MD    Anesthesia Type: General ASA Status: 3 - Emergent            Anesthesia Type: General    Quincy Phase I: Quincy Score: 10    Quincy Phase II:      Anesthesia Post Evaluation    Patient location during evaluation: PACU  Patient participation: complete - patient participated  Level of consciousness: awake and alert  Airway patency: patent  Nausea & Vomiting: no nausea and no vomiting  Cardiovascular status: hemodynamically stable  Respiratory status: acceptable, nonlabored ventilation and spontaneous ventilation  Hydration status: euvolemic  Comments: BP (!) 111/55   Pulse 92   Temp 98.9 °F (37.2 °C) (Temporal)   Resp 16   Ht 1.753 m (5' 9\")   Wt 99.8 kg (220 lb)   SpO2 96%   BMI 32.49 kg/m²     Multimodal analgesia pain management approach  Pain management: adequate and satisfactory to patient        No notable events documented.

## 2024-12-31 NOTE — PROGRESS NOTES
Hospitalist Progress Note   Admit Date:  2024  1:24 AM   Name:  Miguel A Zambrano   Age:  59 y.o.  Sex:  adult  :  1965   MRN:  [id number]   Room:  /    Presenting/Chief Complaint: No chief complaint on file.     Reason(s) for Admission: Acute pancreatitis, unspecified complication status, unspecified pancreatitis type [K85.90]     Hospital Course:   Please refer to the admission H&P for details of presentation.      In summary, Miguel A Zambrano is a 59 y.o. transgender female with medical history significant for HTN, DM2, CHF, PE on Xarelto , liver cirrhosis,  hx of hep B and C who presented to the [place] ED with epigastric abdominal pain associated with n/v. Also reporting melena as well as bloody emesis.   Workup in the ED with Na of 131, Mg of 1.7, elevated LFTs( , , t.bili 5.8), lipase > 3000, wbc 10.9 and Hb of 15.8.  CT abdomen/pelvis showed inflammatory changes around the gallbladder concerning for cholecystitis.      Subjective/24 hr Events (24) :  Patient is seen and examined at bedside.    Patient laying in bed.  Reports having epigastric pain that goes from her epigastrium to her right upper quadrant.  Nausea vomiting has resolved.  Reports that she follows with GI in [place] every 6 months for her HCC screening.  Recently completed ultrasound of abdomen which showed normal gallbladder without any gallstones or dilated CBD.  Assessment & Plan:     Pancreatitis  Cholecystitis  Elevated lipase of > 3000 on admit with LFT elevations  - GI appreciated.  Plan for EUS with Dr. Guzman today.  - Continue IV Zosyn for cholecystitis. General Surgery has been consulted.  - C/w with IVF  - NPO for now  - RUQ Abd US pending.      Elevated LFTs and hyperbilirubinemia  Liver Cirrhosis  Hx of Hep C and B  , , t.bili 5.8  - RUQ abd pending  - GI appreciated  - hepatitis panel       Hematemesis and Melena  GERD  - increased PPI to BID  - GI following.  g/dL    Hematocrit 36.2 35.8 - 46.3 %    MCV 91.4 82 - 102 FL    MCH 30.8 26.1 - 32.9 PG    MCHC 33.7 31.4 - 35.0 g/dL    RDW 13.6 11.9 - 14.6 %    Platelets 213 150 - 450 K/uL    MPV 9.6 9.4 - 12.3 FL    nRBC 0.00 0.0 - 0.2 K/uL    Differential Type AUTOMATED      Neutrophils % 70 43 - 78 %    Lymphocytes % 19 13 - 44 %    Monocytes % 9 4.0 - 12.0 %    Eosinophils % 2 0.5 - 7.8 %    Basophils % 0 0.0 - 2.0 %    Immature Granulocytes % 0 0.0 - 5.0 %    Neutrophils Absolute 4.9 1.7 - 8.2 K/UL    Lymphocytes Absolute 1.3 0.5 - 4.6 K/UL    Monocytes Absolute 0.6 0.1 - 1.3 K/UL    Eosinophils Absolute 0.1 0.0 - 0.8 K/UL    Basophils Absolute 0.0 0.0 - 0.2 K/UL    Immature Granulocytes Absolute 0.0 0.0 - 0.5 K/UL   Magnesium    Collection Time: 12/31/24  4:57 AM   Result Value Ref Range    Magnesium 1.8 1.8 - 2.4 mg/dL   Lipase    Collection Time: 12/31/24  4:57 AM   Result Value Ref Range    Lipase 463 (H) 13 - 60 U/L   Hepatitis Panel, Acute    Collection Time: 12/31/24  4:57 AM   Result Value Ref Range    Hep A IgM NONREACTIVE NR      Hep B Core Ab, IgM NONREACTIVE NR      Hepatitis B Surface Ag NONREACTIVE NR      Hepatitis C Ab REACTIVE (A) NR     POCT Glucose    Collection Time: 12/31/24  7:24 AM   Result Value Ref Range    POC Glucose 132 (H) 65 - 100 mg/dL    Performed by: [employer]    POCT Glucose    Collection Time: 12/31/24 11:17 AM   Result Value Ref Range    POC Glucose 144 (H) 65 - 100 mg/dL    Performed by: [employer]    Protime-INR    Collection Time: 12/31/24 11:26 AM   Result Value Ref Range    Protime 14.5 11.3 - 14.9 sec    INR 1.1         No results for input(s): \"COVID19\" in the last 72 hours.    Current Meds:  Current Facility-Administered Medications   Medication Dose Route Frequency    arformoterol 15 mcg-budesonide 0.5 mg neb solution   Nebulization BID RT    cetirizine (ZYRTEC) tablet 10 mg  10 mg Oral Daily PRN    furosemide (LASIX) tablet 20 mg  20 mg Oral Daily

## 2025-01-01 LAB
ALBUMIN SERPL-MCNC: 1.8 G/DL (ref 3.5–5)
ALBUMIN/GLOB SERPL: 0.5 (ref 1–1.9)
ALP SERPL-CCNC: 190 U/L (ref 35–104)
ALT SERPL-CCNC: 21 U/L (ref 8–45)
ANION GAP SERPL CALC-SCNC: 7 MMOL/L (ref 7–16)
AST SERPL-CCNC: 20 U/L (ref 15–37)
BASOPHILS # BLD: 0 K/UL (ref 0–0.2)
BASOPHILS NFR BLD: 0 % (ref 0–2)
BILIRUB SERPL-MCNC: 1.3 MG/DL (ref 0–1.2)
BUN SERPL-MCNC: 8 MG/DL (ref 6–23)
CALCIUM SERPL-MCNC: 8.2 MG/DL (ref 8.8–10.2)
CHLORIDE SERPL-SCNC: 100 MMOL/L (ref 98–107)
CO2 SERPL-SCNC: 23 MMOL/L (ref 20–29)
CREAT SERPL-MCNC: 0.68 MG/DL (ref 0.6–1.1)
DIFFERENTIAL METHOD BLD: ABNORMAL
EOSINOPHIL # BLD: 0 K/UL (ref 0–0.8)
EOSINOPHIL NFR BLD: 0 % (ref 0.5–7.8)
ERYTHROCYTE [DISTWIDTH] IN BLOOD BY AUTOMATED COUNT: 13.2 % (ref 11.9–14.6)
GLOBULIN SER CALC-MCNC: 3.8 G/DL (ref 2.3–3.5)
GLUCOSE BLD STRIP.AUTO-MCNC: 202 MG/DL (ref 65–100)
GLUCOSE BLD STRIP.AUTO-MCNC: 295 MG/DL (ref 65–100)
GLUCOSE BLD STRIP.AUTO-MCNC: 301 MG/DL (ref 65–100)
GLUCOSE BLD STRIP.AUTO-MCNC: 383 MG/DL (ref 65–100)
GLUCOSE SERPL-MCNC: 357 MG/DL (ref 70–99)
HCT VFR BLD AUTO: 32.3 % (ref 35.8–46.3)
HEMOCCULT STL QL: NEGATIVE
HGB BLD-MCNC: 11 G/DL (ref 11.7–15.4)
IMM GRANULOCYTES # BLD AUTO: 0 K/UL (ref 0–0.5)
IMM GRANULOCYTES NFR BLD AUTO: 0 % (ref 0–5)
LYMPHOCYTES # BLD: 0.5 K/UL (ref 0.5–4.6)
LYMPHOCYTES NFR BLD: 17 % (ref 13–44)
MCH RBC QN AUTO: 31.3 PG (ref 26.1–32.9)
MCHC RBC AUTO-ENTMCNC: 34.1 G/DL (ref 31.4–35)
MCV RBC AUTO: 91.8 FL (ref 82–102)
MONOCYTES # BLD: 0.3 K/UL (ref 0.1–1.3)
MONOCYTES NFR BLD: 8 % (ref 4–12)
NEUTS SEG # BLD: 2.5 K/UL (ref 1.7–8.2)
NEUTS SEG NFR BLD: 75 % (ref 43–78)
NRBC # BLD: 0 K/UL (ref 0–0.2)
PLATELET # BLD AUTO: 170 K/UL (ref 150–450)
PMV BLD AUTO: 10 FL (ref 9.4–12.3)
POTASSIUM SERPL-SCNC: 4.5 MMOL/L (ref 3.5–5.1)
PROT SERPL-MCNC: 5.6 G/DL (ref 6.3–8.2)
RBC # BLD AUTO: 3.52 M/UL (ref 4.05–5.2)
SERVICE CMNT-IMP: ABNORMAL
SODIUM SERPL-SCNC: 131 MMOL/L (ref 136–145)
WBC # BLD AUTO: 3.3 K/UL (ref 4.3–11.1)

## 2025-01-01 PROCEDURE — 6360000002 HC RX W HCPCS: Performed by: FAMILY MEDICINE

## 2025-01-01 PROCEDURE — 2500000003 HC RX 250 WO HCPCS: Performed by: FAMILY MEDICINE

## 2025-01-01 PROCEDURE — 85025 COMPLETE CBC W/AUTO DIFF WBC: CPT

## 2025-01-01 PROCEDURE — 36415 COLL VENOUS BLD VENIPUNCTURE: CPT

## 2025-01-01 PROCEDURE — 82272 OCCULT BLD FECES 1-3 TESTS: CPT

## 2025-01-01 PROCEDURE — 6370000000 HC RX 637 (ALT 250 FOR IP): Performed by: PHYSICIAN ASSISTANT

## 2025-01-01 PROCEDURE — 2580000003 HC RX 258: Performed by: FAMILY MEDICINE

## 2025-01-01 PROCEDURE — 94640 AIRWAY INHALATION TREATMENT: CPT

## 2025-01-01 PROCEDURE — 6370000000 HC RX 637 (ALT 250 FOR IP): Performed by: FAMILY MEDICINE

## 2025-01-01 PROCEDURE — 1100000000 HC RM PRIVATE

## 2025-01-01 PROCEDURE — 6370000000 HC RX 637 (ALT 250 FOR IP): Performed by: INTERNAL MEDICINE

## 2025-01-01 PROCEDURE — 82962 GLUCOSE BLOOD TEST: CPT

## 2025-01-01 PROCEDURE — 80053 COMPREHEN METABOLIC PANEL: CPT

## 2025-01-01 PROCEDURE — 94761 N-INVAS EAR/PLS OXIMETRY MLT: CPT

## 2025-01-01 PROCEDURE — 94760 N-INVAS EAR/PLS OXIMETRY 1: CPT

## 2025-01-01 PROCEDURE — 87493 C DIFF AMPLIFIED PROBE: CPT

## 2025-01-01 RX ORDER — INSULIN LISPRO 100 [IU]/ML
0-8 INJECTION, SOLUTION INTRAVENOUS; SUBCUTANEOUS
Status: DISCONTINUED | OUTPATIENT
Start: 2025-01-01 | End: 2025-01-03 | Stop reason: HOSPADM

## 2025-01-01 RX ADMIN — SODIUM CHLORIDE, PRESERVATIVE FREE 10 ML: 5 INJECTION INTRAVENOUS at 09:42

## 2025-01-01 RX ADMIN — INSULIN LISPRO 8 UNITS: 100 INJECTION, SOLUTION INTRAVENOUS; SUBCUTANEOUS at 12:25

## 2025-01-01 RX ADMIN — MORPHINE SULFATE 2 MG: 2 INJECTION, SOLUTION INTRAMUSCULAR; INTRAVENOUS at 09:56

## 2025-01-01 RX ADMIN — ARFORMOTEROL TARTRATE: 15 SOLUTION RESPIRATORY (INHALATION) at 07:13

## 2025-01-01 RX ADMIN — PANTOPRAZOLE SODIUM 40 MG: 40 TABLET, DELAYED RELEASE ORAL at 16:36

## 2025-01-01 RX ADMIN — PIPERACILLIN AND TAZOBACTAM 3375 MG: 3; .375 INJECTION, POWDER, LYOPHILIZED, FOR SOLUTION INTRAVENOUS at 16:36

## 2025-01-01 RX ADMIN — ONDANSETRON 4 MG: 2 INJECTION INTRAMUSCULAR; INTRAVENOUS at 20:19

## 2025-01-01 RX ADMIN — DULOXETINE HYDROCHLORIDE 60 MG: 60 CAPSULE, DELAYED RELEASE ORAL at 09:41

## 2025-01-01 RX ADMIN — ONDANSETRON 4 MG: 2 INJECTION INTRAMUSCULAR; INTRAVENOUS at 09:56

## 2025-01-01 RX ADMIN — MORPHINE SULFATE 2 MG: 2 INJECTION, SOLUTION INTRAMUSCULAR; INTRAVENOUS at 20:19

## 2025-01-01 RX ADMIN — PANTOPRAZOLE SODIUM 40 MG: 40 TABLET, DELAYED RELEASE ORAL at 06:33

## 2025-01-01 RX ADMIN — ARFORMOTEROL TARTRATE: 15 SOLUTION RESPIRATORY (INHALATION) at 19:57

## 2025-01-01 RX ADMIN — PIPERACILLIN AND TAZOBACTAM 3375 MG: 3; .375 INJECTION, POWDER, LYOPHILIZED, FOR SOLUTION INTRAVENOUS at 00:30

## 2025-01-01 RX ADMIN — INSULIN LISPRO 6 UNITS: 100 INJECTION, SOLUTION INTRAVENOUS; SUBCUTANEOUS at 17:07

## 2025-01-01 RX ADMIN — GABAPENTIN 800 MG: 400 CAPSULE ORAL at 09:41

## 2025-01-01 RX ADMIN — GABAPENTIN 800 MG: 400 CAPSULE ORAL at 20:18

## 2025-01-01 RX ADMIN — INSULIN LISPRO 2 UNITS: 100 INJECTION, SOLUTION INTRAVENOUS; SUBCUTANEOUS at 20:32

## 2025-01-01 RX ADMIN — PIPERACILLIN AND TAZOBACTAM 3375 MG: 3; .375 INJECTION, POWDER, LYOPHILIZED, FOR SOLUTION INTRAVENOUS at 09:43

## 2025-01-01 RX ADMIN — INSULIN LISPRO 4 UNITS: 100 INJECTION, SOLUTION INTRAVENOUS; SUBCUTANEOUS at 09:42

## 2025-01-01 RX ADMIN — MONTELUKAST 10 MG: 10 TABLET, FILM COATED ORAL at 20:19

## 2025-01-01 RX ADMIN — SODIUM CHLORIDE, PRESERVATIVE FREE 10 ML: 5 INJECTION INTRAVENOUS at 20:33

## 2025-01-01 RX ADMIN — GABAPENTIN 800 MG: 400 CAPSULE ORAL at 14:10

## 2025-01-01 ASSESSMENT — PAIN DESCRIPTION - DESCRIPTORS
DESCRIPTORS: ACHING;DISCOMFORT
DESCRIPTORS: THROBBING
DESCRIPTORS: ACHING;DISCOMFORT

## 2025-01-01 ASSESSMENT — PAIN - FUNCTIONAL ASSESSMENT
PAIN_FUNCTIONAL_ASSESSMENT: ACTIVITIES ARE NOT PREVENTED

## 2025-01-01 ASSESSMENT — PAIN SCALES - GENERAL
PAINLEVEL_OUTOF10: 5
PAINLEVEL_OUTOF10: 8
PAINLEVEL_OUTOF10: 0
PAINLEVEL_OUTOF10: 9
PAINLEVEL_OUTOF10: 9
PAINLEVEL_OUTOF10: 8

## 2025-01-01 ASSESSMENT — PAIN DESCRIPTION - LOCATION
LOCATION: ABDOMEN
LOCATION: ABDOMEN
LOCATION: GENERALIZED;BACK;ABDOMEN

## 2025-01-01 ASSESSMENT — PAIN DESCRIPTION - ORIENTATION
ORIENTATION: RIGHT;LEFT
ORIENTATION: RIGHT
ORIENTATION: RIGHT;UPPER;MID

## 2025-01-01 NOTE — PROGRESS NOTES
Cirrhosis (HCC)  Resolved Problems:    Pancreatitis      Objective:   Patient Vitals for the past 24 hrs:   Temp Pulse Resp BP SpO2   01/01/25 1026 -- -- 14 -- --   01/01/25 0956 -- -- 16 -- --   01/01/25 0717 -- -- -- -- 96 %   01/01/25 0700 98.2 °F (36.8 °C) 59 16 105/65 98 %   01/01/25 0640 98.2 °F (36.8 °C) 88 -- 120/65 99 %   01/01/25 0400 98.4 °F (36.9 °C) -- 15 109/70 95 %   01/01/25 0230 98 °F (36.7 °C) -- 15 112/65 96 %   12/31/24 2053 -- 91 18 -- 94 %   12/31/24 1650 98.9 °F (37.2 °C) 92 16 (!) 111/55 96 %   12/31/24 1645 -- 87 18 (!) 103/59 92 %   12/31/24 1640 -- 89 16 (!) 98/53 91 %   12/31/24 1639 -- 89 16 (!) 104/55 92 %   12/31/24 1635 -- 79 16 (!) 104/55 92 %   12/31/24 1630 -- 94 15 (!) 103/59 98 %   12/31/24 1625 -- 93 16 (!) 113/55 99 %   12/31/24 1620 -- 91 16 (!) 112/59 98 %   12/31/24 1617 99 °F (37.2 °C) 93 16 110/70 98 %   12/31/24 1445 97.8 °F (36.6 °C) 76 18 117/63 (!) 88 %       Oxygen Therapy  SpO2: 96 %  Pulse via Oximetry: 91 beats per minute  Pulse Oximeter Device Mode: Intermittent  Pulse Oximeter Device Location: Left, Hand  O2 Device: None (Room air)  O2 Flow Rate (L/min): 3 L/min    Estimated body mass index is 32.49 kg/m² as calculated from the following:    Height as of this encounter: 1.753 m (5' 9\").    Weight as of this encounter: 99.8 kg (220 lb).    Intake/Output Summary (Last 24 hours) at 1/1/2025 1326  Last data filed at 12/31/2024 1813  Gross per 24 hour   Intake 1196.95 ml   Output 0 ml   Net 1196.95 ml         Physical Exam:     General:    Well nourished.    Head:  Normocephalic, atraumatic  Eyes:  Sclerae icterus.  Pupils equally round.  ENT:  Nares appear normal.  Moist oral mucosa  Neck:  No restricted ROM.  Trachea midline   CV:   RRR.  No m/r/g.  No jugular venous distension.  Lungs:   CTAB.  No wheezing, Symmetric expansion.  Abdomen:   Soft, +tender in epigastric and RUQ,, nondistended.  Extremities: No cyanosis or clubbing.  No edema  Skin:     No rashes.   10 mg Oral Nightly    pantoprazole (PROTONIX) tablet 40 mg  40 mg Oral BID AC       Signed:  Armaan Gurrola MD    Part of this note may have been written by using a voice dictation software.  The note has been proof read but may still contain some grammatical/other typographical errors.

## 2025-01-01 NOTE — PROGRESS NOTES
Stool sample sent per order. Pt is resting in bed without any complaints. Hourly rounds completed and all needs met. Bed is low, locked,call light is in reach and pt is encouraged to call for assistance.

## 2025-01-01 NOTE — PROGRESS NOTES
Hourly rounds complete this shift, no new complaints at this time,  bed in low, locked position, call light and bedside table within reach,  all needs met. Report to day shift nurse.

## 2025-01-01 NOTE — PROGRESS NOTES
Hourly rounds complete this shift, no new complaints at this time,: bed in low, locked position, call light and bedside table within reach,  all needs met. Report to day shift nurse.

## 2025-01-02 PROBLEM — I86.8 SPLENIC VARICES: Status: ACTIVE | Noted: 2025-01-02

## 2025-01-02 PROBLEM — K76.6 PORTAL HYPERTENSION (HCC): Status: ACTIVE | Noted: 2025-01-02

## 2025-01-02 PROBLEM — R16.2 HEPATOSPLENOMEGALY: Status: ACTIVE | Noted: 2025-01-02

## 2025-01-02 LAB
ALBUMIN SERPL-MCNC: 1.8 G/DL (ref 3.5–5)
ALBUMIN/GLOB SERPL: 0.5 (ref 1–1.9)
ALP SERPL-CCNC: 172 U/L (ref 35–104)
ALT SERPL-CCNC: 16 U/L (ref 8–45)
ANION GAP SERPL CALC-SCNC: 7 MMOL/L (ref 7–16)
AST SERPL-CCNC: 15 U/L (ref 15–37)
BASOPHILS # BLD: 0 K/UL (ref 0–0.2)
BASOPHILS NFR BLD: 0 % (ref 0–2)
BILIRUB SERPL-MCNC: 0.8 MG/DL (ref 0–1.2)
BUN SERPL-MCNC: 5 MG/DL (ref 6–23)
C. DIFFICILE TOXIN MOLECULAR: NEGATIVE
CALCIUM SERPL-MCNC: 8.1 MG/DL (ref 8.8–10.2)
CHLORIDE SERPL-SCNC: 101 MMOL/L (ref 98–107)
CO2 SERPL-SCNC: 25 MMOL/L (ref 20–29)
CREAT SERPL-MCNC: 0.67 MG/DL (ref 0.6–1.1)
DIFFERENTIAL METHOD BLD: ABNORMAL
EOSINOPHIL # BLD: 0.1 K/UL (ref 0–0.8)
EOSINOPHIL NFR BLD: 3 % (ref 0.5–7.8)
ERYTHROCYTE [DISTWIDTH] IN BLOOD BY AUTOMATED COUNT: 13.2 % (ref 11.9–14.6)
GLOBULIN SER CALC-MCNC: 3.5 G/DL (ref 2.3–3.5)
GLUCOSE BLD STRIP.AUTO-MCNC: 201 MG/DL (ref 65–100)
GLUCOSE BLD STRIP.AUTO-MCNC: 203 MG/DL (ref 65–100)
GLUCOSE BLD STRIP.AUTO-MCNC: 267 MG/DL (ref 65–100)
GLUCOSE BLD STRIP.AUTO-MCNC: 414 MG/DL (ref 65–100)
GLUCOSE SERPL-MCNC: 409 MG/DL (ref 70–99)
HCT VFR BLD AUTO: 33.1 % (ref 35.8–46.3)
HGB BLD-MCNC: 11.1 G/DL (ref 11.7–15.4)
IMM GRANULOCYTES # BLD AUTO: 0 K/UL (ref 0–0.5)
IMM GRANULOCYTES NFR BLD AUTO: 0 % (ref 0–5)
LYMPHOCYTES # BLD: 1.1 K/UL (ref 0.5–4.6)
LYMPHOCYTES NFR BLD: 24 % (ref 13–44)
MCH RBC QN AUTO: 30.8 PG (ref 26.1–32.9)
MCHC RBC AUTO-ENTMCNC: 33.5 G/DL (ref 31.4–35)
MCV RBC AUTO: 91.9 FL (ref 82–102)
MONOCYTES # BLD: 0.4 K/UL (ref 0.1–1.3)
MONOCYTES NFR BLD: 10 % (ref 4–12)
NEUTS SEG # BLD: 2.8 K/UL (ref 1.7–8.2)
NEUTS SEG NFR BLD: 63 % (ref 43–78)
NRBC # BLD: 0 K/UL (ref 0–0.2)
PLATELET # BLD AUTO: 180 K/UL (ref 150–450)
PMV BLD AUTO: 10 FL (ref 9.4–12.3)
POTASSIUM SERPL-SCNC: 4 MMOL/L (ref 3.5–5.1)
PROT SERPL-MCNC: 5.2 G/DL (ref 6.3–8.2)
RBC # BLD AUTO: 3.6 M/UL (ref 4.05–5.2)
SERVICE CMNT-IMP: ABNORMAL
SODIUM SERPL-SCNC: 133 MMOL/L (ref 136–145)
WBC # BLD AUTO: 4.5 K/UL (ref 4.3–11.1)

## 2025-01-02 PROCEDURE — 99232 SBSQ HOSP IP/OBS MODERATE 35: CPT | Performed by: INTERNAL MEDICINE

## 2025-01-02 PROCEDURE — 2580000003 HC RX 258: Performed by: FAMILY MEDICINE

## 2025-01-02 PROCEDURE — 6360000002 HC RX W HCPCS: Performed by: FAMILY MEDICINE

## 2025-01-02 PROCEDURE — 80053 COMPREHEN METABOLIC PANEL: CPT

## 2025-01-02 PROCEDURE — 36415 COLL VENOUS BLD VENIPUNCTURE: CPT

## 2025-01-02 PROCEDURE — 6370000000 HC RX 637 (ALT 250 FOR IP): Performed by: INTERNAL MEDICINE

## 2025-01-02 PROCEDURE — 6370000000 HC RX 637 (ALT 250 FOR IP): Performed by: PHYSICIAN ASSISTANT

## 2025-01-02 PROCEDURE — 94640 AIRWAY INHALATION TREATMENT: CPT

## 2025-01-02 PROCEDURE — 2500000003 HC RX 250 WO HCPCS: Performed by: FAMILY MEDICINE

## 2025-01-02 PROCEDURE — 94760 N-INVAS EAR/PLS OXIMETRY 1: CPT

## 2025-01-02 PROCEDURE — 82962 GLUCOSE BLOOD TEST: CPT

## 2025-01-02 PROCEDURE — 6370000000 HC RX 637 (ALT 250 FOR IP): Performed by: FAMILY MEDICINE

## 2025-01-02 PROCEDURE — 85025 COMPLETE CBC W/AUTO DIFF WBC: CPT

## 2025-01-02 PROCEDURE — 1100000000 HC RM PRIVATE

## 2025-01-02 RX ORDER — INSULIN LISPRO 100 [IU]/ML
10 INJECTION, SOLUTION INTRAVENOUS; SUBCUTANEOUS ONCE
Status: COMPLETED | OUTPATIENT
Start: 2025-01-02 | End: 2025-01-02

## 2025-01-02 RX ORDER — INSULIN LISPRO 100 [IU]/ML
8 INJECTION, SOLUTION INTRAVENOUS; SUBCUTANEOUS
Status: DISCONTINUED | OUTPATIENT
Start: 2025-01-02 | End: 2025-01-03 | Stop reason: HOSPADM

## 2025-01-02 RX ORDER — MELOXICAM 7.5 MG/1
15 TABLET ORAL DAILY
Status: DISCONTINUED | OUTPATIENT
Start: 2025-01-02 | End: 2025-01-03 | Stop reason: HOSPADM

## 2025-01-02 RX ORDER — INSULIN GLARGINE 100 [IU]/ML
35 INJECTION, SOLUTION SUBCUTANEOUS 2 TIMES DAILY
Status: DISCONTINUED | OUTPATIENT
Start: 2025-01-02 | End: 2025-01-03

## 2025-01-02 RX ORDER — FUROSEMIDE 20 MG/1
20 TABLET ORAL DAILY
Status: DISCONTINUED | OUTPATIENT
Start: 2025-01-02 | End: 2025-01-02

## 2025-01-02 RX ADMIN — PANTOPRAZOLE SODIUM 40 MG: 40 TABLET, DELAYED RELEASE ORAL at 15:54

## 2025-01-02 RX ADMIN — INSULIN LISPRO 8 UNITS: 100 INJECTION, SOLUTION INTRAVENOUS; SUBCUTANEOUS at 08:18

## 2025-01-02 RX ADMIN — SODIUM CHLORIDE, PRESERVATIVE FREE 10 ML: 5 INJECTION INTRAVENOUS at 08:08

## 2025-01-02 RX ADMIN — INSULIN LISPRO 8 UNITS: 100 INJECTION, SOLUTION INTRAVENOUS; SUBCUTANEOUS at 16:27

## 2025-01-02 RX ADMIN — INSULIN GLARGINE 35 UNITS: 100 INJECTION, SOLUTION SUBCUTANEOUS at 20:15

## 2025-01-02 RX ADMIN — PIPERACILLIN AND TAZOBACTAM 3375 MG: 3; .375 INJECTION, POWDER, LYOPHILIZED, FOR SOLUTION INTRAVENOUS at 00:12

## 2025-01-02 RX ADMIN — ONDANSETRON 4 MG: 4 TABLET, ORALLY DISINTEGRATING ORAL at 00:20

## 2025-01-02 RX ADMIN — ARFORMOTEROL TARTRATE: 15 SOLUTION RESPIRATORY (INHALATION) at 07:31

## 2025-01-02 RX ADMIN — OXYCODONE 5 MG: 5 TABLET ORAL at 23:49

## 2025-01-02 RX ADMIN — INSULIN LISPRO 2 UNITS: 100 INJECTION, SOLUTION INTRAVENOUS; SUBCUTANEOUS at 16:27

## 2025-01-02 RX ADMIN — PIPERACILLIN AND TAZOBACTAM 3375 MG: 3; .375 INJECTION, POWDER, LYOPHILIZED, FOR SOLUTION INTRAVENOUS at 23:55

## 2025-01-02 RX ADMIN — SODIUM CHLORIDE, PRESERVATIVE FREE 10 ML: 5 INJECTION INTRAVENOUS at 20:16

## 2025-01-02 RX ADMIN — INSULIN GLARGINE 35 UNITS: 100 INJECTION, SOLUTION SUBCUTANEOUS at 09:56

## 2025-01-02 RX ADMIN — DULOXETINE HYDROCHLORIDE 60 MG: 60 CAPSULE, DELAYED RELEASE ORAL at 08:09

## 2025-01-02 RX ADMIN — INSULIN LISPRO 4 UNITS: 100 INJECTION, SOLUTION INTRAVENOUS; SUBCUTANEOUS at 11:23

## 2025-01-02 RX ADMIN — INSULIN LISPRO 8 UNITS: 100 INJECTION, SOLUTION INTRAVENOUS; SUBCUTANEOUS at 11:23

## 2025-01-02 RX ADMIN — PIPERACILLIN AND TAZOBACTAM 3375 MG: 3; .375 INJECTION, POWDER, LYOPHILIZED, FOR SOLUTION INTRAVENOUS at 08:12

## 2025-01-02 RX ADMIN — SPIRONOLACTONE 25 MG: 25 TABLET ORAL at 08:09

## 2025-01-02 RX ADMIN — PIPERACILLIN AND TAZOBACTAM 3375 MG: 3; .375 INJECTION, POWDER, LYOPHILIZED, FOR SOLUTION INTRAVENOUS at 15:53

## 2025-01-02 RX ADMIN — FUROSEMIDE 20 MG: 20 TABLET ORAL at 08:08

## 2025-01-02 RX ADMIN — PANTOPRAZOLE SODIUM 40 MG: 40 TABLET, DELAYED RELEASE ORAL at 05:56

## 2025-01-02 RX ADMIN — MELOXICAM 15 MG: 7.5 TABLET ORAL at 17:27

## 2025-01-02 RX ADMIN — MORPHINE SULFATE 2 MG: 2 INJECTION, SOLUTION INTRAMUSCULAR; INTRAVENOUS at 20:16

## 2025-01-02 RX ADMIN — GABAPENTIN 800 MG: 400 CAPSULE ORAL at 08:09

## 2025-01-02 RX ADMIN — MORPHINE SULFATE 2 MG: 2 INJECTION, SOLUTION INTRAMUSCULAR; INTRAVENOUS at 08:17

## 2025-01-02 RX ADMIN — ONDANSETRON 4 MG: 2 INJECTION INTRAMUSCULAR; INTRAVENOUS at 08:18

## 2025-01-02 RX ADMIN — ONDANSETRON 4 MG: 2 INJECTION INTRAMUSCULAR; INTRAVENOUS at 20:16

## 2025-01-02 RX ADMIN — TRAZODONE HYDROCHLORIDE 50 MG: 50 TABLET ORAL at 20:15

## 2025-01-02 RX ADMIN — MORPHINE SULFATE 2 MG: 2 INJECTION, SOLUTION INTRAMUSCULAR; INTRAVENOUS at 00:20

## 2025-01-02 RX ADMIN — INSULIN LISPRO 10 UNITS: 100 INJECTION, SOLUTION INTRAVENOUS; SUBCUTANEOUS at 08:18

## 2025-01-02 RX ADMIN — GABAPENTIN 800 MG: 400 CAPSULE ORAL at 13:57

## 2025-01-02 RX ADMIN — LISINOPRIL 10 MG: 5 TABLET ORAL at 08:08

## 2025-01-02 RX ADMIN — MORPHINE SULFATE 2 MG: 2 INJECTION, SOLUTION INTRAMUSCULAR; INTRAVENOUS at 14:00

## 2025-01-02 RX ADMIN — GABAPENTIN 800 MG: 400 CAPSULE ORAL at 20:15

## 2025-01-02 RX ADMIN — ONDANSETRON 4 MG: 2 INJECTION INTRAMUSCULAR; INTRAVENOUS at 14:00

## 2025-01-02 RX ADMIN — INSULIN LISPRO 2 UNITS: 100 INJECTION, SOLUTION INTRAVENOUS; SUBCUTANEOUS at 20:15

## 2025-01-02 RX ADMIN — MONTELUKAST 10 MG: 10 TABLET, FILM COATED ORAL at 20:15

## 2025-01-02 ASSESSMENT — ENCOUNTER SYMPTOMS
CONSTIPATION: 0
ABDOMINAL DISTENTION: 0
NAUSEA: 0
TROUBLE SWALLOWING: 0
SHORTNESS OF BREATH: 0
DIARRHEA: 0
VOMITING: 0
BLOOD IN STOOL: 0
COLOR CHANGE: 0
ABDOMINAL PAIN: 0

## 2025-01-02 ASSESSMENT — PAIN DESCRIPTION - DESCRIPTORS
DESCRIPTORS: DISCOMFORT
DESCRIPTORS: DISCOMFORT
DESCRIPTORS: ACHING
DESCRIPTORS: ACHING
DESCRIPTORS: ACHING;DISCOMFORT

## 2025-01-02 ASSESSMENT — PAIN DESCRIPTION - ORIENTATION
ORIENTATION: RIGHT;LEFT
ORIENTATION: RIGHT
ORIENTATION: RIGHT

## 2025-01-02 ASSESSMENT — PAIN SCALES - GENERAL
PAINLEVEL_OUTOF10: 8
PAINLEVEL_OUTOF10: 6
PAINLEVEL_OUTOF10: 8
PAINLEVEL_OUTOF10: 0
PAINLEVEL_OUTOF10: 8

## 2025-01-02 ASSESSMENT — PAIN DESCRIPTION - LOCATION
LOCATION: GENERALIZED
LOCATION: GENERALIZED
LOCATION: ABDOMEN

## 2025-01-02 ASSESSMENT — PAIN - FUNCTIONAL ASSESSMENT: PAIN_FUNCTIONAL_ASSESSMENT: ACTIVITIES ARE NOT PREVENTED

## 2025-01-02 NOTE — PLAN OF CARE
Problem: Discharge Planning  Goal: Discharge to home or other facility with appropriate resources  1/1/2025 2259 by Jodee Morocho RN  Outcome: Progressing  1/1/2025 1536 by Darlin Mcdonnell RN  Outcome: Progressing     Problem: Safety - Adult  Goal: Free from fall injury  1/1/2025 2259 by Jodee Morocho RN  Outcome: Progressing  1/1/2025 1536 by Darlin Mcdonnell RN  Outcome: Progressing     Problem: Chronic Conditions and Co-morbidities  Goal: Patient's chronic conditions and co-morbidity symptoms are monitored and maintained or improved  1/1/2025 2259 by Jodee Morocho RN  Outcome: Progressing  1/1/2025 1536 by Darlin Mcdonnell RN  Outcome: Progressing     Problem: Pain  Goal: Verbalizes/displays adequate comfort level or baseline comfort level  1/1/2025 2259 by Jodee Morocho RN  Outcome: Progressing  1/1/2025 1536 by Darlin Mcdonnell RN  Outcome: Progressing

## 2025-01-02 NOTE — PROGRESS NOTES
Assessment/Plan  Ms. Miguel A Zambrano is a 59 y.o. adult p status post ERCP with stone removal and stent placement.  Follow-up with Dr. Guzman in 6 to 8 weeks weeks for stent removal.  LFTs improving.  Cholecystectomy when able by surgery.  In regards to her cirrhosis recommend outpatient monitoring in GI clinic.    MEDs:     Current Facility-Administered Medications   Medication Dose Route Frequency Provider Last Rate Last Admin    insulin lispro (HUMALOG,ADMELOG) injection vial 8 Units  8 Units SubCUTAneous TID WC Armaan Gurrola MD   8 Units at 01/02/25 1123    insulin glargine (LANTUS) injection vial 35 Units  35 Units SubCUTAneous BID Armaan Gurrola MD   35 Units at 01/02/25 0956    insulin lispro (HUMALOG,ADMELOG) injection vial 0-8 Units  0-8 Units SubCUTAneous 4x Daily AC & HS Armaan Gurrola MD   4 Units at 01/02/25 1123    arformoterol 15 mcg-budesonide 0.5 mg neb solution   Nebulization BID RT Rosa Ambrosio MD   Given at 01/02/25 0731    cetirizine (ZYRTEC) tablet 10 mg  10 mg Oral Daily PRN Rosa Ambrosio MD        furosemide (LASIX) tablet 20 mg  20 mg Oral Daily Rosa Ambrosio MD   20 mg at 01/02/25 0808    gabapentin (NEURONTIN) capsule 800 mg  800 mg Oral TID Rosa Ambrosio MD   800 mg at 01/02/25 0809    lisinopril (PRINIVIL;ZESTRIL) tablet 10 mg  10 mg Oral Daily Rosa Ambrosio MD   10 mg at 01/02/25 0808    methocarbamol (ROBAXIN) tablet 750 mg  750 mg Oral TID PRN Rosa Ambrosio MD        [Held by provider] rivaroxaban (XARELTO) tablet 20 mg  20 mg Oral Dinner Rosa Ambrosio MD        sodium chloride flush 0.9 % injection 5-40 mL  5-40 mL IntraVENous 2 times per day Rosa Ambrosio MD   10 mL at 01/02/25 0808    sodium chloride flush 0.9 % injection 5-40 mL  5-40 mL IntraVENous PRN Rosa Ambrosio MD        0.9 % sodium chloride infusion   IntraVENous PRN Rosa Ambrosio MD        potassium chloride (KLOR-CON M) extended release tablet 40 mEq  40 mEq Oral PRN

## 2025-01-02 NOTE — PROGRESS NOTES
Hourly rounds performed this shift. Patient resting with no complaints at this time. Bed locked in lowest position, call light, and all personal belongings in pt reach. VS stable, IV patent. Patient received prn pain medication during this shift. will give report to oncoming nurse.

## 2025-01-02 NOTE — PROGRESS NOTES
Hospitalist Progress Note   Admit Date:  2024  1:24 AM   Name:  Miguel A Zambrano   Age:  59 y.o.  Sex:  adult  :  1965   MRN:  [id number]   Room:  [facility]    Presenting/Chief Complaint: No chief complaint on file.     Reason(s) for Admission: Acute pancreatitis, unspecified complication status, unspecified pancreatitis type [K85.90]     Hospital Course:   Please refer to the admission H&P for details of presentation.      In summary, Miguel A Zambrano is a 59 y.o. transgender female with medical history significant for HTN, DM2, CHF, PE on Xarelto , liver cirrhosis,  hx of hep B and C who presented to the [place] ED with epigastric abdominal pain associated with n/v. Also reporting melena as well as bloody emesis.   Workup in the ED with Na of 131, Mg of 1.7, elevated LFTs( , , t.bili 5.8), lipase > 3000, wbc 10.9 and Hb of 15.8.  CT abdomen/pelvis showed inflammatory changes around the gallbladder concerning for cholecystitis.    GI was consulted and patient underwent EUS.  Patient noted to have CBD stone/sludge and pus.  Sphincterotomy performed successfully, followed by stone removal and stent placement.   General surgery was also consulted and no urgent cholecystectomy was recommended at this time.    Subjective/24 hr Events (25) :  Patient is seen and examined at bedside.    Laying in bed.  Tolerating regular diet.  Has not had bowel movement since yesterday.  No longer having any abdominal pain.  Denies any nausea or vomiting.    Assessment & Plan:     Gallstone pancreatitis  Cholecystitis  Elevated lipase of > 3000 on admit with LFT elevations  Status post EUS with CBD stones and sludge and pus noted.Sphincterotomy performed successfully, followed by stone removal and stent placement.   - GI and general surgery appreciated: will need [facility]. Will need Follow up with GI Dr Guzman for repeat ERCP 6-8 weeks for stent  FL    nRBC 0.00 0.0 - 0.2 K/uL    Differential Type AUTOMATED      Neutrophils % 63 43 - 78 %    Lymphocytes % 24 13 - 44 %    Monocytes % 10 4.0 - 12.0 %    Eosinophils % 3 0.5 - 7.8 %    Basophils % 0 0.0 - 2.0 %    Immature Granulocytes % 0 0.0 - 5.0 %    Neutrophils Absolute 2.8 1.7 - 8.2 K/UL    Lymphocytes Absolute 1.1 0.5 - 4.6 K/UL    Monocytes Absolute 0.4 0.1 - 1.3 K/UL    Eosinophils Absolute 0.1 0.0 - 0.8 K/UL    Basophils Absolute 0.0 0.0 - 0.2 K/UL    Immature Granulocytes Absolute 0.0 0.0 - 0.5 K/UL   POCT Glucose    Collection Time: 01/02/25  8:03 AM   Result Value Ref Range    POC Glucose 414 (H) 65 - 100 mg/dL    Performed by: Don    POCT Glucose    Collection Time: 01/02/25 11:16 AM   Result Value Ref Range    POC Glucose 267 (H) 65 - 100 mg/dL    Performed by: Don        No results for input(s): \"COVID19\" in the last 72 hours.    Current Meds:  Current Facility-Administered Medications   Medication Dose Route Frequency    insulin lispro (HUMALOG,ADMELOG) injection vial 8 Units  8 Units SubCUTAneous TID WC    insulin glargine (LANTUS) injection vial 35 Units  35 Units SubCUTAneous BID    insulin lispro (HUMALOG,ADMELOG) injection vial 0-8 Units  0-8 Units SubCUTAneous 4x Daily AC & HS    arformoterol 15 mcg-budesonide 0.5 mg neb solution   Nebulization BID RT    cetirizine (ZYRTEC) tablet 10 mg  10 mg Oral Daily PRN    furosemide (LASIX) tablet 20 mg  20 mg Oral Daily    gabapentin (NEURONTIN) capsule 800 mg  800 mg Oral TID    lisinopril (PRINIVIL;ZESTRIL) tablet 10 mg  10 mg Oral Daily    methocarbamol (ROBAXIN) tablet 750 mg  750 mg Oral TID PRN    [Held by provider] rivaroxaban (XARELTO) tablet 20 mg  20 mg Oral Dinner    sodium chloride flush 0.9 % injection 5-40 mL  5-40 mL IntraVENous 2 times per day    sodium chloride flush 0.9 % injection 5-40 mL  5-40 mL IntraVENous PRN    0.9 % sodium chloride infusion   IntraVENous PRN    potassium chloride

## 2025-01-02 NOTE — CARE COORDINATION
There is a CM consult to set up an OP transplant surgery appointment [place] [facility] for dx recent cholangitis, needs cholecystectomy, cirrhosis with hepatosplenomegaly and large splenic varices.  Unit secretary called [facility] and spoke to Jorge.  Jorge said send referral to them at fax [phone].  CM faxed 27 page referral with request for appointment.  CM awaiting their call-back.  CM updated patient in room 617.

## 2025-01-02 NOTE — PROGRESS NOTES
General Surgery Brief Note    Patient will require Cholecystectomy  Referral [facility] in the setting of Cirrhosis, Hepatosplenomegaly with Portal HTN and Splenic varices.    Asked CM to obtain OP Appt with [facility] clinic for this patient asap.  Hope to have this appt prior to dc tomorrow.     Plan: DC on oral antibiotics for cholangitis (resolving)  Needs Cholecystectomy 4-6 weeks at [facility]  Follow up with GI Dr Guzman for repeat ERCP 6-8 weeks for stent removal/exchange.     Hafsa Martinez APRN-CNP      Xray Result (most recent):  ERCP: 12/31/2024  Impression:  --CBD stones / sludge and pus. Sphincterotomy performed successfully, followed by stone removal and stent placement. Spyglass was also performed          CT Result (most recent):  CT ABDOMEN PELVIS W IV CONTRAST 12/30/2024    Narrative  CT OF THE ABDOMEN AND PELVIS    INDICATION: Epigastric pain, hematemesis, pancreatitis, elevated bilirubin,  history of cirrhosis    TECHNIQUE: Multiple 2D axial images were obtained through the abdomen and  pelvis.  Oral contrast was used for bowel opacification.  100mL of Isovue 370  intravenous contrast was used for better evaluation of solid organs and vascular  structures.  Radiation dose reduction techniques were used for this study.  All  CT scans performed at this facility use one or all of the following: Automated  exposure control, adjustment of the mA and/or kVp according to patient's size,  iterative reconstruction.    COMPARISON: 5/1/2024    FINDINGS:  - LUNG BASES: Coronary artery calcifications.    - LIVER: Nodularity in the liver.  - GALLBLADDER/BILE DUCTS: Fat stranding and heterogeneity in the gallbladder.  - PANCREAS: Normal.  - SPLEEN: Normal.    - ADRENALS: Normal.  - KIDNEYS/URETERS: No hydronephrosis or significant mass. Bilateral renal cysts.  - BLADDER: Normal.  - REPRODUCTIVE ORGANS: No pelvic masses.    - BOWEL: Normal caliber.  No inflammatory changes.

## 2025-01-03 VITALS
HEART RATE: 112 BPM | HEIGHT: 69 IN | TEMPERATURE: 98.2 F | OXYGEN SATURATION: 90 % | WEIGHT: 220 LBS | BODY MASS INDEX: 32.58 KG/M2 | SYSTOLIC BLOOD PRESSURE: 88 MMHG | DIASTOLIC BLOOD PRESSURE: 55 MMHG | RESPIRATION RATE: 14 BRPM

## 2025-01-03 LAB
ALBUMIN SERPL-MCNC: 1.7 G/DL (ref 3.5–5)
ALBUMIN/GLOB SERPL: 0.5 (ref 1–1.9)
ALP SERPL-CCNC: 162 U/L (ref 35–104)
ALT SERPL-CCNC: 13 U/L (ref 8–45)
ANION GAP SERPL CALC-SCNC: 4 MMOL/L (ref 7–16)
AST SERPL-CCNC: 13 U/L (ref 15–37)
BILIRUB SERPL-MCNC: 0.8 MG/DL (ref 0–1.2)
BUN SERPL-MCNC: 7 MG/DL (ref 6–23)
CALCIUM SERPL-MCNC: 8.6 MG/DL (ref 8.8–10.2)
CHLORIDE SERPL-SCNC: 99 MMOL/L (ref 98–107)
CO2 SERPL-SCNC: 31 MMOL/L (ref 20–29)
CREAT SERPL-MCNC: 0.78 MG/DL (ref 0.6–1.1)
GLOBULIN SER CALC-MCNC: 3.6 G/DL (ref 2.3–3.5)
GLUCOSE BLD STRIP.AUTO-MCNC: 223 MG/DL (ref 65–100)
GLUCOSE BLD STRIP.AUTO-MCNC: 315 MG/DL (ref 65–100)
GLUCOSE SERPL-MCNC: 304 MG/DL (ref 70–99)
POTASSIUM SERPL-SCNC: 4.4 MMOL/L (ref 3.5–5.1)
PROT SERPL-MCNC: 5.3 G/DL (ref 6.3–8.2)
SERVICE CMNT-IMP: ABNORMAL
SERVICE CMNT-IMP: ABNORMAL
SODIUM SERPL-SCNC: 134 MMOL/L (ref 136–145)

## 2025-01-03 PROCEDURE — 6370000000 HC RX 637 (ALT 250 FOR IP): Performed by: INTERNAL MEDICINE

## 2025-01-03 PROCEDURE — 94640 AIRWAY INHALATION TREATMENT: CPT

## 2025-01-03 PROCEDURE — 94760 N-INVAS EAR/PLS OXIMETRY 1: CPT

## 2025-01-03 PROCEDURE — 80053 COMPREHEN METABOLIC PANEL: CPT

## 2025-01-03 PROCEDURE — 6370000000 HC RX 637 (ALT 250 FOR IP): Performed by: FAMILY MEDICINE

## 2025-01-03 PROCEDURE — 36415 COLL VENOUS BLD VENIPUNCTURE: CPT

## 2025-01-03 PROCEDURE — 2500000003 HC RX 250 WO HCPCS: Performed by: FAMILY MEDICINE

## 2025-01-03 PROCEDURE — 82962 GLUCOSE BLOOD TEST: CPT

## 2025-01-03 PROCEDURE — 6360000002 HC RX W HCPCS: Performed by: FAMILY MEDICINE

## 2025-01-03 PROCEDURE — 6370000000 HC RX 637 (ALT 250 FOR IP): Performed by: PHYSICIAN ASSISTANT

## 2025-01-03 PROCEDURE — 2580000003 HC RX 258: Performed by: FAMILY MEDICINE

## 2025-01-03 RX ORDER — CIPROFLOXACIN 500 MG/1
500 TABLET, FILM COATED ORAL 2 TIMES DAILY
Qty: 12 TABLET | Refills: 0 | Status: SHIPPED | OUTPATIENT
Start: 2025-01-03 | End: 2025-01-09

## 2025-01-03 RX ORDER — INSULIN LISPRO 100 [IU]/ML
INJECTION, SOLUTION INTRAVENOUS; SUBCUTANEOUS
Qty: 3 ML | Refills: 1 | Status: SHIPPED | OUTPATIENT
Start: 2025-01-03

## 2025-01-03 RX ORDER — INSULIN LISPRO 100 [IU]/ML
8 INJECTION, SOLUTION INTRAVENOUS; SUBCUTANEOUS 3 TIMES DAILY
Qty: 3 ML | Refills: 3 | Status: SHIPPED | OUTPATIENT
Start: 2025-01-03

## 2025-01-03 RX ORDER — MELOXICAM 15 MG/1
15 TABLET ORAL DAILY PRN
Qty: 30 TABLET | Refills: 0 | Status: SHIPPED | OUTPATIENT
Start: 2025-01-03 | End: 2025-02-02

## 2025-01-03 RX ORDER — METRONIDAZOLE 500 MG/1
500 TABLET ORAL 3 TIMES DAILY
Qty: 18 TABLET | Refills: 0 | Status: SHIPPED | OUTPATIENT
Start: 2025-01-03 | End: 2025-01-09

## 2025-01-03 RX ORDER — INSULIN GLARGINE 100 [IU]/ML
40 INJECTION, SOLUTION SUBCUTANEOUS 2 TIMES DAILY
Status: DISCONTINUED | OUTPATIENT
Start: 2025-01-03 | End: 2025-01-03 | Stop reason: HOSPADM

## 2025-01-03 RX ADMIN — LISINOPRIL 10 MG: 5 TABLET ORAL at 08:39

## 2025-01-03 RX ADMIN — ONDANSETRON 4 MG: 2 INJECTION INTRAMUSCULAR; INTRAVENOUS at 01:39

## 2025-01-03 RX ADMIN — GABAPENTIN 800 MG: 400 CAPSULE ORAL at 14:00

## 2025-01-03 RX ADMIN — INSULIN LISPRO 6 UNITS: 100 INJECTION, SOLUTION INTRAVENOUS; SUBCUTANEOUS at 12:26

## 2025-01-03 RX ADMIN — FUROSEMIDE 20 MG: 20 TABLET ORAL at 09:46

## 2025-01-03 RX ADMIN — INSULIN LISPRO 8 UNITS: 100 INJECTION, SOLUTION INTRAVENOUS; SUBCUTANEOUS at 12:25

## 2025-01-03 RX ADMIN — SPIRONOLACTONE 25 MG: 25 TABLET ORAL at 09:10

## 2025-01-03 RX ADMIN — MORPHINE SULFATE 2 MG: 2 INJECTION, SOLUTION INTRAMUSCULAR; INTRAVENOUS at 09:09

## 2025-01-03 RX ADMIN — ARFORMOTEROL TARTRATE: 15 SOLUTION RESPIRATORY (INHALATION) at 08:56

## 2025-01-03 RX ADMIN — MORPHINE SULFATE 2 MG: 2 INJECTION, SOLUTION INTRAMUSCULAR; INTRAVENOUS at 13:59

## 2025-01-03 RX ADMIN — PANTOPRAZOLE SODIUM 40 MG: 40 TABLET, DELAYED RELEASE ORAL at 05:00

## 2025-01-03 RX ADMIN — MELOXICAM 15 MG: 7.5 TABLET ORAL at 09:08

## 2025-01-03 RX ADMIN — INSULIN LISPRO 2 UNITS: 100 INJECTION, SOLUTION INTRAVENOUS; SUBCUTANEOUS at 08:40

## 2025-01-03 RX ADMIN — INSULIN GLARGINE 40 UNITS: 100 INJECTION, SOLUTION SUBCUTANEOUS at 09:08

## 2025-01-03 RX ADMIN — ONDANSETRON 4 MG: 2 INJECTION INTRAMUSCULAR; INTRAVENOUS at 13:59

## 2025-01-03 RX ADMIN — MORPHINE SULFATE 2 MG: 2 INJECTION, SOLUTION INTRAMUSCULAR; INTRAVENOUS at 01:39

## 2025-01-03 RX ADMIN — GABAPENTIN 800 MG: 400 CAPSULE ORAL at 09:08

## 2025-01-03 RX ADMIN — SODIUM CHLORIDE, PRESERVATIVE FREE 10 ML: 5 INJECTION INTRAVENOUS at 09:50

## 2025-01-03 RX ADMIN — PIPERACILLIN AND TAZOBACTAM 3375 MG: 3; .375 INJECTION, POWDER, LYOPHILIZED, FOR SOLUTION INTRAVENOUS at 08:39

## 2025-01-03 RX ADMIN — DULOXETINE HYDROCHLORIDE 60 MG: 60 CAPSULE, DELAYED RELEASE ORAL at 09:07

## 2025-01-03 RX ADMIN — OXYCODONE 5 MG: 5 TABLET ORAL at 12:25

## 2025-01-03 RX ADMIN — ONDANSETRON 4 MG: 2 INJECTION INTRAMUSCULAR; INTRAVENOUS at 09:09

## 2025-01-03 RX ADMIN — INSULIN LISPRO 8 UNITS: 100 INJECTION, SOLUTION INTRAVENOUS; SUBCUTANEOUS at 08:40

## 2025-01-03 ASSESSMENT — PAIN SCALES - GENERAL
PAINLEVEL_OUTOF10: 2
PAINLEVEL_OUTOF10: 8
PAINLEVEL_OUTOF10: 3
PAINLEVEL_OUTOF10: 0
PAINLEVEL_OUTOF10: 0
PAINLEVEL_OUTOF10: 7
PAINLEVEL_OUTOF10: 8
PAINLEVEL_OUTOF10: 8

## 2025-01-03 ASSESSMENT — PAIN DESCRIPTION - LOCATION: LOCATION: GENERALIZED

## 2025-01-03 ASSESSMENT — PAIN DESCRIPTION - DESCRIPTORS: DESCRIPTORS: DISCOMFORT

## 2025-01-03 NOTE — PROGRESS NOTES
Shift report received at bedside from nightshift RN. Pt is resting comfortably in bed at this time. Pt is calm, alert and oriented.

## 2025-01-03 NOTE — DISCHARGE SUMMARY
Hospitalist Discharge Summary   Admit Date:  2024  1:24 AM   DC Note date: 1/3/2025  Name:  Miguel A Zambrano   Age:  59 y.o.  Sex:  adult  :  1965   MRN:  [id number]   Room:  [facility]  PCP:  No, Pcp    Presenting Complaint: No chief complaint on file.     Initial Admission Diagnosis: Acute pancreatitis, unspecified complication status, unspecified pancreatitis type [K85.90]     Problem List for this Hospitalization (present on admission):    Principal Problem:    Acute pancreatitis, unspecified complication status, unspecified pancreatitis type  Active Problems:    Cholecystitis    Elevated LFTs    Hypertension    Type 2 diabetes mellitus (HCC)    Heart failure, unspecified (HCC)    Chronic obstructive pulmonary disease (HCC)    Pulmonary embolism (HCC)    Abnormal LFTs    Cirrhosis (HCC)    Hepatosplenomegaly    Portal hypertension (HCC)    Splenic varices  Resolved Problems:    Pancreatitis      Hospital Course:  Please refer to the admission H&P for details of presentation. In summary, Miguel A Zambrano is a 59 y.o. adult with past medical history significant for HTN, DM2, CHF, PE on Xarelto , liver cirrhosis,  hx of hep B and C who presented to the [place] ED with epigastric abdominal pain associated with n/v. Also reporting melena as well as bloody emesis.   Workup in the ED with Na of 131, Mg of 1.7, elevated LFTs( , , t.bili 5.8), lipase > 3000, wbc 10.9 and Hb of 15.8.  CT abdomen/pelvis showed inflammatory changes around the gallbladder concerning for cholecystitis.     Patient was started on IV Zosyn for cholangitis.  GI was consulted and patient underwent EUS.  Patient noted to have CBD stone/sludge and pus.  Sphincterotomy performed successfully, followed by stone removal and stent placement.   General surgery was also consulted.  Recommending cholecystectomy 4 to 6 weeks at [facility].  We have sent clinical documents as well as [place] [facility]  Time: 01/03/25 11:27 AM   Result Value Ref Range    POC Glucose 315 (H) 65 - 100 mg/dL    Performed by: Elisabeth        No results for input(s): \"COVID19\" in the last 72 hours.    Recent Vital Data:  Patient Vitals for the past 24 hrs:   Temp Pulse Resp BP SpO2   01/03/25 1255 -- -- 16 -- --   01/03/25 0856 -- 73 16 -- 96 %   01/03/25 0739 97.5 °F (36.4 °C) 83 18 105/62 96 %   01/03/25 0415 -- -- -- 102/60 --   01/03/25 0337 97.9 °F (36.6 °C) 88 18 -- 96 %   01/03/25 0209 -- -- 16 -- --   01/03/25 0139 -- -- 19 -- --   01/03/25 0019 -- -- 17 -- --   01/02/25 2349 -- -- 19 -- --   01/02/25 2250 98.1 °F (36.7 °C) 91 18 105/66 95 %   01/02/25 2046 -- -- 18 -- --   01/02/25 2016 -- -- 20 -- --   01/02/25 1925 98.8 °F (37.1 °C) 99 19 (!) 101/53 92 %   01/02/25 1545 97.9 °F (36.6 °C) 90 18 (!) 99/59 96 %       Oxygen Therapy  SpO2: 96 %  Pulse via Oximetry: 91 beats per minute  Pulse Oximeter Device Mode: Intermittent  Pulse Oximeter Device Location: Left, Hand  O2 Device: None (Room air)  O2 Flow Rate (L/min): 3 L/min    Estimated body mass index is 32.49 kg/m² as calculated from the following:    Height as of this encounter: 1.753 m (5' 9\").    Weight as of this encounter: 99.8 kg (220 lb).  No intake or output data in the 24 hours ending 01/03/25 1350      Physical Exam:    General:    Well nourished.  No overt distress  Head:  Normocephalic, atraumatic  Eyes:  Sclerae appear normal.  Pupils equally round.    HENT:  Nares appear normal, no drainage.  Moist mucous membranes  Neck:  No restricted ROM.  Trachea midline  CV:   RRR.  No m/r/g.  No JVD  Lungs:   CTAB.  No wheezing.  Respirations even, unlabored  Abdomen:   Soft, nontender, nondistended.    Extremities: Warm and dry.   No edema.    Skin:     No rashes.  Normal coloration  Neuro:  CN II-XII grossly intact.  Psych:  Normal mood and affect.      Signed:  Armaan Gurrola MD    Part of this note may have been written by using a voice dictation software.

## 2025-01-03 NOTE — CARE COORDINATION
Discharge note: Per request from Dr. Héctor MD, BLANCA faxed his 3 page note of today (1/3/2025) to attention of Jorge at [facility] in [place], [place] at fax [phone].  That was to update the 27 pages sent yesterday to [facility], with request for patient appointment.  BLANCA also requested a call-back from [facility] with a date and time for the patient's appointment at [facility].

## 2025-01-03 NOTE — PLAN OF CARE
Problem: Discharge Planning  Goal: Discharge to home or other facility with appropriate resources  1/3/2025 0951 by Camacho Salinas RN  Outcome: Progressing  1/3/2025 0424 by Sarah Romero RN  Outcome: Progressing     Problem: Safety - Adult  Goal: Free from fall injury  1/3/2025 0951 by Camacho Salinas RN  Outcome: Progressing  1/3/2025 0424 by Sarah Romero RN  Outcome: Progressing     Problem: Chronic Conditions and Co-morbidities  Goal: Patient's chronic conditions and co-morbidity symptoms are monitored and maintained or improved  1/3/2025 0951 by Camacho Salinas RN  Outcome: Progressing  1/3/2025 0424 by Sarah Romero RN  Outcome: Progressing     Problem: Pain  Goal: Verbalizes/displays adequate comfort level or baseline comfort level  1/3/2025 0951 by Camacho Salinas RN  Outcome: Progressing  1/3/2025 0424 by Sarah Romero RN  Outcome: Progressing

## 2025-01-03 NOTE — PROGRESS NOTES
Hourly rounds performed this shift. Bed lowered and locked. Call light within reach. PRN pain and nausea medication given per MAR.

## 2025-01-05 LAB
BACTERIA SPEC CULT: NORMAL
BACTERIA SPEC CULT: NORMAL
SERVICE CMNT-IMP: NORMAL
SERVICE CMNT-IMP: NORMAL

## 2025-01-06 ENCOUNTER — TELEPHONE (OUTPATIENT)
Dept: INTERNAL MEDICINE CLINIC | Facility: CLINIC | Age: 60
End: 2025-01-06

## 2025-01-06 ENCOUNTER — TELEPHONE (OUTPATIENT)
Dept: GASTROENTEROLOGY | Age: 60
End: 2025-01-06

## 2025-01-06 NOTE — TELEPHONE ENCOUNTER
Spoke with patient regarding TCM care and PCP follow-up.  She is doing better since hospital discharge and is in the process of arranging follow-up appointments.  Reminded that GI was trying to reach her this morning and she should call them back.  She also plans to call her PCP for an early hospital postdischarge appointment.

## 2025-01-07 ENCOUNTER — TELEPHONE (OUTPATIENT)
Dept: GASTROENTEROLOGY | Age: 60
End: 2025-01-07

## 2025-01-07 DIAGNOSIS — K85.90 ACUTE PANCREATITIS, UNSPECIFIED COMPLICATION STATUS, UNSPECIFIED PANCREATITIS TYPE: Primary | ICD-10-CM

## 2025-01-07 RX ORDER — ONDANSETRON 4 MG/1
4 TABLET, ORALLY DISINTEGRATING ORAL EVERY 6 HOURS PRN
Qty: 30 TABLET | Refills: 0 | Status: SHIPPED | OUTPATIENT
Start: 2025-01-07

## 2025-01-07 NOTE — TELEPHONE ENCOUNTER
Called and lvm x2 to schedule repeat ERCP 6-8 weeks, mailed letter for patient to return call to schedule

## 2025-01-07 NOTE — TELEPHONE ENCOUNTER
Rc'd staff message that pt was having some nausea.  Explained to pt that Dr. Guzman does not routinely prescribe symptom medications, and asked if she was established with a primary care.  She has an appt in about a week.  Per Dr. Guzman, okay to send limited supply of Zofran to pharmacy of choice.

## 2025-01-08 ENCOUNTER — HOSPITAL ENCOUNTER (EMERGENCY)
Age: 60
Discharge: HOME | End: 2025-01-08
Payer: MEDICAID

## 2025-01-08 VITALS
HEART RATE: 91 BPM | SYSTOLIC BLOOD PRESSURE: 112 MMHG | BODY MASS INDEX: 24.44 KG/M2 | HEIGHT: 69 IN | WEIGHT: 165 LBS | OXYGEN SATURATION: 94 % | RESPIRATION RATE: 17 BRPM | TEMPERATURE: 98.8 F | DIASTOLIC BLOOD PRESSURE: 80 MMHG

## 2025-01-08 DIAGNOSIS — H10.9 CONJUNCTIVITIS OF LEFT EYE, UNSPECIFIED CONJUNCTIVITIS TYPE: Primary | ICD-10-CM

## 2025-01-08 PROCEDURE — 99283 EMERGENCY DEPT VISIT LOW MDM: CPT

## 2025-01-08 RX ORDER — TETRACAINE HYDROCHLORIDE 5 MG/ML
2 SOLUTION OPHTHALMIC
Status: DISCONTINUED | OUTPATIENT
Start: 2025-01-08 | End: 2025-01-08

## 2025-01-08 RX ORDER — OFLOXACIN 3 MG/ML
2 SOLUTION/ DROPS OPHTHALMIC 4 TIMES DAILY
Qty: 5 ML | Refills: 0 | Status: SHIPPED | OUTPATIENT
Start: 2025-01-08 | End: 2025-01-18

## 2025-01-08 ASSESSMENT — PAIN - FUNCTIONAL ASSESSMENT: PAIN_FUNCTIONAL_ASSESSMENT: 0-10

## 2025-01-08 ASSESSMENT — VISUAL ACUITY
OD: 20/50
OS: -
OU: 20/50

## 2025-01-08 ASSESSMENT — LIFESTYLE VARIABLES
HOW MANY STANDARD DRINKS CONTAINING ALCOHOL DO YOU HAVE ON A TYPICAL DAY: PATIENT DOES NOT DRINK
HOW OFTEN DO YOU HAVE A DRINK CONTAINING ALCOHOL: NEVER

## 2025-01-08 NOTE — ED NOTES
Patient mobility status  with no difficulty.     I have reviewed discharge instructions with the patient.  The patient verbalized understanding.    Patient left ED via Discharge Method: ambulatory to Home with  self .    Opportunity for questions and clarification provided.     Patient given 1 scripts.         
115

## 2025-01-08 NOTE — ED PROVIDER NOTES
dailyHistorical Med      furosemide (LASIX) 20 MG tablet Take 1 tablet by mouth dailyHistorical Med      fluticasone (FLONASE) 50 MCG/ACT nasal spray 1 spray by Each Nostril route dailyHistorical Med      insulin glargine (LANTUS) 100 UNIT/ML injection vial Inject 35 Units into the skin 2 times dailyHistorical Med      montelukast (SINGULAIR) 10 MG tablet Take 1 tablet by mouth nightlyHistorical Med      potassium chloride (KLOR-CON M) 10 MEQ extended release tablet Take 1 tablet by mouth dailyHistorical Med      RABEprazole (ACIPHEX) 20 MG tablet Take 1 tablet by mouth 2 times dailyHistorical Med      sildenafil (VIAGRA) 100 MG tablet Take 0.5 tablets by mouth as needed for Erectile DysfunctionHistorical Med      simvastatin (ZOCOR) 10 MG tablet Take 1 tablet by mouth nightlyHistorical Med      tamsulosin (FLOMAX) 0.4 MG capsule Take 1 capsule by mouth dailyHistorical Med      traZODone (DESYREL) 50 MG tablet Take 1 tablet by mouth nightly as needed for SleepHistorical Med      polyvinyl alcohol (LIQUIFILM TEARS) 1.4 % ophthalmic solution Place 1 drop into both eyes as neededHistorical Med      acetaminophen (TYLENOL) 325 MG tablet Take 2 tablets by mouth every 8 hours as needed for PainHistorical Med      ketorolac (ACULAR) 0.5 % ophthalmic solution 1 drop 4 times dailyHistorical Med      nystatin (MYCOSTATIN) 822007 UNIT/GM cream Apply topically daily as needed for Dry Skin Apply topically 2 times daily., Topical, DAILY PRN, Historical Med      budesonide-formoterol (SYMBICORT) 80-4.5 MCG/ACT AERO 2 puffsHistorical Med      cetirizine (ZYRTEC) 10 MG tablet Take 1 tablet by mouth daily as neededHistorical Med      clotrimazole (LOTRIMIN) 1 % cream Apply topically 2 times daily, Topical, 2 TIMES DAILY Starting Tue 9/10/2024, Historical Med      diclofenac sodium (VOLTAREN) 1 % GEL Apply 1 Application topically daily as needed, Topical, DAILY PRN, Historical Med      docusate sodium (COLACE) 100 MG capsule 1

## 2025-01-08 NOTE — ED TRIAGE NOTES
Pt arrives ambulatory in triage with complaint of left eye discomfort and light sensitivity since Sunday. Denies recent injury. Denies drainage, no redness noted

## 2025-01-19 ENCOUNTER — HOSPITAL ENCOUNTER (EMERGENCY)
Age: 60
Discharge: HOME | End: 2025-01-19
Attending: EMERGENCY MEDICINE
Payer: MEDICAID

## 2025-01-19 VITALS
BODY MASS INDEX: 32.58 KG/M2 | HEART RATE: 90 BPM | TEMPERATURE: 98.3 F | RESPIRATION RATE: 16 BRPM | OXYGEN SATURATION: 97 % | WEIGHT: 220 LBS | DIASTOLIC BLOOD PRESSURE: 72 MMHG | SYSTOLIC BLOOD PRESSURE: 119 MMHG | HEIGHT: 69 IN

## 2025-01-19 DIAGNOSIS — K80.50 BILIARY COLIC: Primary | ICD-10-CM

## 2025-01-19 LAB
ALBUMIN SERPL-MCNC: 3 G/DL (ref 3.5–5)
ALBUMIN/GLOB SERPL: 0.8 (ref 1–1.9)
ALP SERPL-CCNC: 134 U/L (ref 35–104)
ALT SERPL-CCNC: <5 U/L (ref 12–65)
ANION GAP SERPL CALC-SCNC: 9 MMOL/L (ref 7–16)
APPEARANCE UR: ABNORMAL
AST SERPL-CCNC: 15 U/L (ref 15–37)
BASOPHILS # BLD: 0.02 K/UL (ref 0–0.2)
BASOPHILS NFR BLD: 0.3 % (ref 0–2)
BILIRUB SERPL-MCNC: 1.5 MG/DL (ref 0–1.2)
BILIRUB UR QL: ABNORMAL
BUN SERPL-MCNC: 9 MG/DL (ref 6–23)
CALCIUM SERPL-MCNC: 9.1 MG/DL (ref 8.8–10.2)
CHLORIDE SERPL-SCNC: 100 MMOL/L (ref 98–107)
CO2 SERPL-SCNC: 27 MMOL/L (ref 20–29)
COLOR UR: ABNORMAL
CREAT SERPL-MCNC: 0.65 MG/DL (ref 0.8–1.3)
DIFFERENTIAL METHOD BLD: NORMAL
EOSINOPHIL # BLD: 0.17 K/UL (ref 0–0.8)
EOSINOPHIL NFR BLD: 2.3 % (ref 0.5–7.8)
ERYTHROCYTE [DISTWIDTH] IN BLOOD BY AUTOMATED COUNT: 13.2 % (ref 11.9–14.6)
GLOBULIN SER CALC-MCNC: 4 G/DL (ref 2.3–3.5)
GLUCOSE SERPL-MCNC: 158 MG/DL (ref 65–100)
GLUCOSE UR STRIP.AUTO-MCNC: NEGATIVE MG/DL
HCT VFR BLD AUTO: 38.7 % (ref 35.8–46.3)
HGB BLD-MCNC: 13.4 G/DL (ref 11.7–15.4)
HGB UR QL STRIP: NEGATIVE
IMM GRANULOCYTES # BLD AUTO: 0.01 K/UL (ref 0–0.5)
IMM GRANULOCYTES NFR BLD AUTO: 0.1 % (ref 0–5)
KETONES UR QL STRIP.AUTO: ABNORMAL MG/DL
LEUKOCYTE ESTERASE UR QL STRIP.AUTO: NEGATIVE
LIPASE SERPL-CCNC: 293 U/L (ref 13–60)
LYMPHOCYTES # BLD: 1.65 K/UL (ref 0.5–4.6)
LYMPHOCYTES NFR BLD: 22.5 % (ref 13–44)
MCH RBC QN AUTO: 31.1 PG (ref 26.1–32.9)
MCHC RBC AUTO-ENTMCNC: 34.6 G/DL (ref 31.4–35)
MCV RBC AUTO: 89.8 FL (ref 82–102)
MONOCYTES # BLD: 0.77 K/UL (ref 0.1–1.3)
MONOCYTES NFR BLD: 10.5 % (ref 4–12)
NEUTS SEG # BLD: 4.72 K/UL (ref 1.7–8.2)
NEUTS SEG NFR BLD: 64.3 % (ref 43–78)
NITRITE UR QL STRIP.AUTO: NEGATIVE
NRBC # BLD: 0 K/UL (ref 0–0.2)
PH UR STRIP: 5.5 (ref 5–9)
PLATELET # BLD AUTO: 222 K/UL (ref 150–450)
PMV BLD AUTO: 9.6 FL (ref 9.4–12.3)
POTASSIUM SERPL-SCNC: 4.1 MMOL/L (ref 3.5–5.1)
PROT SERPL-MCNC: 7 G/DL (ref 6.3–8.2)
PROT UR STRIP-MCNC: NEGATIVE MG/DL
RBC # BLD AUTO: 4.31 M/UL (ref 4.05–5.2)
SODIUM SERPL-SCNC: 136 MMOL/L (ref 133–143)
SP GR UR REFRACTOMETRY: 1.02 (ref 1–1.02)
UROBILINOGEN UR QL STRIP.AUTO: >=8 EU/DL (ref 0.2–1)
WBC # BLD AUTO: 7.3 K/UL (ref 4.3–11.1)

## 2025-01-19 PROCEDURE — 99284 EMERGENCY DEPT VISIT MOD MDM: CPT

## 2025-01-19 PROCEDURE — 6360000002 HC RX W HCPCS: Performed by: EMERGENCY MEDICINE

## 2025-01-19 PROCEDURE — 96375 TX/PRO/DX INJ NEW DRUG ADDON: CPT

## 2025-01-19 PROCEDURE — 83690 ASSAY OF LIPASE: CPT

## 2025-01-19 PROCEDURE — 96374 THER/PROPH/DIAG INJ IV PUSH: CPT

## 2025-01-19 PROCEDURE — 2580000003 HC RX 258: Performed by: EMERGENCY MEDICINE

## 2025-01-19 PROCEDURE — 80053 COMPREHEN METABOLIC PANEL: CPT

## 2025-01-19 PROCEDURE — 85025 COMPLETE CBC W/AUTO DIFF WBC: CPT

## 2025-01-19 PROCEDURE — 81003 URINALYSIS AUTO W/O SCOPE: CPT

## 2025-01-19 RX ORDER — ONDANSETRON 2 MG/ML
4 INJECTION INTRAMUSCULAR; INTRAVENOUS
Status: COMPLETED | OUTPATIENT
Start: 2025-01-19 | End: 2025-01-19

## 2025-01-19 RX ORDER — 0.9 % SODIUM CHLORIDE 0.9 %
1000 INTRAVENOUS SOLUTION INTRAVENOUS
Status: COMPLETED | OUTPATIENT
Start: 2025-01-19 | End: 2025-01-19

## 2025-01-19 RX ORDER — OXYCODONE HYDROCHLORIDE 5 MG/1
5-10 TABLET ORAL EVERY 4 HOURS PRN
Qty: 19 TABLET | Refills: 0 | Status: SHIPPED | OUTPATIENT
Start: 2025-01-19 | End: 2025-01-22

## 2025-01-19 RX ORDER — MORPHINE SULFATE 4 MG/ML
4 INJECTION INTRAVENOUS ONCE
Status: COMPLETED | OUTPATIENT
Start: 2025-01-19 | End: 2025-01-19

## 2025-01-19 RX ORDER — ONDANSETRON 4 MG/1
4 TABLET, ORALLY DISINTEGRATING ORAL 4 TIMES DAILY PRN
Qty: 21 TABLET | Refills: 0 | Status: SHIPPED | OUTPATIENT
Start: 2025-01-19

## 2025-01-19 RX ADMIN — SODIUM CHLORIDE 1000 ML: 9 INJECTION, SOLUTION INTRAVENOUS at 16:26

## 2025-01-19 RX ADMIN — ONDANSETRON 4 MG: 2 INJECTION, SOLUTION INTRAMUSCULAR; INTRAVENOUS at 16:27

## 2025-01-19 RX ADMIN — MORPHINE SULFATE 4 MG: 4 INJECTION INTRAVENOUS at 16:27

## 2025-01-19 ASSESSMENT — PAIN DESCRIPTION - LOCATION
LOCATION: ABDOMEN
LOCATION: ABDOMEN

## 2025-01-19 ASSESSMENT — PAIN SCALES - GENERAL
PAINLEVEL_OUTOF10: 10
PAINLEVEL_OUTOF10: 10
PAINLEVEL_OUTOF10: 7

## 2025-01-19 ASSESSMENT — PAIN - FUNCTIONAL ASSESSMENT
PAIN_FUNCTIONAL_ASSESSMENT: 0-10
PAIN_FUNCTIONAL_ASSESSMENT: 0-10

## 2025-01-19 NOTE — ED PROVIDER NOTES
Emergency Department Provider Note       PCP: Unknown, Provider, ANP   Age: 59 y.o.   Sex: female     DISPOSITION Decision To Discharge 01/19/2025 05:32:34 PM    ICD-10-CM    1. Biliary colic  K80.50 oxyCODONE (ROXICODONE) 5 MG immediate release tablet          Medical Decision Making     59-year-old patient here with recurrent episodes of right upper quadrant abdominal pain  Recently admitted secondary to cholecystitis and biliary pancreatitis  Symptoms in the right upper quadrant returned about a day ago  No overt fever  Lipase continues to trend downward  Total bili up a little bit but not unremarkable degree  Otherwise patient is comfortable  Feels better with medications  Patient will be discharged to follow-up outpatient with her surgeons and GI group  Return precautions discussed     1 or more chronic illnesses with a severe exacerbation or progression.  1 acute illness with systemic symptoms.  Prescription drug management performed.  Parental controlled substances given in the ED.  Patient was discharged risks and benefits of hospitalization were considered.  Shared medical decision making was utilized in creating the patients health plan today.  I independently ordered and reviewed each unique test.    I reviewed external records: ED visit note from a different ED.   I reviewed external records: provider visit note from PCP.  I reviewed external records: provider visit note from outside specialist.                     History     59-year-old female patient presents to the ER with complaints of recurrent episodes of right upper quadrant abdominal pain  Recently hospitalized with cholecystitis and pancreatitis  Discharged after a course of antibiotics and having had ERCP with stent placement  Was referred for outpatient cholecystectomy and also need for repeat endoscopy removal  She has had no fevers  Nausea but no vomiting or diarrhea  Pain mostly in her right upper quadrant  No urinary symptoms    The

## 2025-01-19 NOTE — DISCHARGE INSTRUCTIONS
Take medications as directed  Drink plenty of fluids  Recheck with your primary care doctor, surgeon and GI specialist as soon as possible    Stay on a low-fat, bland diet    Do not drink alcohol or drive while taking the prescription pain medications    Return to ER for any worsening symptoms or new problems which may arise

## 2025-01-21 ENCOUNTER — CLINICAL DOCUMENTATION (OUTPATIENT)
Dept: SURGERY | Age: 60
End: 2025-01-21

## 2025-01-21 NOTE — PROGRESS NOTES
Was informed by Dr Anaya that [facility] does not take patient's health insurance and she was referred to [facility].  I called [facility] [phone] and was informed that her information was emailed within [facility] to the Surgery Dept [phone] ( [place]).  This information was provided to the patient with contact numbers so that she can contact them for an appointment should she not hear back in a timely fashion.      BHARAT MENA, APRN - CNP

## 2025-02-07 ENCOUNTER — TELEPHONE (OUTPATIENT)
Dept: GASTROENTEROLOGY | Age: 60
End: 2025-02-07

## 2025-02-07 NOTE — TELEPHONE ENCOUNTER
Called pt RE:  scheduling repeat ERCP with stent removal/exchange 6-8 weeks after his last procedure, done 12/31 with Dr. Guzman.  There was no answer.  LVM for them to RTC to get scheduled.

## 2025-02-17 PROBLEM — K80.50 COMMON BILE DUCT STONE: Status: ACTIVE | Noted: 2025-02-17

## 2025-02-19 ENCOUNTER — TELEPHONE (OUTPATIENT)
Age: 60
End: 2025-02-19

## 2025-02-19 ENCOUNTER — HOSPITAL ENCOUNTER (EMERGENCY)
Age: 60
Discharge: ANOTHER HOSPITAL | DRG: 871 | End: 2025-02-19
Payer: COMMERCIAL

## 2025-02-19 ENCOUNTER — APPOINTMENT (OUTPATIENT)
Dept: CT IMAGING | Age: 60
DRG: 871 | End: 2025-02-19
Payer: COMMERCIAL

## 2025-02-19 ENCOUNTER — HOSPITAL ENCOUNTER (INPATIENT)
Age: 60
LOS: 7 days | Discharge: HOME | DRG: 871 | End: 2025-02-26
Attending: INTERNAL MEDICINE | Admitting: STUDENT IN AN ORGANIZED HEALTH CARE EDUCATION/TRAINING PROGRAM
Payer: COMMERCIAL

## 2025-02-19 VITALS
RESPIRATION RATE: 18 BRPM | HEART RATE: 98 BPM | DIASTOLIC BLOOD PRESSURE: 56 MMHG | TEMPERATURE: 97.9 F | SYSTOLIC BLOOD PRESSURE: 113 MMHG | HEIGHT: 69 IN | WEIGHT: 222.4 LBS | BODY MASS INDEX: 32.94 KG/M2 | OXYGEN SATURATION: 100 %

## 2025-02-19 DIAGNOSIS — R11.14 BILIOUS VOMITING WITH NAUSEA: ICD-10-CM

## 2025-02-19 DIAGNOSIS — K75.0 HEPATIC ABSCESS: Primary | ICD-10-CM

## 2025-02-19 DIAGNOSIS — K81.0 ACUTE CHOLECYSTITIS: Primary | ICD-10-CM

## 2025-02-19 DIAGNOSIS — K75.0 LIVER ABSCESS: ICD-10-CM

## 2025-02-19 DIAGNOSIS — K81.9 CHOLECYSTITIS: ICD-10-CM

## 2025-02-19 DIAGNOSIS — R10.9 INTRACTABLE ABDOMINAL PAIN: ICD-10-CM

## 2025-02-19 PROBLEM — A41.9 SEPSIS (HCC): Status: ACTIVE | Noted: 2025-02-19

## 2025-02-19 PROBLEM — E66.811 CLASS 1 OBESITY: Status: ACTIVE | Noted: 2025-02-19

## 2025-02-19 PROBLEM — Z86.711 HISTORY OF PULMONARY EMBOLUS (PE): Status: ACTIVE | Noted: 2025-02-19

## 2025-02-19 PROBLEM — K80.20 CHOLELITHIASIS: Status: ACTIVE | Noted: 2025-02-19

## 2025-02-19 PROBLEM — E11.42 DIABETIC POLYNEUROPATHY (HCC): Status: ACTIVE | Noted: 2025-02-19

## 2025-02-19 PROBLEM — J45.909 ASTHMA: Status: ACTIVE | Noted: 2025-02-19

## 2025-02-19 PROBLEM — R33.9 URINARY RETENTION: Status: ACTIVE | Noted: 2025-02-19

## 2025-02-19 LAB
ALBUMIN SERPL-MCNC: 2.8 G/DL (ref 3.5–5)
ALBUMIN/GLOB SERPL: 0.7 (ref 1–1.9)
ALP SERPL-CCNC: 170 U/L (ref 35–104)
ALT SERPL-CCNC: <5 U/L (ref 12–65)
ANION GAP SERPL CALC-SCNC: 10 MMOL/L (ref 7–16)
APPEARANCE UR: CLEAR
AST SERPL-CCNC: 14 U/L (ref 15–37)
BACTERIA URNS QL MICRO: ABNORMAL /HPF
BASOPHILS # BLD: 0.01 K/UL (ref 0–0.2)
BASOPHILS NFR BLD: 0.1 % (ref 0–2)
BILIRUB SERPL-MCNC: 2.2 MG/DL (ref 0–1.2)
BILIRUB UR QL: ABNORMAL
BUN SERPL-MCNC: 13 MG/DL (ref 6–23)
CALCIUM SERPL-MCNC: 8.6 MG/DL (ref 8.8–10.2)
CHLORIDE SERPL-SCNC: 99 MMOL/L (ref 98–107)
CO2 SERPL-SCNC: 29 MMOL/L (ref 20–29)
COLOR UR: ABNORMAL
CREAT SERPL-MCNC: 0.62 MG/DL (ref 0.8–1.3)
DIFFERENTIAL METHOD BLD: ABNORMAL
EKG ATRIAL RATE: 88 BPM
EKG DIAGNOSIS: NORMAL
EKG P AXIS: 67 DEGREES
EKG P-R INTERVAL: 173 MS
EKG Q-T INTERVAL: 376 MS
EKG QRS DURATION: 85 MS
EKG QTC CALCULATION (BAZETT): 468 MS
EKG R AXIS: 62 DEGREES
EKG T AXIS: 54 DEGREES
EKG VENTRICULAR RATE: 93 BPM
EOSINOPHIL # BLD: 0.01 K/UL (ref 0–0.8)
EOSINOPHIL NFR BLD: 0.1 % (ref 0.5–7.8)
EPI CELLS #/AREA URNS HPF: ABNORMAL /HPF
ERYTHROCYTE [DISTWIDTH] IN BLOOD BY AUTOMATED COUNT: 13.3 % (ref 11.9–14.6)
FLUAV RNA SPEC QL NAA+PROBE: NOT DETECTED
FLUBV RNA SPEC QL NAA+PROBE: NOT DETECTED
GLOBULIN SER CALC-MCNC: 4 G/DL (ref 2.3–3.5)
GLUCOSE BLD STRIP.AUTO-MCNC: 118 MG/DL (ref 65–100)
GLUCOSE SERPL-MCNC: 138 MG/DL (ref 65–100)
GLUCOSE UR STRIP.AUTO-MCNC: 100 MG/DL
HCT VFR BLD AUTO: 39.7 % (ref 35.8–46.3)
HGB BLD-MCNC: 13.7 G/DL (ref 11.7–15.4)
HGB UR QL STRIP: ABNORMAL
IMM GRANULOCYTES # BLD AUTO: 0.06 K/UL (ref 0–0.5)
IMM GRANULOCYTES NFR BLD AUTO: 0.5 % (ref 0–5)
KETONES UR QL STRIP.AUTO: ABNORMAL MG/DL
LEUKOCYTE ESTERASE UR QL STRIP.AUTO: NEGATIVE
LIPASE SERPL-CCNC: 12 U/L (ref 13–60)
LYMPHOCYTES # BLD: 0.51 K/UL (ref 0.5–4.6)
LYMPHOCYTES NFR BLD: 4.3 % (ref 13–44)
MCH RBC QN AUTO: 31.1 PG (ref 26.1–32.9)
MCHC RBC AUTO-ENTMCNC: 34.5 G/DL (ref 31.4–35)
MCV RBC AUTO: 90.2 FL (ref 82–102)
MONOCYTES # BLD: 1.03 K/UL (ref 0.1–1.3)
MONOCYTES NFR BLD: 8.6 % (ref 4–12)
MUCOUS THREADS URNS QL MICRO: 0 /LPF
NEUTS SEG # BLD: 10.36 K/UL (ref 1.7–8.2)
NEUTS SEG NFR BLD: 86.4 % (ref 43–78)
NITRITE UR QL STRIP.AUTO: NEGATIVE
NRBC # BLD: 0 K/UL (ref 0–0.2)
OTHER OBSERVATIONS: ABNORMAL
PH UR STRIP: 8.5 (ref 5–9)
PLATELET # BLD AUTO: 183 K/UL (ref 150–450)
PMV BLD AUTO: 9.4 FL (ref 9.4–12.3)
POTASSIUM SERPL-SCNC: 3.7 MMOL/L (ref 3.5–5.1)
PROT SERPL-MCNC: 6.8 G/DL (ref 6.3–8.2)
PROT UR STRIP-MCNC: 30 MG/DL
RBC # BLD AUTO: 4.4 M/UL (ref 4.05–5.2)
RBC #/AREA URNS HPF: ABNORMAL /HPF
SARS-COV-2 RDRP RESP QL NAA+PROBE: NOT DETECTED
SERVICE CMNT-IMP: ABNORMAL
SODIUM SERPL-SCNC: 138 MMOL/L (ref 133–143)
SOURCE: NORMAL
SP GR UR REFRACTOMETRY: 1.01 (ref 1–1.02)
TROPONIN T SERPL HS-MCNC: 10.2 NG/L (ref 0–14)
TROPONIN T SERPL HS-MCNC: 9.2 NG/L (ref 0–14)
UROBILINOGEN UR QL STRIP.AUTO: >=8 EU/DL (ref 0.2–1)
WBC # BLD AUTO: 12 K/UL (ref 4.3–11.1)
WBC URNS QL MICRO: ABNORMAL /HPF

## 2025-02-19 PROCEDURE — 83690 ASSAY OF LIPASE: CPT

## 2025-02-19 PROCEDURE — 81001 URINALYSIS AUTO W/SCOPE: CPT

## 2025-02-19 PROCEDURE — 2580000003 HC RX 258: Performed by: STUDENT IN AN ORGANIZED HEALTH CARE EDUCATION/TRAINING PROGRAM

## 2025-02-19 PROCEDURE — 93010 ELECTROCARDIOGRAM REPORT: CPT | Performed by: INTERNAL MEDICINE

## 2025-02-19 PROCEDURE — 6370000000 HC RX 637 (ALT 250 FOR IP): Performed by: STUDENT IN AN ORGANIZED HEALTH CARE EDUCATION/TRAINING PROGRAM

## 2025-02-19 PROCEDURE — 85025 COMPLETE CBC W/AUTO DIFF WBC: CPT

## 2025-02-19 PROCEDURE — 82962 GLUCOSE BLOOD TEST: CPT

## 2025-02-19 PROCEDURE — 74177 CT ABD & PELVIS W/CONTRAST: CPT

## 2025-02-19 PROCEDURE — 6360000004 HC RX CONTRAST MEDICATION: Performed by: NURSE PRACTITIONER

## 2025-02-19 PROCEDURE — 6360000002 HC RX W HCPCS: Performed by: NURSE PRACTITIONER

## 2025-02-19 PROCEDURE — 87502 INFLUENZA DNA AMP PROBE: CPT

## 2025-02-19 PROCEDURE — 6360000002 HC RX W HCPCS: Performed by: STUDENT IN AN ORGANIZED HEALTH CARE EDUCATION/TRAINING PROGRAM

## 2025-02-19 PROCEDURE — 2500000003 HC RX 250 WO HCPCS: Performed by: STUDENT IN AN ORGANIZED HEALTH CARE EDUCATION/TRAINING PROGRAM

## 2025-02-19 PROCEDURE — 1100000000 HC RM PRIVATE

## 2025-02-19 PROCEDURE — 2580000003 HC RX 258: Performed by: NURSE PRACTITIONER

## 2025-02-19 PROCEDURE — 84484 ASSAY OF TROPONIN QUANT: CPT

## 2025-02-19 PROCEDURE — 80053 COMPREHEN METABOLIC PANEL: CPT

## 2025-02-19 PROCEDURE — 87635 SARS-COV-2 COVID-19 AMP PRB: CPT

## 2025-02-19 PROCEDURE — 93005 ELECTROCARDIOGRAM TRACING: CPT | Performed by: NURSE PRACTITIONER

## 2025-02-19 RX ORDER — POLYETHYLENE GLYCOL 3350 17 G/17G
17 POWDER, FOR SOLUTION ORAL DAILY PRN
Status: DISCONTINUED | OUTPATIENT
Start: 2025-02-19 | End: 2025-02-26 | Stop reason: HOSPADM

## 2025-02-19 RX ORDER — ONDANSETRON 4 MG/1
8 TABLET, ORALLY DISINTEGRATING ORAL EVERY 8 HOURS PRN
Status: DISCONTINUED | OUTPATIENT
Start: 2025-02-19 | End: 2025-02-26 | Stop reason: HOSPADM

## 2025-02-19 RX ORDER — 0.9 % SODIUM CHLORIDE 0.9 %
1000 INTRAVENOUS SOLUTION INTRAVENOUS ONCE
Status: COMPLETED | OUTPATIENT
Start: 2025-02-19 | End: 2025-02-19

## 2025-02-19 RX ORDER — IOPAMIDOL 755 MG/ML
100 INJECTION, SOLUTION INTRAVASCULAR
Status: COMPLETED | OUTPATIENT
Start: 2025-02-19 | End: 2025-02-19

## 2025-02-19 RX ORDER — HYDROMORPHONE HYDROCHLORIDE 1 MG/ML
0.5 INJECTION, SOLUTION INTRAMUSCULAR; INTRAVENOUS; SUBCUTANEOUS EVERY 4 HOURS PRN
Status: DISCONTINUED | OUTPATIENT
Start: 2025-02-19 | End: 2025-02-26 | Stop reason: HOSPADM

## 2025-02-19 RX ORDER — POTASSIUM CHLORIDE 1500 MG/1
40 TABLET, EXTENDED RELEASE ORAL PRN
Status: DISCONTINUED | OUTPATIENT
Start: 2025-02-19 | End: 2025-02-26 | Stop reason: HOSPADM

## 2025-02-19 RX ORDER — MONTELUKAST SODIUM 10 MG/1
10 TABLET ORAL NIGHTLY
Status: DISCONTINUED | OUTPATIENT
Start: 2025-02-19 | End: 2025-02-26 | Stop reason: HOSPADM

## 2025-02-19 RX ORDER — ONDANSETRON 2 MG/ML
4 INJECTION INTRAMUSCULAR; INTRAVENOUS ONCE
Status: COMPLETED | OUTPATIENT
Start: 2025-02-19 | End: 2025-02-19

## 2025-02-19 RX ORDER — INSULIN GLARGINE 100 [IU]/ML
35 INJECTION, SOLUTION SUBCUTANEOUS 2 TIMES DAILY
Status: DISCONTINUED | OUTPATIENT
Start: 2025-02-19 | End: 2025-02-26 | Stop reason: HOSPADM

## 2025-02-19 RX ORDER — IBUPROFEN 600 MG/1
1 TABLET ORAL PRN
Status: DISCONTINUED | OUTPATIENT
Start: 2025-02-19 | End: 2025-02-26 | Stop reason: HOSPADM

## 2025-02-19 RX ORDER — FLUOCINOLONE ACETONIDE, HYDROQUINONE, AND TRETINOIN .1; 40; .5 MG/G; MG/G; MG/G
1 CREAM TOPICAL DAILY
COMMUNITY

## 2025-02-19 RX ORDER — MAGNESIUM SULFATE IN WATER 40 MG/ML
2000 INJECTION, SOLUTION INTRAVENOUS PRN
Status: DISCONTINUED | OUTPATIENT
Start: 2025-02-19 | End: 2025-02-26 | Stop reason: HOSPADM

## 2025-02-19 RX ORDER — POTASSIUM CHLORIDE 7.45 MG/ML
10 INJECTION INTRAVENOUS PRN
Status: DISCONTINUED | OUTPATIENT
Start: 2025-02-19 | End: 2025-02-26 | Stop reason: HOSPADM

## 2025-02-19 RX ORDER — ONDANSETRON 2 MG/ML
8 INJECTION INTRAMUSCULAR; INTRAVENOUS EVERY 6 HOURS PRN
Status: DISCONTINUED | OUTPATIENT
Start: 2025-02-19 | End: 2025-02-26 | Stop reason: HOSPADM

## 2025-02-19 RX ORDER — SODIUM CHLORIDE 9 MG/ML
INJECTION, SOLUTION INTRAVENOUS PRN
Status: DISCONTINUED | OUTPATIENT
Start: 2025-02-19 | End: 2025-02-26 | Stop reason: HOSPADM

## 2025-02-19 RX ORDER — SODIUM CHLORIDE, SODIUM LACTATE, POTASSIUM CHLORIDE, AND CALCIUM CHLORIDE .6; .31; .03; .02 G/100ML; G/100ML; G/100ML; G/100ML
1000 INJECTION, SOLUTION INTRAVENOUS ONCE
Status: COMPLETED | OUTPATIENT
Start: 2025-02-19 | End: 2025-02-20

## 2025-02-19 RX ORDER — FUROSEMIDE 20 MG/1
20 TABLET ORAL DAILY
Status: DISCONTINUED | OUTPATIENT
Start: 2025-02-20 | End: 2025-02-26 | Stop reason: HOSPADM

## 2025-02-19 RX ORDER — SODIUM CHLORIDE 0.9 % (FLUSH) 0.9 %
5-40 SYRINGE (ML) INJECTION EVERY 12 HOURS SCHEDULED
Status: DISCONTINUED | OUTPATIENT
Start: 2025-02-19 | End: 2025-02-26 | Stop reason: HOSPADM

## 2025-02-19 RX ORDER — DULOXETIN HYDROCHLORIDE 60 MG/1
60 CAPSULE, DELAYED RELEASE ORAL DAILY
Status: DISCONTINUED | OUTPATIENT
Start: 2025-02-20 | End: 2025-02-26 | Stop reason: HOSPADM

## 2025-02-19 RX ORDER — DEXTROSE MONOHYDRATE 100 MG/ML
INJECTION, SOLUTION INTRAVENOUS CONTINUOUS PRN
Status: DISCONTINUED | OUTPATIENT
Start: 2025-02-19 | End: 2025-02-26 | Stop reason: HOSPADM

## 2025-02-19 RX ORDER — ACETAMINOPHEN 650 MG/1
650 SUPPOSITORY RECTAL EVERY 6 HOURS PRN
Status: DISCONTINUED | OUTPATIENT
Start: 2025-02-19 | End: 2025-02-26 | Stop reason: HOSPADM

## 2025-02-19 RX ORDER — 0.9 % SODIUM CHLORIDE 0.9 %
1000 INTRAVENOUS SOLUTION INTRAVENOUS ONCE
Status: DISCONTINUED | OUTPATIENT
Start: 2025-02-19 | End: 2025-02-19

## 2025-02-19 RX ORDER — ATORVASTATIN CALCIUM 10 MG/1
10 TABLET, FILM COATED ORAL NIGHTLY
Status: DISCONTINUED | OUTPATIENT
Start: 2025-02-19 | End: 2025-02-26 | Stop reason: HOSPADM

## 2025-02-19 RX ORDER — TAMSULOSIN HYDROCHLORIDE 0.4 MG/1
0.4 CAPSULE ORAL DAILY
Status: DISCONTINUED | OUTPATIENT
Start: 2025-02-20 | End: 2025-02-26 | Stop reason: HOSPADM

## 2025-02-19 RX ORDER — OXYCODONE HYDROCHLORIDE 30 MG/1
30 TABLET ORAL
COMMUNITY

## 2025-02-19 RX ORDER — GABAPENTIN 400 MG/1
800 CAPSULE ORAL 3 TIMES DAILY
Status: DISCONTINUED | OUTPATIENT
Start: 2025-02-19 | End: 2025-02-26 | Stop reason: HOSPADM

## 2025-02-19 RX ORDER — ONDANSETRON 2 MG/ML
4 INJECTION INTRAMUSCULAR; INTRAVENOUS
Status: COMPLETED | OUTPATIENT
Start: 2025-02-19 | End: 2025-02-19

## 2025-02-19 RX ORDER — INSULIN LISPRO 100 [IU]/ML
0-4 INJECTION, SOLUTION INTRAVENOUS; SUBCUTANEOUS
Status: DISCONTINUED | OUTPATIENT
Start: 2025-02-19 | End: 2025-02-26 | Stop reason: HOSPADM

## 2025-02-19 RX ORDER — SODIUM CHLORIDE 0.9 % (FLUSH) 0.9 %
5-40 SYRINGE (ML) INJECTION PRN
Status: DISCONTINUED | OUTPATIENT
Start: 2025-02-19 | End: 2025-02-26 | Stop reason: HOSPADM

## 2025-02-19 RX ORDER — HYDRALAZINE HYDROCHLORIDE 20 MG/ML
10 INJECTION INTRAMUSCULAR; INTRAVENOUS EVERY 6 HOURS PRN
Status: DISCONTINUED | OUTPATIENT
Start: 2025-02-19 | End: 2025-02-26 | Stop reason: HOSPADM

## 2025-02-19 RX ORDER — ACETAMINOPHEN 325 MG/1
650 TABLET ORAL EVERY 6 HOURS PRN
Status: DISCONTINUED | OUTPATIENT
Start: 2025-02-19 | End: 2025-02-26 | Stop reason: HOSPADM

## 2025-02-19 RX ORDER — METOCLOPRAMIDE HYDROCHLORIDE 5 MG/ML
10 INJECTION INTRAMUSCULAR; INTRAVENOUS EVERY 6 HOURS PRN
Status: DISCONTINUED | OUTPATIENT
Start: 2025-02-19 | End: 2025-02-26 | Stop reason: HOSPADM

## 2025-02-19 RX ADMIN — ONDANSETRON 4 MG: 2 INJECTION, SOLUTION INTRAMUSCULAR; INTRAVENOUS at 15:34

## 2025-02-19 RX ADMIN — FENTANYL CITRATE 25 MCG: 50 INJECTION INTRAMUSCULAR; INTRAVENOUS at 15:35

## 2025-02-19 RX ADMIN — ONDANSETRON 4 MG: 2 INJECTION, SOLUTION INTRAMUSCULAR; INTRAVENOUS at 13:13

## 2025-02-19 RX ADMIN — HYDROMORPHONE HYDROCHLORIDE 0.5 MG: 1 INJECTION, SOLUTION INTRAMUSCULAR; INTRAVENOUS; SUBCUTANEOUS at 20:03

## 2025-02-19 RX ADMIN — FENTANYL CITRATE 25 MCG: 50 INJECTION INTRAMUSCULAR; INTRAVENOUS at 13:13

## 2025-02-19 RX ADMIN — PIPERACILLIN AND TAZOBACTAM 3375 MG: 3; .375 INJECTION, POWDER, LYOPHILIZED, FOR SOLUTION INTRAVENOUS at 23:58

## 2025-02-19 RX ADMIN — MONTELUKAST 10 MG: 10 TABLET, FILM COATED ORAL at 23:57

## 2025-02-19 RX ADMIN — GABAPENTIN 800 MG: 400 CAPSULE ORAL at 23:56

## 2025-02-19 RX ADMIN — SODIUM CHLORIDE 1000 ML: 0.9 INJECTION, SOLUTION INTRAVENOUS at 13:12

## 2025-02-19 RX ADMIN — ONDANSETRON 8 MG: 2 INJECTION, SOLUTION INTRAMUSCULAR; INTRAVENOUS at 20:04

## 2025-02-19 RX ADMIN — PIPERACILLIN AND TAZOBACTAM 4500 MG: 4; .5 INJECTION, POWDER, LYOPHILIZED, FOR SOLUTION INTRAVENOUS at 15:38

## 2025-02-19 RX ADMIN — ATORVASTATIN CALCIUM 10 MG: 10 TABLET, FILM COATED ORAL at 23:57

## 2025-02-19 RX ADMIN — IOPAMIDOL 100 ML: 755 INJECTION, SOLUTION INTRAVENOUS at 13:54

## 2025-02-19 ASSESSMENT — PAIN DESCRIPTION - LOCATION
LOCATION: ABDOMEN

## 2025-02-19 ASSESSMENT — PAIN - FUNCTIONAL ASSESSMENT
PAIN_FUNCTIONAL_ASSESSMENT: ACTIVITIES ARE NOT PREVENTED
PAIN_FUNCTIONAL_ASSESSMENT: 0-10
PAIN_FUNCTIONAL_ASSESSMENT: PREVENTS OR INTERFERES SOME ACTIVE ACTIVITIES AND ADLS

## 2025-02-19 ASSESSMENT — PAIN DESCRIPTION - ONSET: ONSET: ON-GOING

## 2025-02-19 ASSESSMENT — PAIN SCALES - GENERAL
PAINLEVEL_OUTOF10: 8
PAINLEVEL_OUTOF10: 6
PAINLEVEL_OUTOF10: 7
PAINLEVEL_OUTOF10: 10
PAINLEVEL_OUTOF10: 10
PAINLEVEL_OUTOF10: 8
PAINLEVEL_OUTOF10: 5

## 2025-02-19 ASSESSMENT — PAIN DESCRIPTION - ORIENTATION
ORIENTATION: LEFT;MID
ORIENTATION: LEFT;UPPER
ORIENTATION: RIGHT;UPPER

## 2025-02-19 ASSESSMENT — LIFESTYLE VARIABLES: HOW MANY STANDARD DRINKS CONTAINING ALCOHOL DO YOU HAVE ON A TYPICAL DAY: PATIENT DOES NOT DRINK

## 2025-02-19 ASSESSMENT — PAIN DESCRIPTION - PAIN TYPE
TYPE: ACUTE PAIN
TYPE: ACUTE PAIN

## 2025-02-19 ASSESSMENT — PAIN DESCRIPTION - DESCRIPTORS
DESCRIPTORS: SHARP;SHOOTING
DESCRIPTORS: CRAMPING;CRUSHING

## 2025-02-19 ASSESSMENT — PAIN DESCRIPTION - FREQUENCY: FREQUENCY: CONTINUOUS

## 2025-02-19 NOTE — ED NOTES
TRANSFER - OUT REPORT:    Verbal report given to Alicia PETERSON on Miguel A Zambrano  being transferred to [facility] Room 217 for routine progression of patient care       Report consisted of patient's Situation, Background, Assessment and   Recommendations(SBAR).     Information from the following report(s) ED SBAR was reviewed with the receiving nurse.    Lines:   Peripheral IV 02/19/25 Right Antecubital (Active)   Site Assessment Clean, dry & intact 02/19/25 1240   Line Status Blood return noted;Flushed;No blood return 02/19/25 1240   Phlebitis Assessment No symptoms 02/19/25 1240   Infiltration Assessment 0 02/19/25 1240   Alcohol Cap Used No 02/19/25 1240   Dressing Type Transparent 02/19/25 1240        Opportunity for questions and clarification was provided.      Patient transported with:  [employer]

## 2025-02-19 NOTE — ED TRIAGE NOTES
Pt c/o abd pain and vomiting for 3 days.  States that she has a bad gall bladder and needs to have it removed

## 2025-02-19 NOTE — TELEPHONE ENCOUNTER
Pt LVM on office voice mail stating that she has been vomiting \"non stop\" and says her galbladder is \"messed up\" and wanted a call back.    RTC to patient.  She stated she has been unable to keep anything down since approx Monday.  She was instructed that Dr. Guzman is out of town this week and that she should report to the ER for uncontrollable symptoms, as we could not provide assistance in the office for that condition whether he was here or not.  Pt was agreeable and will go to ER for work up, eval, and possible admit.

## 2025-02-19 NOTE — ED PROVIDER NOTES
Emergency Department Provider Note       PCP: Unknown, Provider, ANP   Age: 59 y.o.   Sex: female     DISPOSITION Decision To Transfer 02/19/2025 03:55:29 PM    ICD-10-CM    1. Acute cholecystitis  K81.0       2. Intractable abdominal pain  R10.9       3. Bilious vomiting with nausea  R11.14           Medical Decision Making     59-year-old white female with a past medical history of biliary colic, gallbladder disease, pancreatitis, recurrent episodes of right upper quadrant midepigastric abdominal pain, presents emergency room with a chief complaint of right upper quadrant abdominal pain, midepigastric pain, and intractable nausea vomiting since Monday.  States unable to hold down any food or fluids.  She was recently hospitalized with cholecystitis and pancreatitis discharged after course of antibiotics and having had an ERCP with stent placement.  She was referred for outpatient cholecystectomy and need for repeat endoscopy removal.  She has followed with [facility] in [facility].  And is scheduled to have the gallbladder removed there.  She denies any fever.  She denies any shortness of breath or dyspnea exertion.    Patient seen and evaluated in the emergency department.  Will workup with CBC, CMP, lipase, troponin, 90-minute troponin, twelve-lead EKG, CT of the abdomen pelvis with contrast, will give fentanyl, ondansetron, and a liter of IV fluids.  Working diagnosis of acute pancreatitis, acute recurrent cholecystitis, acute recurrent cholelithiasis, gastritis, atypical midepigastric pain, rule out ACS although low suspicion for.    Patient's workup is back.  Workup shows CBC shows white count 12,000 with a left shift, lipase low at 12.  T. bili is mildly elevated 2.2 up from 0.8 at her baseline following her previous procedure.  Troponin is normal.  Influenza COVID are negative.  Urinalysis pending.  Twelve-lead EKG shows sinus rhythm with frequent PACs and at times a bigeminal pattern of

## 2025-02-20 ENCOUNTER — APPOINTMENT (OUTPATIENT)
Dept: MRI IMAGING | Age: 60
DRG: 871 | End: 2025-02-20
Attending: INTERNAL MEDICINE
Payer: COMMERCIAL

## 2025-02-20 ENCOUNTER — ANESTHESIA EVENT (OUTPATIENT)
Dept: ENDOSCOPY | Age: 60
End: 2025-02-20
Payer: COMMERCIAL

## 2025-02-20 LAB
ALBUMIN SERPL-MCNC: 1.7 G/DL (ref 3.5–5)
ALBUMIN/GLOB SERPL: 0.4 (ref 1–1.9)
ALP SERPL-CCNC: 103 U/L (ref 35–104)
ALT SERPL-CCNC: <5 U/L (ref 8–45)
ANION GAP SERPL CALC-SCNC: 5 MMOL/L (ref 7–16)
AST SERPL-CCNC: 10 U/L (ref 15–37)
BASOPHILS # BLD: 0.01 K/UL (ref 0–0.2)
BASOPHILS NFR BLD: 0.1 % (ref 0–2)
BILIRUB SERPL-MCNC: 1.1 MG/DL (ref 0–1.2)
BUN SERPL-MCNC: 12 MG/DL (ref 6–23)
CALCIUM SERPL-MCNC: 7.9 MG/DL (ref 8.8–10.2)
CHLORIDE SERPL-SCNC: 102 MMOL/L (ref 98–107)
CO2 SERPL-SCNC: 28 MMOL/L (ref 20–29)
CREAT SERPL-MCNC: 0.74 MG/DL (ref 0.6–1.1)
DIFFERENTIAL METHOD BLD: ABNORMAL
EOSINOPHIL # BLD: 0.03 K/UL (ref 0–0.8)
EOSINOPHIL NFR BLD: 0.4 % (ref 0.5–7.8)
ERYTHROCYTE [DISTWIDTH] IN BLOOD BY AUTOMATED COUNT: 13.8 % (ref 11.9–14.6)
GLOBULIN SER CALC-MCNC: 3.8 G/DL (ref 2.3–3.5)
GLUCOSE BLD STRIP.AUTO-MCNC: 125 MG/DL (ref 65–100)
GLUCOSE BLD STRIP.AUTO-MCNC: 190 MG/DL (ref 65–100)
GLUCOSE BLD STRIP.AUTO-MCNC: 195 MG/DL (ref 65–100)
GLUCOSE SERPL-MCNC: 128 MG/DL (ref 70–99)
HCT VFR BLD AUTO: 32.3 % (ref 35.8–46.3)
HGB BLD-MCNC: 10.9 G/DL (ref 11.7–15.4)
IMM GRANULOCYTES # BLD AUTO: 0.02 K/UL (ref 0–0.5)
IMM GRANULOCYTES NFR BLD AUTO: 0.3 % (ref 0–5)
LYMPHOCYTES # BLD: 1.51 K/UL (ref 0.5–4.6)
LYMPHOCYTES NFR BLD: 22.1 % (ref 13–44)
MAGNESIUM SERPL-MCNC: 1.7 MG/DL (ref 1.8–2.4)
MCH RBC QN AUTO: 30.5 PG (ref 26.1–32.9)
MCHC RBC AUTO-ENTMCNC: 33.7 G/DL (ref 31.4–35)
MCV RBC AUTO: 90.5 FL (ref 82–102)
MONOCYTES # BLD: 0.73 K/UL (ref 0.1–1.3)
MONOCYTES NFR BLD: 10.7 % (ref 4–12)
NEUTS SEG # BLD: 4.52 K/UL (ref 1.7–8.2)
NEUTS SEG NFR BLD: 66.4 % (ref 43–78)
NRBC # BLD: 0 K/UL (ref 0–0.2)
PLATELET # BLD AUTO: 161 K/UL (ref 150–450)
PMV BLD AUTO: 9.9 FL (ref 9.4–12.3)
POTASSIUM SERPL-SCNC: 3.2 MMOL/L (ref 3.5–5.1)
PROT SERPL-MCNC: 5.5 G/DL (ref 6.3–8.2)
RBC # BLD AUTO: 3.57 M/UL (ref 4.05–5.2)
SERVICE CMNT-IMP: ABNORMAL
SODIUM SERPL-SCNC: 135 MMOL/L (ref 136–145)
WBC # BLD AUTO: 6.8 K/UL (ref 4.3–11.1)

## 2025-02-20 PROCEDURE — 85025 COMPLETE CBC W/AUTO DIFF WBC: CPT

## 2025-02-20 PROCEDURE — 1100000000 HC RM PRIVATE

## 2025-02-20 PROCEDURE — 97161 PT EVAL LOW COMPLEX 20 MIN: CPT

## 2025-02-20 PROCEDURE — 6370000000 HC RX 637 (ALT 250 FOR IP): Performed by: STUDENT IN AN ORGANIZED HEALTH CARE EDUCATION/TRAINING PROGRAM

## 2025-02-20 PROCEDURE — 94760 N-INVAS EAR/PLS OXIMETRY 1: CPT

## 2025-02-20 PROCEDURE — 97530 THERAPEUTIC ACTIVITIES: CPT

## 2025-02-20 PROCEDURE — 6360000002 HC RX W HCPCS: Performed by: STUDENT IN AN ORGANIZED HEALTH CARE EDUCATION/TRAINING PROGRAM

## 2025-02-20 PROCEDURE — A9579 GAD-BASE MR CONTRAST NOS,1ML: HCPCS | Performed by: STUDENT IN AN ORGANIZED HEALTH CARE EDUCATION/TRAINING PROGRAM

## 2025-02-20 PROCEDURE — 6370000000 HC RX 637 (ALT 250 FOR IP): Performed by: NURSE PRACTITIONER

## 2025-02-20 PROCEDURE — 99254 IP/OBS CNSLTJ NEW/EST MOD 60: CPT | Performed by: STUDENT IN AN ORGANIZED HEALTH CARE EDUCATION/TRAINING PROGRAM

## 2025-02-20 PROCEDURE — 2580000003 HC RX 258: Performed by: STUDENT IN AN ORGANIZED HEALTH CARE EDUCATION/TRAINING PROGRAM

## 2025-02-20 PROCEDURE — 6360000004 HC RX CONTRAST MEDICATION: Performed by: STUDENT IN AN ORGANIZED HEALTH CARE EDUCATION/TRAINING PROGRAM

## 2025-02-20 PROCEDURE — 36415 COLL VENOUS BLD VENIPUNCTURE: CPT

## 2025-02-20 PROCEDURE — 74183 MRI ABD W/O CNTR FLWD CNTR: CPT

## 2025-02-20 PROCEDURE — 80053 COMPREHEN METABOLIC PANEL: CPT

## 2025-02-20 PROCEDURE — 97535 SELF CARE MNGMENT TRAINING: CPT

## 2025-02-20 PROCEDURE — 82962 GLUCOSE BLOOD TEST: CPT

## 2025-02-20 PROCEDURE — 83735 ASSAY OF MAGNESIUM: CPT

## 2025-02-20 PROCEDURE — 2500000003 HC RX 250 WO HCPCS: Performed by: STUDENT IN AN ORGANIZED HEALTH CARE EDUCATION/TRAINING PROGRAM

## 2025-02-20 PROCEDURE — 94640 AIRWAY INHALATION TREATMENT: CPT

## 2025-02-20 PROCEDURE — 97165 OT EVAL LOW COMPLEX 30 MIN: CPT

## 2025-02-20 RX ORDER — TRAZODONE HYDROCHLORIDE 50 MG/1
50 TABLET ORAL NIGHTLY
Status: DISCONTINUED | OUTPATIENT
Start: 2025-02-20 | End: 2025-02-26 | Stop reason: HOSPADM

## 2025-02-20 RX ORDER — SODIUM CHLORIDE 0.9 % (FLUSH) 0.9 %
40 SYRINGE (ML) INJECTION AS NEEDED
Status: DISCONTINUED | OUTPATIENT
Start: 2025-02-20 | End: 2025-02-26 | Stop reason: HOSPADM

## 2025-02-20 RX ADMIN — HYDROMORPHONE HYDROCHLORIDE 0.5 MG: 1 INJECTION, SOLUTION INTRAMUSCULAR; INTRAVENOUS; SUBCUTANEOUS at 00:16

## 2025-02-20 RX ADMIN — HYDROMORPHONE HYDROCHLORIDE 0.5 MG: 1 INJECTION, SOLUTION INTRAMUSCULAR; INTRAVENOUS; SUBCUTANEOUS at 09:17

## 2025-02-20 RX ADMIN — GABAPENTIN 800 MG: 400 CAPSULE ORAL at 20:13

## 2025-02-20 RX ADMIN — HYDROMORPHONE HYDROCHLORIDE 0.5 MG: 1 INJECTION, SOLUTION INTRAMUSCULAR; INTRAVENOUS; SUBCUTANEOUS at 21:16

## 2025-02-20 RX ADMIN — METOCLOPRAMIDE 10 MG: 5 INJECTION, SOLUTION INTRAMUSCULAR; INTRAVENOUS at 00:16

## 2025-02-20 RX ADMIN — ONDANSETRON 8 MG: 2 INJECTION, SOLUTION INTRAMUSCULAR; INTRAVENOUS at 05:24

## 2025-02-20 RX ADMIN — PIPERACILLIN AND TAZOBACTAM 3375 MG: 3; .375 INJECTION, POWDER, LYOPHILIZED, FOR SOLUTION INTRAVENOUS at 22:32

## 2025-02-20 RX ADMIN — TAMSULOSIN HYDROCHLORIDE 0.4 MG: 0.4 CAPSULE ORAL at 09:00

## 2025-02-20 RX ADMIN — POTASSIUM CHLORIDE 40 MEQ: 1500 TABLET, EXTENDED RELEASE ORAL at 08:59

## 2025-02-20 RX ADMIN — ONDANSETRON 8 MG: 2 INJECTION, SOLUTION INTRAMUSCULAR; INTRAVENOUS at 21:17

## 2025-02-20 RX ADMIN — GABAPENTIN 800 MG: 400 CAPSULE ORAL at 15:18

## 2025-02-20 RX ADMIN — GADOTERIDOL 20 ML: 279.3 INJECTION, SOLUTION INTRAVENOUS at 10:19

## 2025-02-20 RX ADMIN — ARFORMOTEROL TARTRATE: 15 SOLUTION RESPIRATORY (INHALATION) at 19:34

## 2025-02-20 RX ADMIN — HYDROMORPHONE HYDROCHLORIDE 0.5 MG: 1 INJECTION, SOLUTION INTRAMUSCULAR; INTRAVENOUS; SUBCUTANEOUS at 13:48

## 2025-02-20 RX ADMIN — SODIUM CHLORIDE, PRESERVATIVE FREE 10 ML: 5 INJECTION INTRAVENOUS at 00:02

## 2025-02-20 RX ADMIN — ARFORMOTEROL TARTRATE: 15 SOLUTION RESPIRATORY (INHALATION) at 08:02

## 2025-02-20 RX ADMIN — FUROSEMIDE 20 MG: 20 TABLET ORAL at 09:00

## 2025-02-20 RX ADMIN — DULOXETINE HYDROCHLORIDE 60 MG: 60 CAPSULE, DELAYED RELEASE ORAL at 09:00

## 2025-02-20 RX ADMIN — GABAPENTIN 800 MG: 400 CAPSULE ORAL at 11:26

## 2025-02-20 RX ADMIN — SODIUM CHLORIDE, PRESERVATIVE FREE 10 ML: 5 INJECTION INTRAVENOUS at 09:18

## 2025-02-20 RX ADMIN — SODIUM CHLORIDE, POTASSIUM CHLORIDE, SODIUM LACTATE AND CALCIUM CHLORIDE 1000 ML: 600; 310; 30; 20 INJECTION, SOLUTION INTRAVENOUS at 00:40

## 2025-02-20 RX ADMIN — INSULIN GLARGINE 35 UNITS: 100 INJECTION, SOLUTION SUBCUTANEOUS at 20:15

## 2025-02-20 RX ADMIN — MONTELUKAST 10 MG: 10 TABLET, FILM COATED ORAL at 20:13

## 2025-02-20 RX ADMIN — PIPERACILLIN AND TAZOBACTAM 3375 MG: 3; .375 INJECTION, POWDER, LYOPHILIZED, FOR SOLUTION INTRAVENOUS at 15:19

## 2025-02-20 RX ADMIN — TRAZODONE HYDROCHLORIDE 50 MG: 50 TABLET ORAL at 22:31

## 2025-02-20 RX ADMIN — INSULIN GLARGINE 35 UNITS: 100 INJECTION, SOLUTION SUBCUTANEOUS at 12:03

## 2025-02-20 RX ADMIN — SODIUM CHLORIDE, PRESERVATIVE FREE 10 ML: 5 INJECTION INTRAVENOUS at 20:13

## 2025-02-20 RX ADMIN — ATORVASTATIN CALCIUM 10 MG: 10 TABLET, FILM COATED ORAL at 20:13

## 2025-02-20 RX ADMIN — HYDROMORPHONE HYDROCHLORIDE 0.5 MG: 1 INJECTION, SOLUTION INTRAMUSCULAR; INTRAVENOUS; SUBCUTANEOUS at 05:23

## 2025-02-20 RX ADMIN — PIPERACILLIN AND TAZOBACTAM 3375 MG: 3; .375 INJECTION, POWDER, LYOPHILIZED, FOR SOLUTION INTRAVENOUS at 06:05

## 2025-02-20 ASSESSMENT — PAIN DESCRIPTION - PAIN TYPE
TYPE: ACUTE PAIN
TYPE: ACUTE PAIN

## 2025-02-20 ASSESSMENT — PAIN DESCRIPTION - LOCATION
LOCATION: ABDOMEN

## 2025-02-20 ASSESSMENT — COPD QUESTIONNAIRES: CAT_SEVERITY: MILD

## 2025-02-20 ASSESSMENT — PAIN - FUNCTIONAL ASSESSMENT
PAIN_FUNCTIONAL_ASSESSMENT: PREVENTS OR INTERFERES SOME ACTIVE ACTIVITIES AND ADLS
PAIN_FUNCTIONAL_ASSESSMENT: PREVENTS OR INTERFERES SOME ACTIVE ACTIVITIES AND ADLS
PAIN_FUNCTIONAL_ASSESSMENT: ACTIVITIES ARE NOT PREVENTED

## 2025-02-20 ASSESSMENT — PAIN SCALES - GENERAL
PAINLEVEL_OUTOF10: 10
PAINLEVEL_OUTOF10: 7
PAINLEVEL_OUTOF10: 8
PAINLEVEL_OUTOF10: 3
PAINLEVEL_OUTOF10: 2
PAINLEVEL_OUTOF10: 4
PAINLEVEL_OUTOF10: 10
PAINLEVEL_OUTOF10: 7
PAINLEVEL_OUTOF10: 10

## 2025-02-20 ASSESSMENT — PAIN DESCRIPTION - ORIENTATION
ORIENTATION: RIGHT;UPPER
ORIENTATION: UPPER
ORIENTATION: ANTERIOR
ORIENTATION: RIGHT;UPPER

## 2025-02-20 ASSESSMENT — PAIN DESCRIPTION - ONSET
ONSET: ON-GOING
ONSET: ON-GOING

## 2025-02-20 ASSESSMENT — PAIN DESCRIPTION - DESCRIPTORS
DESCRIPTORS: CRAMPING
DESCRIPTORS: ACHING
DESCRIPTORS: DISCOMFORT;PRESSURE
DESCRIPTORS: CRAMPING

## 2025-02-20 ASSESSMENT — PAIN DESCRIPTION - FREQUENCY
FREQUENCY: CONTINUOUS
FREQUENCY: CONTINUOUS

## 2025-02-20 NOTE — H&P
Hospitalist History and Physical   Admit Date:  2025  6:35 PM   Name:  Miguel A Zambrano   Age:  59 y.o.  Sex:  female  :  1965   MRN:  [id number]   Room:      Presenting/Chief Complaint: No chief complaint on file.     Reason(s) for Admission: Acute cholecystitis [K81.0]     History of Present Illness:   Miguel A Zambrano is a 59 y.o. 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  Also had sharp right upper quadrant pain, not related to food.  No fever, chills, chest pain, or diarrhea.  Initially presented to [facility].    In [facility], vitals were significant with HR up to 96 and RR up to 21.  WBC 12.  CMP relatively unremarkable except .  CT abdomen showed an evidence of acute cholecystitis with cholelithiasis.  GI and general surgery were consulted, and a transfer to [facility] was recommended.  She received Zosyn.      Assessment & Plan:       Sepsis  -Met SIRS criteria with tachycardia, tachypnea, and leukocytosis  -In the setting of acute cholecystitis  -No evidence of a lactic acidosis or hypotension  -Treating underlying cause      Acute cholecystitis with cholelithiasis  -Noted in CT abdomen  -Will order MRCP  -Will continue Zosyn  -GI and general surgery were consulted.  Appreciate recommendations  -Of note, the patient previously followed GI at [facility].  Apparently, they were planning on cholecystectomy in the near future.  Will await GI and general surgery recommendations regarding the location of surgery.      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

## 2025-02-20 NOTE — CARE COORDINATION
RNCM met with patient in room 217 to discuss discharge planning.    Patient lives with spouse in a 1st floor apartment. Patient is independent at baseline and an active . Patient has no current DME or home care services. Demographics and PCP verifed. Patient uses [facility] in [place].    PT/OT recommend home health at discharge. Patient requests referral to [facility]. CM following.       02/20/25 1039   Service Assessment   Patient Orientation Alert and Oriented   Cognition Alert   History Provided By Patient   Primary Caregiver Self   Accompanied By/Relationship n/a   Support Systems Spouse/Significant Other   Patient's Healthcare Decision Maker is: Legal Next of Kin   PCP Verified by CM Yes   Prior Functional Level Independent in ADLs/IADLs   Current Functional Level Independent in ADLs/IADLs   Can patient return to prior living arrangement Yes   Ability to make needs known: Good   Family able to assist with home care needs: Yes   Would you like for me to discuss the discharge plan with any other family members/significant others, and if so, who? No   Financial Resources Other (Comment)  (commercial insurance)   Community Resources None   Social/Functional History   Lives With Spouse   Type of Home Apartment   Home Layout One level   Home Access Level entry   Receives Help From Family   Prior Level of Assist for ADLs Independent   Ambulation Assistance Independent   Prior Level of Assist for Transfers Independent   Active  Yes   Occupation Retired   Discharge Planning   Type of Residence Apartment   Living Arrangements Spouse/Significant Other   Current Services Prior To Admission None   Potential Assistance Needed Home Care   DME Ordered? No   Potential Assistance Purchasing Medications No   Type of Home Care Services None   Patient expects to be discharged to: Apartment   One/Two Story Residence One story   History of falls? 1
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
## 2025-02-20 NOTE — PLAN OF CARE
Problem: Chronic Conditions and Co-morbidities  Goal: Patient's chronic conditions and co-morbidity symptoms are monitored and maintained or improved  Outcome: Progressing  Flowsheets (Taken 2/19/2025 2000)  Care Plan - Patient's Chronic Conditions and Co-Morbidity Symptoms are Monitored and Maintained or Improved: Monitor and assess patient's chronic conditions and comorbid symptoms for stability, deterioration, or improvement     Problem: Safety - Adult  Goal: Free from fall injury  Outcome: Progressing

## 2025-02-20 NOTE — PLAN OF CARE
Problem: Chronic Conditions and Co-morbidities  Goal: Patient's chronic conditions and co-morbidity symptoms are monitored and maintained or improved  2/20/2025 0756 by Gris Villegas RN  Outcome: Progressing  2/20/2025 0551 by Pamella Maxwell RN  Outcome: Progressing  Flowsheets (Taken 2/19/2025 2000)  Care Plan - Patient's Chronic Conditions and Co-Morbidity Symptoms are Monitored and Maintained or Improved: Monitor and assess patient's chronic conditions and comorbid symptoms for stability, deterioration, or improvement     Problem: Discharge Planning  Goal: Discharge to home or other facility with appropriate resources  Outcome: Progressing  Flowsheets (Taken 2/19/2025 2000 by Pamella Maxwell, RN)  Discharge to home or other facility with appropriate resources: Identify barriers to discharge with patient and caregiver     Problem: Safety - Adult  Goal: Free from fall injury  2/20/2025 0756 by Gris Villegas RN  Outcome: Progressing  2/20/2025 0551 by Pamella Maxwell RN  Outcome: Progressing

## 2025-02-21 ENCOUNTER — ANESTHESIA (OUTPATIENT)
Dept: ENDOSCOPY | Age: 60
End: 2025-02-21
Payer: COMMERCIAL

## 2025-02-21 ENCOUNTER — APPOINTMENT (OUTPATIENT)
Dept: GENERAL RADIOLOGY | Age: 60
DRG: 871 | End: 2025-02-21
Attending: INTERNAL MEDICINE
Payer: COMMERCIAL

## 2025-02-21 LAB
ALBUMIN SERPL-MCNC: 1.6 G/DL (ref 3.5–5)
ALBUMIN/GLOB SERPL: 0.4 (ref 1–1.9)
ALP SERPL-CCNC: 95 U/L (ref 35–104)
ALT SERPL-CCNC: <5 U/L (ref 8–45)
ANION GAP SERPL CALC-SCNC: 4 MMOL/L (ref 7–16)
AST SERPL-CCNC: 12 U/L (ref 15–37)
BASOPHILS # BLD: 0.01 K/UL (ref 0–0.2)
BASOPHILS NFR BLD: 0.2 % (ref 0–2)
BILIRUB SERPL-MCNC: 0.7 MG/DL (ref 0–1.2)
BUN SERPL-MCNC: 8 MG/DL (ref 6–23)
CALCIUM SERPL-MCNC: 8.1 MG/DL (ref 8.8–10.2)
CHLORIDE SERPL-SCNC: 103 MMOL/L (ref 98–107)
CO2 SERPL-SCNC: 29 MMOL/L (ref 20–29)
CREAT SERPL-MCNC: 0.76 MG/DL (ref 0.6–1.1)
DIFFERENTIAL METHOD BLD: ABNORMAL
EOSINOPHIL # BLD: 0.12 K/UL (ref 0–0.8)
EOSINOPHIL NFR BLD: 2.9 % (ref 0.5–7.8)
ERYTHROCYTE [DISTWIDTH] IN BLOOD BY AUTOMATED COUNT: 13.4 % (ref 11.9–14.6)
GLOBULIN SER CALC-MCNC: 3.7 G/DL (ref 2.3–3.5)
GLUCOSE BLD STRIP.AUTO-MCNC: 104 MG/DL (ref 65–100)
GLUCOSE BLD STRIP.AUTO-MCNC: 129 MG/DL (ref 65–100)
GLUCOSE BLD STRIP.AUTO-MCNC: 134 MG/DL (ref 65–100)
GLUCOSE BLD STRIP.AUTO-MCNC: 215 MG/DL (ref 65–100)
GLUCOSE SERPL-MCNC: 115 MG/DL (ref 70–99)
HCT VFR BLD AUTO: 33.2 % (ref 35.8–46.3)
HGB BLD-MCNC: 11.1 G/DL (ref 11.7–15.4)
IMM GRANULOCYTES # BLD AUTO: 0.01 K/UL (ref 0–0.5)
IMM GRANULOCYTES NFR BLD AUTO: 0.2 % (ref 0–5)
LYMPHOCYTES # BLD: 1.65 K/UL (ref 0.5–4.6)
LYMPHOCYTES NFR BLD: 40.5 % (ref 13–44)
MCH RBC QN AUTO: 30.5 PG (ref 26.1–32.9)
MCHC RBC AUTO-ENTMCNC: 33.4 G/DL (ref 31.4–35)
MCV RBC AUTO: 91.2 FL (ref 82–102)
MONOCYTES # BLD: 0.43 K/UL (ref 0.1–1.3)
MONOCYTES NFR BLD: 10.6 % (ref 4–12)
NEUTS SEG # BLD: 1.85 K/UL (ref 1.7–8.2)
NEUTS SEG NFR BLD: 45.6 % (ref 43–78)
NRBC # BLD: 0 K/UL (ref 0–0.2)
PLATELET # BLD AUTO: 171 K/UL (ref 150–450)
PMV BLD AUTO: 9.5 FL (ref 9.4–12.3)
POTASSIUM SERPL-SCNC: 3.7 MMOL/L (ref 3.5–5.1)
PROT SERPL-MCNC: 5.3 G/DL (ref 6.3–8.2)
RBC # BLD AUTO: 3.64 M/UL (ref 4.05–5.2)
SERVICE CMNT-IMP: ABNORMAL
SODIUM SERPL-SCNC: 136 MMOL/L (ref 136–145)
WBC # BLD AUTO: 4.1 K/UL (ref 4.3–11.1)

## 2025-02-21 PROCEDURE — 94760 N-INVAS EAR/PLS OXIMETRY 1: CPT

## 2025-02-21 PROCEDURE — 3700000001 HC ADD 15 MINUTES (ANESTHESIA): Performed by: INTERNAL MEDICINE

## 2025-02-21 PROCEDURE — 6370000000 HC RX 637 (ALT 250 FOR IP): Performed by: STUDENT IN AN ORGANIZED HEALTH CARE EDUCATION/TRAINING PROGRAM

## 2025-02-21 PROCEDURE — 36415 COLL VENOUS BLD VENIPUNCTURE: CPT

## 2025-02-21 PROCEDURE — 3700000000 HC ANESTHESIA ATTENDED CARE: Performed by: INTERNAL MEDICINE

## 2025-02-21 PROCEDURE — 43264 ERCP REMOVE DUCT CALCULI: CPT | Performed by: INTERNAL MEDICINE

## 2025-02-21 PROCEDURE — 94640 AIRWAY INHALATION TREATMENT: CPT

## 2025-02-21 PROCEDURE — 2709999900 HC NON-CHARGEABLE SUPPLY: Performed by: INTERNAL MEDICINE

## 2025-02-21 PROCEDURE — 0FC88ZZ EXTIRPATION OF MATTER FROM CYSTIC DUCT, VIA NATURAL OR ARTIFICIAL OPENING ENDOSCOPIC: ICD-10-PCS | Performed by: INTERNAL MEDICINE

## 2025-02-21 PROCEDURE — 6360000002 HC RX W HCPCS: Performed by: NURSE ANESTHETIST, CERTIFIED REGISTERED

## 2025-02-21 PROCEDURE — 2500000003 HC RX 250 WO HCPCS: Performed by: STUDENT IN AN ORGANIZED HEALTH CARE EDUCATION/TRAINING PROGRAM

## 2025-02-21 PROCEDURE — 7100000000 HC PACU RECOVERY - FIRST 15 MIN: Performed by: INTERNAL MEDICINE

## 2025-02-21 PROCEDURE — C2625 STENT, NON-COR, TEM W/DEL SY: HCPCS | Performed by: INTERNAL MEDICINE

## 2025-02-21 PROCEDURE — 0F778DZ DILATION OF COMMON HEPATIC DUCT WITH INTRALUMINAL DEVICE, VIA NATURAL OR ARTIFICIAL OPENING ENDOSCOPIC: ICD-10-PCS | Performed by: INTERNAL MEDICINE

## 2025-02-21 PROCEDURE — 3609015000 HC ERCP REMOVE FOREIGN BODY/STENT BILIARY/PANC DUCT: Performed by: INTERNAL MEDICINE

## 2025-02-21 PROCEDURE — 43276 ERCP STENT EXCHANGE W/DILATE: CPT | Performed by: INTERNAL MEDICINE

## 2025-02-21 PROCEDURE — 7100000001 HC PACU RECOVERY - ADDTL 15 MIN: Performed by: INTERNAL MEDICINE

## 2025-02-21 PROCEDURE — 2580000003 HC RX 258: Performed by: ANESTHESIOLOGY

## 2025-02-21 PROCEDURE — 85025 COMPLETE CBC W/AUTO DIFF WBC: CPT

## 2025-02-21 PROCEDURE — 2720000010 HC SURG SUPPLY STERILE: Performed by: INTERNAL MEDICINE

## 2025-02-21 PROCEDURE — 6370000000 HC RX 637 (ALT 250 FOR IP): Performed by: ANESTHESIOLOGY

## 2025-02-21 PROCEDURE — 0F9430Z DRAINAGE OF GALLBLADDER WITH DRAINAGE DEVICE, PERCUTANEOUS APPROACH: ICD-10-PCS | Performed by: INTERNAL MEDICINE

## 2025-02-21 PROCEDURE — 82962 GLUCOSE BLOOD TEST: CPT

## 2025-02-21 PROCEDURE — 80053 COMPREHEN METABOLIC PANEL: CPT

## 2025-02-21 PROCEDURE — 6360000002 HC RX W HCPCS: Performed by: STUDENT IN AN ORGANIZED HEALTH CARE EDUCATION/TRAINING PROGRAM

## 2025-02-21 PROCEDURE — 6370000000 HC RX 637 (ALT 250 FOR IP): Performed by: NURSE PRACTITIONER

## 2025-02-21 PROCEDURE — C1769 GUIDE WIRE: HCPCS | Performed by: INTERNAL MEDICINE

## 2025-02-21 PROCEDURE — 2580000003 HC RX 258: Performed by: NURSE ANESTHETIST, CERTIFIED REGISTERED

## 2025-02-21 PROCEDURE — 1100000000 HC RM PRIVATE

## 2025-02-21 PROCEDURE — 6360000002 HC RX W HCPCS: Performed by: ANESTHESIOLOGY

## 2025-02-21 PROCEDURE — 2500000003 HC RX 250 WO HCPCS: Performed by: NURSE ANESTHETIST, CERTIFIED REGISTERED

## 2025-02-21 PROCEDURE — 0FPB8DZ REMOVAL OF INTRALUMINAL DEVICE FROM HEPATOBILIARY DUCT, VIA NATURAL OR ARTIFICIAL OPENING ENDOSCOPIC: ICD-10-PCS | Performed by: INTERNAL MEDICINE

## 2025-02-21 PROCEDURE — 74328 X-RAY BILE DUCT ENDOSCOPY: CPT | Performed by: INTERNAL MEDICINE

## 2025-02-21 PROCEDURE — 2580000003 HC RX 258: Performed by: STUDENT IN AN ORGANIZED HEALTH CARE EDUCATION/TRAINING PROGRAM

## 2025-02-21 DEVICE — BILIARY STENT WITH NAVIFLEXTM RX DELIVERY SYSTEM
Type: IMPLANTABLE DEVICE | Site: COMMON BILE DUCT | Status: FUNCTIONAL
Brand: ADVANIX™ BILIARY

## 2025-02-21 RX ORDER — HYDRALAZINE HYDROCHLORIDE 20 MG/ML
10 INJECTION INTRAMUSCULAR; INTRAVENOUS
Status: DISCONTINUED | OUTPATIENT
Start: 2025-02-21 | End: 2025-02-26 | Stop reason: HOSPADM

## 2025-02-21 RX ORDER — OXYCODONE HYDROCHLORIDE 5 MG/1
5 TABLET ORAL
Status: COMPLETED | OUTPATIENT
Start: 2025-02-21 | End: 2025-02-21

## 2025-02-21 RX ORDER — FENTANYL CITRATE 50 UG/ML
100 INJECTION, SOLUTION INTRAMUSCULAR; INTRAVENOUS
Status: DISCONTINUED | OUTPATIENT
Start: 2025-02-21 | End: 2025-02-21 | Stop reason: HOSPADM

## 2025-02-21 RX ORDER — MIDAZOLAM HYDROCHLORIDE 2 MG/2ML
2 INJECTION, SOLUTION INTRAMUSCULAR; INTRAVENOUS
Status: DISCONTINUED | OUTPATIENT
Start: 2025-02-21 | End: 2025-02-21 | Stop reason: HOSPADM

## 2025-02-21 RX ORDER — SODIUM CHLORIDE 0.9 % (FLUSH) 0.9 %
5-40 SYRINGE (ML) INJECTION PRN
Status: DISCONTINUED | OUTPATIENT
Start: 2025-02-21 | End: 2025-02-21 | Stop reason: HOSPADM

## 2025-02-21 RX ORDER — SODIUM CHLORIDE 0.9 % (FLUSH) 0.9 %
5-40 SYRINGE (ML) INJECTION EVERY 12 HOURS SCHEDULED
Status: DISCONTINUED | OUTPATIENT
Start: 2025-02-21 | End: 2025-02-21 | Stop reason: HOSPADM

## 2025-02-21 RX ORDER — GLYCOPYRROLATE 0.2 MG/ML
INJECTION INTRAMUSCULAR; INTRAVENOUS
Status: DISCONTINUED | OUTPATIENT
Start: 2025-02-21 | End: 2025-02-21 | Stop reason: SDUPTHER

## 2025-02-21 RX ORDER — LABETALOL HYDROCHLORIDE 5 MG/ML
10 INJECTION, SOLUTION INTRAVENOUS
Status: DISCONTINUED | OUTPATIENT
Start: 2025-02-21 | End: 2025-02-26 | Stop reason: HOSPADM

## 2025-02-21 RX ORDER — VASOPRESSIN 20 [USP'U]/ML
INJECTION, SOLUTION INTRAVENOUS
Status: DISCONTINUED | OUTPATIENT
Start: 2025-02-21 | End: 2025-02-21 | Stop reason: SDUPTHER

## 2025-02-21 RX ORDER — ONDANSETRON 2 MG/ML
4 INJECTION INTRAMUSCULAR; INTRAVENOUS
Status: COMPLETED | OUTPATIENT
Start: 2025-02-21 | End: 2025-02-21

## 2025-02-21 RX ORDER — NEOSTIGMINE METHYLSULFATE 1 MG/ML
INJECTION, SOLUTION INTRAVENOUS
Status: DISCONTINUED | OUTPATIENT
Start: 2025-02-21 | End: 2025-02-21 | Stop reason: SDUPTHER

## 2025-02-21 RX ORDER — ONDANSETRON 2 MG/ML
INJECTION INTRAMUSCULAR; INTRAVENOUS
Status: DISCONTINUED | OUTPATIENT
Start: 2025-02-21 | End: 2025-02-21 | Stop reason: SDUPTHER

## 2025-02-21 RX ORDER — SUCCINYLCHOLINE CHLORIDE 20 MG/ML
INJECTION INTRAMUSCULAR; INTRAVENOUS
Status: DISCONTINUED | OUTPATIENT
Start: 2025-02-21 | End: 2025-02-21 | Stop reason: SDUPTHER

## 2025-02-21 RX ORDER — PROPOFOL 10 MG/ML
INJECTION, EMULSION INTRAVENOUS
Status: DISCONTINUED | OUTPATIENT
Start: 2025-02-21 | End: 2025-02-21 | Stop reason: SDUPTHER

## 2025-02-21 RX ORDER — PROCHLORPERAZINE EDISYLATE 5 MG/ML
5 INJECTION INTRAMUSCULAR; INTRAVENOUS
Status: ACTIVE | OUTPATIENT
Start: 2025-02-21 | End: 2025-02-22

## 2025-02-21 RX ORDER — SODIUM CHLORIDE 9 MG/ML
INJECTION, SOLUTION INTRAVENOUS PRN
Status: DISCONTINUED | OUTPATIENT
Start: 2025-02-21 | End: 2025-02-21 | Stop reason: HOSPADM

## 2025-02-21 RX ORDER — LIDOCAINE HYDROCHLORIDE 10 MG/ML
1 INJECTION, SOLUTION INFILTRATION; PERINEURAL
Status: DISCONTINUED | OUTPATIENT
Start: 2025-02-21 | End: 2025-02-21 | Stop reason: HOSPADM

## 2025-02-21 RX ORDER — ROCURONIUM BROMIDE 10 MG/ML
INJECTION, SOLUTION INTRAVENOUS
Status: DISCONTINUED | OUTPATIENT
Start: 2025-02-21 | End: 2025-02-21 | Stop reason: SDUPTHER

## 2025-02-21 RX ORDER — NALOXONE HYDROCHLORIDE 0.4 MG/ML
INJECTION, SOLUTION INTRAMUSCULAR; INTRAVENOUS; SUBCUTANEOUS PRN
Status: DISCONTINUED | OUTPATIENT
Start: 2025-02-21 | End: 2025-02-26 | Stop reason: HOSPADM

## 2025-02-21 RX ORDER — DEXAMETHASONE SODIUM PHOSPHATE 4 MG/ML
INJECTION, SOLUTION INTRA-ARTICULAR; INTRALESIONAL; INTRAMUSCULAR; INTRAVENOUS; SOFT TISSUE
Status: DISCONTINUED | OUTPATIENT
Start: 2025-02-21 | End: 2025-02-21 | Stop reason: SDUPTHER

## 2025-02-21 RX ORDER — LIDOCAINE HYDROCHLORIDE 20 MG/ML
INJECTION, SOLUTION EPIDURAL; INFILTRATION; INTRACAUDAL; PERINEURAL
Status: DISCONTINUED | OUTPATIENT
Start: 2025-02-21 | End: 2025-02-21 | Stop reason: SDUPTHER

## 2025-02-21 RX ORDER — SODIUM CHLORIDE, SODIUM LACTATE, POTASSIUM CHLORIDE, CALCIUM CHLORIDE 600; 310; 30; 20 MG/100ML; MG/100ML; MG/100ML; MG/100ML
INJECTION, SOLUTION INTRAVENOUS CONTINUOUS
Status: DISCONTINUED | OUTPATIENT
Start: 2025-02-21 | End: 2025-02-21 | Stop reason: HOSPADM

## 2025-02-21 RX ADMIN — GABAPENTIN 800 MG: 400 CAPSULE ORAL at 17:48

## 2025-02-21 RX ADMIN — ATORVASTATIN CALCIUM 10 MG: 10 TABLET, FILM COATED ORAL at 21:55

## 2025-02-21 RX ADMIN — INSULIN LISPRO 1 UNITS: 100 INJECTION, SOLUTION INTRAVENOUS; SUBCUTANEOUS at 21:55

## 2025-02-21 RX ADMIN — PHENYLEPHRINE HYDROCHLORIDE 100 MCG: 10 INJECTION INTRAVENOUS at 12:19

## 2025-02-21 RX ADMIN — HYDROMORPHONE HYDROCHLORIDE 0.5 MG: 1 INJECTION, SOLUTION INTRAMUSCULAR; INTRAVENOUS; SUBCUTANEOUS at 17:55

## 2025-02-21 RX ADMIN — INSULIN GLARGINE 35 UNITS: 100 INJECTION, SOLUTION SUBCUTANEOUS at 21:56

## 2025-02-21 RX ADMIN — LIDOCAINE HYDROCHLORIDE 100 MG: 20 INJECTION, SOLUTION EPIDURAL; INFILTRATION; INTRACAUDAL; PERINEURAL at 12:13

## 2025-02-21 RX ADMIN — SODIUM CHLORIDE, SODIUM LACTATE, POTASSIUM CHLORIDE, AND CALCIUM CHLORIDE: 600; 310; 30; 20 INJECTION, SOLUTION INTRAVENOUS at 12:06

## 2025-02-21 RX ADMIN — HYDROMORPHONE HYDROCHLORIDE 0.5 MG: 1 INJECTION, SOLUTION INTRAMUSCULAR; INTRAVENOUS; SUBCUTANEOUS at 21:56

## 2025-02-21 RX ADMIN — PHENYLEPHRINE HYDROCHLORIDE 100 MCG: 10 INJECTION INTRAVENOUS at 12:23

## 2025-02-21 RX ADMIN — DULOXETINE HYDROCHLORIDE 60 MG: 60 CAPSULE, DELAYED RELEASE ORAL at 09:55

## 2025-02-21 RX ADMIN — TAMSULOSIN HYDROCHLORIDE 0.4 MG: 0.4 CAPSULE ORAL at 09:55

## 2025-02-21 RX ADMIN — TRAZODONE HYDROCHLORIDE 50 MG: 50 TABLET ORAL at 21:55

## 2025-02-21 RX ADMIN — GLYCOPYRROLATE 0.4 MG: 0.2 INJECTION INTRAMUSCULAR; INTRAVENOUS at 12:36

## 2025-02-21 RX ADMIN — PROPOFOL 160 MG: 10 INJECTION, EMULSION INTRAVENOUS at 12:13

## 2025-02-21 RX ADMIN — PHENYLEPHRINE HYDROCHLORIDE 100 MCG: 10 INJECTION INTRAVENOUS at 12:17

## 2025-02-21 RX ADMIN — ONDANSETRON 4 MG: 2 INJECTION, SOLUTION INTRAMUSCULAR; INTRAVENOUS at 13:53

## 2025-02-21 RX ADMIN — PHENYLEPHRINE HYDROCHLORIDE 100 MCG: 10 INJECTION INTRAVENOUS at 12:22

## 2025-02-21 RX ADMIN — Medication 150 MG: at 12:14

## 2025-02-21 RX ADMIN — ROCURONIUM BROMIDE 10 MG: 10 INJECTION, SOLUTION INTRAVENOUS at 12:13

## 2025-02-21 RX ADMIN — MONTELUKAST 10 MG: 10 TABLET, FILM COATED ORAL at 21:55

## 2025-02-21 RX ADMIN — PHENYLEPHRINE HYDROCHLORIDE 100 MCG: 10 INJECTION INTRAVENOUS at 12:25

## 2025-02-21 RX ADMIN — GABAPENTIN 800 MG: 400 CAPSULE ORAL at 09:55

## 2025-02-21 RX ADMIN — GABAPENTIN 800 MG: 400 CAPSULE ORAL at 21:55

## 2025-02-21 RX ADMIN — PHENYLEPHRINE HYDROCHLORIDE 150 MCG: 10 INJECTION INTRAVENOUS at 12:27

## 2025-02-21 RX ADMIN — PIPERACILLIN AND TAZOBACTAM 3375 MG: 3; .375 INJECTION, POWDER, LYOPHILIZED, FOR SOLUTION INTRAVENOUS at 06:07

## 2025-02-21 RX ADMIN — ONDANSETRON 4 MG: 2 INJECTION, SOLUTION INTRAMUSCULAR; INTRAVENOUS at 12:20

## 2025-02-21 RX ADMIN — PIPERACILLIN AND TAZOBACTAM 3375 MG: 3; .375 INJECTION, POWDER, LYOPHILIZED, FOR SOLUTION INTRAVENOUS at 17:47

## 2025-02-21 RX ADMIN — HYDROMORPHONE HYDROCHLORIDE 0.5 MG: 1 INJECTION, SOLUTION INTRAMUSCULAR; INTRAVENOUS; SUBCUTANEOUS at 09:57

## 2025-02-21 RX ADMIN — PHENYLEPHRINE HYDROCHLORIDE 100 MCG: 10 INJECTION INTRAVENOUS at 12:21

## 2025-02-21 RX ADMIN — SODIUM CHLORIDE, PRESERVATIVE FREE 10 ML: 5 INJECTION INTRAVENOUS at 09:55

## 2025-02-21 RX ADMIN — VASOPRESSIN 1 UNITS: 20 INJECTION PARENTERAL at 12:28

## 2025-02-21 RX ADMIN — ONDANSETRON 8 MG: 2 INJECTION, SOLUTION INTRAMUSCULAR; INTRAVENOUS at 09:58

## 2025-02-21 RX ADMIN — ARFORMOTEROL TARTRATE: 15 SOLUTION RESPIRATORY (INHALATION) at 08:38

## 2025-02-21 RX ADMIN — Medication 3 MG: at 12:36

## 2025-02-21 RX ADMIN — DEXAMETHASONE SODIUM PHOSPHATE 4 MG: 4 INJECTION INTRA-ARTICULAR; INTRALESIONAL; INTRAMUSCULAR; INTRAVENOUS; SOFT TISSUE at 12:20

## 2025-02-21 RX ADMIN — PHENYLEPHRINE HYDROCHLORIDE 150 MCG: 10 INJECTION INTRAVENOUS at 12:26

## 2025-02-21 RX ADMIN — OXYCODONE HYDROCHLORIDE 5 MG: 5 TABLET ORAL at 13:38

## 2025-02-21 RX ADMIN — SODIUM CHLORIDE, PRESERVATIVE FREE 10 ML: 5 INJECTION INTRAVENOUS at 21:57

## 2025-02-21 ASSESSMENT — PAIN DESCRIPTION - LOCATION
LOCATION: ABDOMEN

## 2025-02-21 ASSESSMENT — PAIN DESCRIPTION - DESCRIPTORS
DESCRIPTORS: SHARP;SORE
DESCRIPTORS: ACHING;DISCOMFORT
DESCRIPTORS: ACHING;DISCOMFORT;SHARP
DESCRIPTORS: DISCOMFORT;GNAWING

## 2025-02-21 ASSESSMENT — PAIN SCALES - GENERAL
PAINLEVEL_OUTOF10: 7
PAINLEVEL_OUTOF10: 8
PAINLEVEL_OUTOF10: 8
PAINLEVEL_OUTOF10: 9
PAINLEVEL_OUTOF10: 9

## 2025-02-21 ASSESSMENT — PAIN - FUNCTIONAL ASSESSMENT
PAIN_FUNCTIONAL_ASSESSMENT: 0-10
PAIN_FUNCTIONAL_ASSESSMENT: 0-10
PAIN_FUNCTIONAL_ASSESSMENT: ACTIVITIES ARE NOT PREVENTED
PAIN_FUNCTIONAL_ASSESSMENT: ACTIVITIES ARE NOT PREVENTED

## 2025-02-21 ASSESSMENT — PAIN DESCRIPTION - ORIENTATION
ORIENTATION: RIGHT;MID
ORIENTATION: UPPER;MID;RIGHT
ORIENTATION: ANTERIOR

## 2025-02-21 NOTE — PLAN OF CARE
Problem: Discharge Planning  Goal: Discharge to home or other facility with appropriate resources  2/21/2025 0736 by Gris Villegas, RN  Outcome: Progressing  2/20/2025 2050 by Josee Osorio, RN  Outcome: Progressing  Flowsheets (Taken 2/20/2025 0836 by Gris Villegas, RN)  Discharge to home or other facility with appropriate resources:   Identify barriers to discharge with patient and caregiver   Arrange for needed discharge resources and transportation as appropriate   Identify discharge learning needs (meds, wound care, etc)     Problem: Safety - Adult  Goal: Free from fall injury  2/20/2025 2050 by Josee Osorio, RN  Outcome: Progressing

## 2025-02-21 NOTE — PERIOP NOTE
TRANSFER - OUT REPORT:    Verbal report given to Gris PETERSON on Miguel A Zambrano  being transferred to [facility] for routine progression of patient care       Report consisted of patient’s Situation, Background, Assessment and   Recommendations(SBAR).     Information from the following report(s) Surgery Report, Intake/Output, MAR, Alarm Parameters, and Neuro Assessment was reviewed with the receiving nurse.    Lines:   Peripheral IV 02/19/25 Right Antecubital (Active)   Site Assessment Clean, dry & intact 02/21/25 1340   Line Status Flushed;Infusing 02/21/25 1340   Line Care Ports disinfected;Connections checked and tightened;Cap changed 02/21/25 1340   Phlebitis Assessment No symptoms 02/21/25 1340   Infiltration Assessment 0 02/21/25 1340   Alcohol Cap Used Yes 02/21/25 1340   Dressing Status Clean, dry & intact 02/21/25 1340   Dressing Type Transparent 02/21/25 1340        Opportunity for questions and clarification was provided.      Patient transported with:   Tech    VTE prophylaxis orders have not been written for Miguel A Zambrano.    Patient and family given floor number and nurses name.  Family updated re: pt status after security code verified.

## 2025-02-21 NOTE — ANESTHESIA POSTPROCEDURE EVALUATION
Department of Anesthesiology  Postprocedure Note    Patient: Miguel A Zambrano  MRN: [id number]  YOB: 1965  Date of evaluation: 2/21/2025    Procedure Summary       Date: 02/21/25 Room / Location: [facility] ENDO TRAVEL 1 / [facility] ENDOSCOPY    Anesthesia Start: 1202 Anesthesia Stop: 1250    Procedure: ENDOSCOPIC RETROGRADE CHOLANGIOPANCREATOGRAPHY STENT REMOVAL/ BALLOON SWEEP/ STONE REMOVAL/STENT PLACEMENT (Upper GI Region) Diagnosis:       Cholecystitis      (Cholecystitis [K81.9])    Surgeons: Kenneth Guzman MD Responsible Provider: Jose Luis Argueta DO    Anesthesia Type: general ASA Status: 3            Anesthesia Type: No value filed.    Quincy Phase I: Quincy Score: 10    Quincy Phase II:      Anesthesia Post Evaluation    Patient location during evaluation: PACU  Level of consciousness: awake and alert  Airway patency: patent  Nausea & Vomiting: no nausea  Cardiovascular status: hemodynamically stable  Respiratory status: acceptable  Hydration status: euvolemic  Comments: Blood pressure (!) 98/56, pulse 81, temperature 98 °F (36.7 °C), temperature source Temporal, resp. rate 18, height 1.727 m (5' 7.99\"), weight 105.2 kg (232 lb), SpO2 97%.  Pain management: satisfactory to patient    No notable events documented.

## 2025-02-21 NOTE — OP NOTE
Procedure: ERCP with stent exchange and stone removal    Indication: Patient has history of CBD stones and stent placed previously.  Now presents with findings of multiple CBD stones on imaging    Date of Procedure: 2/21/2025    Patient profile: Refer to patient note in chart for documentation of history and physical    Providers: Kenneth Guzman MD    Referring MD: Dr. Pedersen    PCP: Unknown, Provider, ANP    Medicines: General Anesthesia    Complication: No immediate complications.     Estimated blood loss: Minimal    Procedure: After the risks including, but not limited to medication reaction, infection, bleeding, perforation, missed lesions, benefits and alternatives of the procedure were discussed with the patient and all questions were answered, informed consent was obtained. The duodenoscope was passed under direct vision throughout the procedure, the patient's blood pressure pulse and oxygen saturation were monitored continuously. The scope was introduced through the mouth and advanced to the second part of duodenum. The endoscopy was performed without difficulty. The patient tolerated the procedure well. The ERCP was performed with the patient in the prone position.    Findings:     films obtained prior to start the procedure was normal. The previously placed biliary stent was noted in position.    The duodenoscope  was introduced in the mouth advanced into the esophagus into the stomach, and into the level of the ampulla, where the previously placed biliary stent was noted. The stent was then removed through the scope using rat tooth forceps. This was successful. Next, a Rx 44 sphincterotome with 0.035 inch semi-stiff guidewire was introduced, and cannulation of the bile duct was attempted using the wire first cannulation technique. This was successful, and deep cannulation was achieved. The wire was advanced deep into the intrahepatic ducts, followed by the sphincterotome over the wire. A cholangiogram

## 2025-02-22 PROBLEM — K75.0 LIVER ABSCESS: Status: ACTIVE | Noted: 2025-02-22

## 2025-02-22 PROBLEM — K21.9 GERD (GASTROESOPHAGEAL REFLUX DISEASE): Status: ACTIVE | Noted: 2025-02-22

## 2025-02-22 LAB
ALBUMIN SERPL-MCNC: 1.9 G/DL (ref 3.5–5)
ALBUMIN/GLOB SERPL: 0.5 (ref 1–1.9)
ALP SERPL-CCNC: 93 U/L (ref 35–104)
ALT SERPL-CCNC: 5 U/L (ref 8–45)
ANION GAP SERPL CALC-SCNC: 6 MMOL/L (ref 7–16)
AST SERPL-CCNC: 16 U/L (ref 15–37)
BASOPHILS # BLD: 0 K/UL (ref 0–0.2)
BASOPHILS NFR BLD: 0 % (ref 0–2)
BILIRUB SERPL-MCNC: 0.5 MG/DL (ref 0–1.2)
BUN SERPL-MCNC: 6 MG/DL (ref 6–23)
CALCIUM SERPL-MCNC: 8.4 MG/DL (ref 8.8–10.2)
CHLORIDE SERPL-SCNC: 104 MMOL/L (ref 98–107)
CO2 SERPL-SCNC: 27 MMOL/L (ref 20–29)
CREAT SERPL-MCNC: 0.65 MG/DL (ref 0.6–1.1)
DIFFERENTIAL METHOD BLD: ABNORMAL
EOSINOPHIL # BLD: 0.01 K/UL (ref 0–0.8)
EOSINOPHIL NFR BLD: 0.3 % (ref 0.5–7.8)
ERYTHROCYTE [DISTWIDTH] IN BLOOD BY AUTOMATED COUNT: 13.4 % (ref 11.9–14.6)
GLOBULIN SER CALC-MCNC: 3.6 G/DL (ref 2.3–3.5)
GLUCOSE BLD STRIP.AUTO-MCNC: 122 MG/DL (ref 65–100)
GLUCOSE BLD STRIP.AUTO-MCNC: 132 MG/DL (ref 65–100)
GLUCOSE BLD STRIP.AUTO-MCNC: 174 MG/DL (ref 65–100)
GLUCOSE SERPL-MCNC: 129 MG/DL (ref 70–99)
HCT VFR BLD AUTO: 33.1 % (ref 35.8–46.3)
HGB BLD-MCNC: 11 G/DL (ref 11.7–15.4)
IMM GRANULOCYTES # BLD AUTO: 0.01 K/UL (ref 0–0.5)
IMM GRANULOCYTES NFR BLD AUTO: 0.3 % (ref 0–5)
LYMPHOCYTES # BLD: 0.83 K/UL (ref 0.5–4.6)
LYMPHOCYTES NFR BLD: 25.4 % (ref 13–44)
MCH RBC QN AUTO: 30.4 PG (ref 26.1–32.9)
MCHC RBC AUTO-ENTMCNC: 33.2 G/DL (ref 31.4–35)
MCV RBC AUTO: 91.4 FL (ref 82–102)
MONOCYTES # BLD: 0.24 K/UL (ref 0.1–1.3)
MONOCYTES NFR BLD: 7.3 % (ref 4–12)
NEUTS SEG # BLD: 2.18 K/UL (ref 1.7–8.2)
NEUTS SEG NFR BLD: 66.7 % (ref 43–78)
NRBC # BLD: 0 K/UL (ref 0–0.2)
PLATELET # BLD AUTO: 183 K/UL (ref 150–450)
PMV BLD AUTO: 9.6 FL (ref 9.4–12.3)
POTASSIUM SERPL-SCNC: 4.2 MMOL/L (ref 3.5–5.1)
PROT SERPL-MCNC: 5.5 G/DL (ref 6.3–8.2)
RBC # BLD AUTO: 3.62 M/UL (ref 4.05–5.2)
SERVICE CMNT-IMP: ABNORMAL
SODIUM SERPL-SCNC: 137 MMOL/L (ref 136–145)
WBC # BLD AUTO: 3.3 K/UL (ref 4.3–11.1)

## 2025-02-22 PROCEDURE — 82962 GLUCOSE BLOOD TEST: CPT

## 2025-02-22 PROCEDURE — 97116 GAIT TRAINING THERAPY: CPT

## 2025-02-22 PROCEDURE — 6370000000 HC RX 637 (ALT 250 FOR IP): Performed by: NURSE PRACTITIONER

## 2025-02-22 PROCEDURE — 80053 COMPREHEN METABOLIC PANEL: CPT

## 2025-02-22 PROCEDURE — 2500000003 HC RX 250 WO HCPCS: Performed by: STUDENT IN AN ORGANIZED HEALTH CARE EDUCATION/TRAINING PROGRAM

## 2025-02-22 PROCEDURE — 1100000000 HC RM PRIVATE

## 2025-02-22 PROCEDURE — 94640 AIRWAY INHALATION TREATMENT: CPT

## 2025-02-22 PROCEDURE — 6360000002 HC RX W HCPCS: Performed by: STUDENT IN AN ORGANIZED HEALTH CARE EDUCATION/TRAINING PROGRAM

## 2025-02-22 PROCEDURE — 6370000000 HC RX 637 (ALT 250 FOR IP): Performed by: STUDENT IN AN ORGANIZED HEALTH CARE EDUCATION/TRAINING PROGRAM

## 2025-02-22 PROCEDURE — 2580000003 HC RX 258: Performed by: STUDENT IN AN ORGANIZED HEALTH CARE EDUCATION/TRAINING PROGRAM

## 2025-02-22 PROCEDURE — 94761 N-INVAS EAR/PLS OXIMETRY MLT: CPT

## 2025-02-22 PROCEDURE — 94760 N-INVAS EAR/PLS OXIMETRY 1: CPT

## 2025-02-22 PROCEDURE — 6370000000 HC RX 637 (ALT 250 FOR IP): Performed by: INTERNAL MEDICINE

## 2025-02-22 PROCEDURE — 36415 COLL VENOUS BLD VENIPUNCTURE: CPT

## 2025-02-22 PROCEDURE — 85025 COMPLETE CBC W/AUTO DIFF WBC: CPT

## 2025-02-22 RX ORDER — PANTOPRAZOLE SODIUM 40 MG/1
40 TABLET, DELAYED RELEASE ORAL
Status: DISCONTINUED | OUTPATIENT
Start: 2025-02-22 | End: 2025-02-26 | Stop reason: HOSPADM

## 2025-02-22 RX ADMIN — TRAZODONE HYDROCHLORIDE 50 MG: 50 TABLET ORAL at 20:42

## 2025-02-22 RX ADMIN — PIPERACILLIN AND TAZOBACTAM 3375 MG: 3; .375 INJECTION, POWDER, LYOPHILIZED, FOR SOLUTION INTRAVENOUS at 16:50

## 2025-02-22 RX ADMIN — HYDROMORPHONE HYDROCHLORIDE 0.5 MG: 1 INJECTION, SOLUTION INTRAMUSCULAR; INTRAVENOUS; SUBCUTANEOUS at 22:50

## 2025-02-22 RX ADMIN — PIPERACILLIN AND TAZOBACTAM 3375 MG: 3; .375 INJECTION, POWDER, LYOPHILIZED, FOR SOLUTION INTRAVENOUS at 00:20

## 2025-02-22 RX ADMIN — GABAPENTIN 800 MG: 400 CAPSULE ORAL at 14:15

## 2025-02-22 RX ADMIN — PIPERACILLIN AND TAZOBACTAM 3375 MG: 3; .375 INJECTION, POWDER, LYOPHILIZED, FOR SOLUTION INTRAVENOUS at 22:55

## 2025-02-22 RX ADMIN — DULOXETINE HYDROCHLORIDE 60 MG: 60 CAPSULE, DELAYED RELEASE ORAL at 09:09

## 2025-02-22 RX ADMIN — INSULIN GLARGINE 35 UNITS: 100 INJECTION, SOLUTION SUBCUTANEOUS at 20:39

## 2025-02-22 RX ADMIN — HYDROMORPHONE HYDROCHLORIDE 0.5 MG: 1 INJECTION, SOLUTION INTRAMUSCULAR; INTRAVENOUS; SUBCUTANEOUS at 18:30

## 2025-02-22 RX ADMIN — HYDROMORPHONE HYDROCHLORIDE 0.5 MG: 1 INJECTION, SOLUTION INTRAMUSCULAR; INTRAVENOUS; SUBCUTANEOUS at 14:23

## 2025-02-22 RX ADMIN — ARFORMOTEROL TARTRATE: 15 SOLUTION RESPIRATORY (INHALATION) at 07:46

## 2025-02-22 RX ADMIN — SODIUM CHLORIDE, PRESERVATIVE FREE 20 ML: 5 INJECTION INTRAVENOUS at 20:40

## 2025-02-22 RX ADMIN — PANTOPRAZOLE SODIUM 40 MG: 40 TABLET, DELAYED RELEASE ORAL at 14:26

## 2025-02-22 RX ADMIN — MONTELUKAST 10 MG: 10 TABLET, FILM COATED ORAL at 20:42

## 2025-02-22 RX ADMIN — ONDANSETRON 8 MG: 2 INJECTION, SOLUTION INTRAMUSCULAR; INTRAVENOUS at 14:23

## 2025-02-22 RX ADMIN — ARFORMOTEROL TARTRATE: 15 SOLUTION RESPIRATORY (INHALATION) at 19:33

## 2025-02-22 RX ADMIN — INSULIN GLARGINE 35 UNITS: 100 INJECTION, SOLUTION SUBCUTANEOUS at 09:09

## 2025-02-22 RX ADMIN — ATORVASTATIN CALCIUM 10 MG: 10 TABLET, FILM COATED ORAL at 20:42

## 2025-02-22 RX ADMIN — HYDROMORPHONE HYDROCHLORIDE 0.5 MG: 1 INJECTION, SOLUTION INTRAMUSCULAR; INTRAVENOUS; SUBCUTANEOUS at 03:42

## 2025-02-22 RX ADMIN — TAMSULOSIN HYDROCHLORIDE 0.4 MG: 0.4 CAPSULE ORAL at 09:09

## 2025-02-22 RX ADMIN — INSULIN LISPRO 1 UNITS: 100 INJECTION, SOLUTION INTRAVENOUS; SUBCUTANEOUS at 20:40

## 2025-02-22 RX ADMIN — PIPERACILLIN AND TAZOBACTAM 3375 MG: 3; .375 INJECTION, POWDER, LYOPHILIZED, FOR SOLUTION INTRAVENOUS at 06:59

## 2025-02-22 RX ADMIN — GABAPENTIN 800 MG: 400 CAPSULE ORAL at 20:42

## 2025-02-22 RX ADMIN — FUROSEMIDE 20 MG: 20 TABLET ORAL at 09:09

## 2025-02-22 RX ADMIN — ONDANSETRON 8 MG: 2 INJECTION, SOLUTION INTRAMUSCULAR; INTRAVENOUS at 20:42

## 2025-02-22 RX ADMIN — GABAPENTIN 800 MG: 400 CAPSULE ORAL at 10:10

## 2025-02-22 ASSESSMENT — PAIN SCALES - GENERAL
PAINLEVEL_OUTOF10: 10
PAINLEVEL_OUTOF10: 10
PAINLEVEL_OUTOF10: 9
PAINLEVEL_OUTOF10: 0
PAINLEVEL_OUTOF10: 0
PAINLEVEL_OUTOF10: 10
PAINLEVEL_OUTOF10: 0

## 2025-02-22 ASSESSMENT — PAIN DESCRIPTION - DESCRIPTORS
DESCRIPTORS: ACHING
DESCRIPTORS: ACHING
DESCRIPTORS: ACHING;DISCOMFORT;SHARP
DESCRIPTORS: ACHING

## 2025-02-22 ASSESSMENT — PAIN DESCRIPTION - ORIENTATION
ORIENTATION: ANTERIOR
ORIENTATION: RIGHT;UPPER;MID

## 2025-02-22 ASSESSMENT — PAIN DESCRIPTION - LOCATION
LOCATION: ABDOMEN

## 2025-02-22 ASSESSMENT — PAIN - FUNCTIONAL ASSESSMENT: PAIN_FUNCTIONAL_ASSESSMENT: PREVENTS OR INTERFERES SOME ACTIVE ACTIVITIES AND ADLS

## 2025-02-23 ENCOUNTER — APPOINTMENT (OUTPATIENT)
Dept: CT IMAGING | Age: 60
DRG: 871 | End: 2025-02-23
Attending: INTERNAL MEDICINE
Payer: COMMERCIAL

## 2025-02-23 LAB
ALBUMIN SERPL-MCNC: 1.9 G/DL (ref 3.5–5)
ALBUMIN/GLOB SERPL: 0.5 (ref 1–1.9)
ALP SERPL-CCNC: 89 U/L (ref 35–104)
ALT SERPL-CCNC: 6 U/L (ref 8–45)
ANION GAP SERPL CALC-SCNC: 6 MMOL/L (ref 7–16)
AST SERPL-CCNC: 24 U/L (ref 15–37)
BASOPHILS # BLD: 0.01 K/UL (ref 0–0.2)
BASOPHILS NFR BLD: 0.3 % (ref 0–2)
BILIRUB SERPL-MCNC: 0.4 MG/DL (ref 0–1.2)
BUN SERPL-MCNC: 5 MG/DL (ref 6–23)
CALCIUM SERPL-MCNC: 8.4 MG/DL (ref 8.8–10.2)
CHLORIDE SERPL-SCNC: 105 MMOL/L (ref 98–107)
CO2 SERPL-SCNC: 29 MMOL/L (ref 20–29)
CREAT SERPL-MCNC: 0.85 MG/DL (ref 0.6–1.1)
DIFFERENTIAL METHOD BLD: ABNORMAL
EOSINOPHIL # BLD: 0.06 K/UL (ref 0–0.8)
EOSINOPHIL NFR BLD: 2 % (ref 0.5–7.8)
ERYTHROCYTE [DISTWIDTH] IN BLOOD BY AUTOMATED COUNT: 13.5 % (ref 11.9–14.6)
GLOBULIN SER CALC-MCNC: 3.7 G/DL (ref 2.3–3.5)
GLUCOSE BLD STRIP.AUTO-MCNC: 120 MG/DL (ref 65–100)
GLUCOSE BLD STRIP.AUTO-MCNC: 141 MG/DL (ref 65–100)
GLUCOSE BLD STRIP.AUTO-MCNC: 223 MG/DL (ref 65–100)
GLUCOSE BLD STRIP.AUTO-MCNC: 86 MG/DL (ref 65–100)
GLUCOSE SERPL-MCNC: 102 MG/DL (ref 70–99)
HCT VFR BLD AUTO: 34.3 % (ref 35.8–46.3)
HGB BLD-MCNC: 11.2 G/DL (ref 11.7–15.4)
IMM GRANULOCYTES # BLD AUTO: 0.01 K/UL (ref 0–0.5)
IMM GRANULOCYTES NFR BLD AUTO: 0.3 % (ref 0–5)
LYMPHOCYTES # BLD: 1.56 K/UL (ref 0.5–4.6)
LYMPHOCYTES NFR BLD: 51.7 % (ref 13–44)
MCH RBC QN AUTO: 30.6 PG (ref 26.1–32.9)
MCHC RBC AUTO-ENTMCNC: 32.7 G/DL (ref 31.4–35)
MCV RBC AUTO: 93.7 FL (ref 82–102)
MONOCYTES # BLD: 0.26 K/UL (ref 0.1–1.3)
MONOCYTES NFR BLD: 8.6 % (ref 4–12)
NEUTS SEG # BLD: 1.12 K/UL (ref 1.7–8.2)
NEUTS SEG NFR BLD: 37.1 % (ref 43–78)
NRBC # BLD: 0 K/UL (ref 0–0.2)
PLATELET # BLD AUTO: 169 K/UL (ref 150–450)
PMV BLD AUTO: 9.5 FL (ref 9.4–12.3)
POTASSIUM SERPL-SCNC: 3.9 MMOL/L (ref 3.5–5.1)
PROT SERPL-MCNC: 5.6 G/DL (ref 6.3–8.2)
RBC # BLD AUTO: 3.66 M/UL (ref 4.05–5.2)
SERVICE CMNT-IMP: ABNORMAL
SERVICE CMNT-IMP: NORMAL
SODIUM SERPL-SCNC: 141 MMOL/L (ref 136–145)
WBC # BLD AUTO: 3 K/UL (ref 4.3–11.1)

## 2025-02-23 PROCEDURE — 85025 COMPLETE CBC W/AUTO DIFF WBC: CPT

## 2025-02-23 PROCEDURE — 6360000002 HC RX W HCPCS: Performed by: RADIOLOGY

## 2025-02-23 PROCEDURE — 0W9G30Z DRAINAGE OF PERITONEAL CAVITY WITH DRAINAGE DEVICE, PERCUTANEOUS APPROACH: ICD-10-PCS | Performed by: RADIOLOGY

## 2025-02-23 PROCEDURE — C1729 CATH, DRAINAGE: HCPCS

## 2025-02-23 PROCEDURE — 87088 URINE BACTERIA CULTURE: CPT

## 2025-02-23 PROCEDURE — 2500000003 HC RX 250 WO HCPCS: Performed by: STUDENT IN AN ORGANIZED HEALTH CARE EDUCATION/TRAINING PROGRAM

## 2025-02-23 PROCEDURE — 1100000000 HC RM PRIVATE

## 2025-02-23 PROCEDURE — 94640 AIRWAY INHALATION TREATMENT: CPT

## 2025-02-23 PROCEDURE — 99153 MOD SED SAME PHYS/QHP EA: CPT

## 2025-02-23 PROCEDURE — 2580000003 HC RX 258: Performed by: STUDENT IN AN ORGANIZED HEALTH CARE EDUCATION/TRAINING PROGRAM

## 2025-02-23 PROCEDURE — 6370000000 HC RX 637 (ALT 250 FOR IP): Performed by: NURSE PRACTITIONER

## 2025-02-23 PROCEDURE — 82962 GLUCOSE BLOOD TEST: CPT

## 2025-02-23 PROCEDURE — 87186 SC STD MICRODIL/AGAR DIL: CPT

## 2025-02-23 PROCEDURE — 80053 COMPREHEN METABOLIC PANEL: CPT

## 2025-02-23 PROCEDURE — 36415 COLL VENOUS BLD VENIPUNCTURE: CPT

## 2025-02-23 PROCEDURE — 6360000002 HC RX W HCPCS: Performed by: STUDENT IN AN ORGANIZED HEALTH CARE EDUCATION/TRAINING PROGRAM

## 2025-02-23 PROCEDURE — 87070 CULTURE OTHR SPECIMN AEROBIC: CPT

## 2025-02-23 PROCEDURE — 87205 SMEAR GRAM STAIN: CPT

## 2025-02-23 PROCEDURE — 6370000000 HC RX 637 (ALT 250 FOR IP): Performed by: INTERNAL MEDICINE

## 2025-02-23 PROCEDURE — 6370000000 HC RX 637 (ALT 250 FOR IP): Performed by: STUDENT IN AN ORGANIZED HEALTH CARE EDUCATION/TRAINING PROGRAM

## 2025-02-23 PROCEDURE — 94761 N-INVAS EAR/PLS OXIMETRY MLT: CPT

## 2025-02-23 RX ORDER — LIDOCAINE HYDROCHLORIDE 10 MG/ML
INJECTION, SOLUTION EPIDURAL; INFILTRATION; INTRACAUDAL; PERINEURAL PRN
Status: COMPLETED | OUTPATIENT
Start: 2025-02-23 | End: 2025-02-23

## 2025-02-23 RX ORDER — FENTANYL CITRATE 50 UG/ML
INJECTION, SOLUTION INTRAMUSCULAR; INTRAVENOUS PRN
Status: COMPLETED | OUTPATIENT
Start: 2025-02-23 | End: 2025-02-23

## 2025-02-23 RX ORDER — MIDAZOLAM HYDROCHLORIDE 1 MG/ML
INJECTION, SOLUTION INTRAMUSCULAR; INTRAVENOUS PRN
Status: COMPLETED | OUTPATIENT
Start: 2025-02-23 | End: 2025-02-23

## 2025-02-23 RX ADMIN — PIPERACILLIN AND TAZOBACTAM 3375 MG: 3; .375 INJECTION, POWDER, LYOPHILIZED, FOR SOLUTION INTRAVENOUS at 06:44

## 2025-02-23 RX ADMIN — MIDAZOLAM 1 MG: 1 INJECTION INTRAMUSCULAR; INTRAVENOUS at 10:07

## 2025-02-23 RX ADMIN — ATORVASTATIN CALCIUM 10 MG: 10 TABLET, FILM COATED ORAL at 20:58

## 2025-02-23 RX ADMIN — INSULIN GLARGINE 35 UNITS: 100 INJECTION, SOLUTION SUBCUTANEOUS at 21:00

## 2025-02-23 RX ADMIN — PIPERACILLIN AND TAZOBACTAM 3375 MG: 3; .375 INJECTION, POWDER, LYOPHILIZED, FOR SOLUTION INTRAVENOUS at 15:16

## 2025-02-23 RX ADMIN — ARFORMOTEROL TARTRATE: 15 SOLUTION RESPIRATORY (INHALATION) at 20:45

## 2025-02-23 RX ADMIN — MONTELUKAST 10 MG: 10 TABLET, FILM COATED ORAL at 20:58

## 2025-02-23 RX ADMIN — INSULIN LISPRO 1 UNITS: 100 INJECTION, SOLUTION INTRAVENOUS; SUBCUTANEOUS at 21:00

## 2025-02-23 RX ADMIN — MIDAZOLAM 1 MG: 1 INJECTION INTRAMUSCULAR; INTRAVENOUS at 10:02

## 2025-02-23 RX ADMIN — GABAPENTIN 800 MG: 400 CAPSULE ORAL at 20:58

## 2025-02-23 RX ADMIN — FENTANYL CITRATE 50 MCG: 50 INJECTION, SOLUTION INTRAMUSCULAR; INTRAVENOUS at 10:35

## 2025-02-23 RX ADMIN — MIDAZOLAM 1 MG: 1 INJECTION INTRAMUSCULAR; INTRAVENOUS at 10:28

## 2025-02-23 RX ADMIN — TRAZODONE HYDROCHLORIDE 50 MG: 50 TABLET ORAL at 20:58

## 2025-02-23 RX ADMIN — HYDROMORPHONE HYDROCHLORIDE 0.5 MG: 1 INJECTION, SOLUTION INTRAMUSCULAR; INTRAVENOUS; SUBCUTANEOUS at 06:49

## 2025-02-23 RX ADMIN — ACETAMINOPHEN 650 MG: 325 TABLET ORAL at 15:11

## 2025-02-23 RX ADMIN — PIPERACILLIN AND TAZOBACTAM 3375 MG: 3; .375 INJECTION, POWDER, LYOPHILIZED, FOR SOLUTION INTRAVENOUS at 23:07

## 2025-02-23 RX ADMIN — MIDAZOLAM 1 MG: 1 INJECTION INTRAMUSCULAR; INTRAVENOUS at 10:35

## 2025-02-23 RX ADMIN — GABAPENTIN 800 MG: 400 CAPSULE ORAL at 15:12

## 2025-02-23 RX ADMIN — FENTANYL CITRATE 50 MCG: 50 INJECTION, SOLUTION INTRAMUSCULAR; INTRAVENOUS at 10:01

## 2025-02-23 RX ADMIN — PANTOPRAZOLE SODIUM 40 MG: 40 TABLET, DELAYED RELEASE ORAL at 06:41

## 2025-02-23 RX ADMIN — ONDANSETRON 8 MG: 2 INJECTION, SOLUTION INTRAMUSCULAR; INTRAVENOUS at 22:55

## 2025-02-23 RX ADMIN — FENTANYL CITRATE 50 MCG: 50 INJECTION, SOLUTION INTRAMUSCULAR; INTRAVENOUS at 10:07

## 2025-02-23 RX ADMIN — FENTANYL CITRATE 50 MCG: 50 INJECTION, SOLUTION INTRAMUSCULAR; INTRAVENOUS at 10:28

## 2025-02-23 RX ADMIN — HYDROMORPHONE HYDROCHLORIDE 0.5 MG: 1 INJECTION, SOLUTION INTRAMUSCULAR; INTRAVENOUS; SUBCUTANEOUS at 17:03

## 2025-02-23 RX ADMIN — HYDROMORPHONE HYDROCHLORIDE 0.5 MG: 1 INJECTION, SOLUTION INTRAMUSCULAR; INTRAVENOUS; SUBCUTANEOUS at 12:28

## 2025-02-23 RX ADMIN — LIDOCAINE HYDROCHLORIDE 10 ML: 10 INJECTION, SOLUTION EPIDURAL; INFILTRATION; INTRACAUDAL; PERINEURAL at 10:10

## 2025-02-23 RX ADMIN — HYDROMORPHONE HYDROCHLORIDE 0.5 MG: 1 INJECTION, SOLUTION INTRAMUSCULAR; INTRAVENOUS; SUBCUTANEOUS at 22:56

## 2025-02-23 RX ADMIN — ONDANSETRON 8 MG: 2 INJECTION, SOLUTION INTRAMUSCULAR; INTRAVENOUS at 06:49

## 2025-02-23 RX ADMIN — SODIUM CHLORIDE, PRESERVATIVE FREE 20 ML: 5 INJECTION INTRAVENOUS at 20:58

## 2025-02-23 RX ADMIN — ARFORMOTEROL TARTRATE: 15 SOLUTION RESPIRATORY (INHALATION) at 08:39

## 2025-02-23 RX ADMIN — LIDOCAINE HYDROCHLORIDE 10 ML: 10 INJECTION, SOLUTION EPIDURAL; INFILTRATION; INTRACAUDAL; PERINEURAL at 10:30

## 2025-02-23 ASSESSMENT — PAIN SCALES - GENERAL
PAINLEVEL_OUTOF10: 10
PAINLEVEL_OUTOF10: 0
PAINLEVEL_OUTOF10: 10

## 2025-02-23 ASSESSMENT — PAIN - FUNCTIONAL ASSESSMENT
PAIN_FUNCTIONAL_ASSESSMENT: PREVENTS OR INTERFERES SOME ACTIVE ACTIVITIES AND ADLS
PAIN_FUNCTIONAL_ASSESSMENT: PREVENTS OR INTERFERES SOME ACTIVE ACTIVITIES AND ADLS

## 2025-02-23 ASSESSMENT — PAIN DESCRIPTION - DESCRIPTORS
DESCRIPTORS: ACHING
DESCRIPTORS: ACHING

## 2025-02-23 ASSESSMENT — PAIN DESCRIPTION - ORIENTATION
ORIENTATION: ANTERIOR
ORIENTATION: LEFT

## 2025-02-23 ASSESSMENT — PAIN DESCRIPTION - LOCATION
LOCATION: ABDOMEN
LOCATION: ABDOMEN

## 2025-02-23 NOTE — PRE SEDATION
Assessment:  Mallampati Class III - (soft palate & base of uvula are visible)    Prior History of Anesthesia Complications:   none    ASA Classification:  Class 3 - A patient with severe systemic disease that limits activity but is not incapacitating    Sedation/ Anesthesia Plan:   intravenous sedation    Medications Planned:   midazolam (Versed) intravenously and fentanyl intravenously    Patient is an appropriate candidate for plan of sedation: yes    Electronically signed by David Bah MD on 2/23/2025 at 9:17 AM

## 2025-02-23 NOTE — BRIEF OP NOTE
Brief Postoperative Note      Patient: Miguel A Zambrano  YOB: 1965  MRN: [id number]    Date of Procedure: 2/23/2025    Pre-Op Diagnosis: Alexa-cholecystic abscess    Post-Op Diagnosis: Same       : Olvin    CT guided drain placement x 2 for acute on chronic cholecystitis. 10 Fr and 8 Fr APDs placed. GB wall difficult to differentiate from abscess due to rupture. Approximately 4 mL of purulent fluid drained.     Flush drains with 5 mL NS once a day    Anesthesia: Moderate sedation    Estimated Blood Loss (mL): Minimal    Complications: None    Specimens:   * No specimens in log *    Implants:  * No implants in log *      Drains:   Closed/Suction Drain Lateral RUQ Bulb (Active)       Closed/Suction Drain Medial RUQ Bulb (Active)       Findings:  Acute on chronic cholecystitis with alexa-cholecystic abscess involving the adjacent liver.     Electronically signed by David Bah MD on 2/23/2025 at 10:42 AM

## 2025-02-24 LAB
ALBUMIN SERPL-MCNC: 2 G/DL (ref 3.5–5)
ALBUMIN/GLOB SERPL: 0.5 (ref 1–1.9)
ALP SERPL-CCNC: 104 U/L (ref 35–104)
ALT SERPL-CCNC: 6 U/L (ref 8–45)
ANION GAP SERPL CALC-SCNC: 6 MMOL/L (ref 7–16)
AST SERPL-CCNC: 18 U/L (ref 15–37)
BASOPHILS # BLD: 0.01 K/UL (ref 0–0.2)
BASOPHILS NFR BLD: 0.2 % (ref 0–2)
BILIRUB SERPL-MCNC: 0.5 MG/DL (ref 0–1.2)
BUN SERPL-MCNC: 6 MG/DL (ref 6–23)
CALCIUM SERPL-MCNC: 8.5 MG/DL (ref 8.8–10.2)
CHLORIDE SERPL-SCNC: 103 MMOL/L (ref 98–107)
CO2 SERPL-SCNC: 29 MMOL/L (ref 20–29)
CREAT SERPL-MCNC: 0.84 MG/DL (ref 0.6–1.1)
DIFFERENTIAL METHOD BLD: ABNORMAL
EOSINOPHIL # BLD: 0.15 K/UL (ref 0–0.8)
EOSINOPHIL NFR BLD: 3.3 % (ref 0.5–7.8)
ERYTHROCYTE [DISTWIDTH] IN BLOOD BY AUTOMATED COUNT: 13.7 % (ref 11.9–14.6)
GLOBULIN SER CALC-MCNC: 4 G/DL (ref 2.3–3.5)
GLUCOSE BLD STRIP.AUTO-MCNC: 140 MG/DL (ref 65–100)
GLUCOSE BLD STRIP.AUTO-MCNC: 155 MG/DL (ref 65–100)
GLUCOSE BLD STRIP.AUTO-MCNC: 159 MG/DL (ref 65–100)
GLUCOSE BLD STRIP.AUTO-MCNC: 181 MG/DL (ref 65–100)
GLUCOSE BLD STRIP.AUTO-MCNC: 188 MG/DL (ref 65–100)
GLUCOSE SERPL-MCNC: 136 MG/DL (ref 70–99)
HCT VFR BLD AUTO: 37.8 % (ref 35.8–46.3)
HGB BLD-MCNC: 12.2 G/DL (ref 11.7–15.4)
IMM GRANULOCYTES # BLD AUTO: 0.03 K/UL (ref 0–0.5)
IMM GRANULOCYTES NFR BLD AUTO: 0.7 % (ref 0–5)
LYMPHOCYTES # BLD: 1.32 K/UL (ref 0.5–4.6)
LYMPHOCYTES NFR BLD: 28.9 % (ref 13–44)
MCH RBC QN AUTO: 30.6 PG (ref 26.1–32.9)
MCHC RBC AUTO-ENTMCNC: 32.3 G/DL (ref 31.4–35)
MCV RBC AUTO: 94.7 FL (ref 82–102)
MONOCYTES # BLD: 0.53 K/UL (ref 0.1–1.3)
MONOCYTES NFR BLD: 11.6 % (ref 4–12)
NEUTS SEG # BLD: 2.53 K/UL (ref 1.7–8.2)
NEUTS SEG NFR BLD: 55.3 % (ref 43–78)
NRBC # BLD: 0 K/UL (ref 0–0.2)
PLATELET # BLD AUTO: 221 K/UL (ref 150–450)
PMV BLD AUTO: 9.3 FL (ref 9.4–12.3)
POTASSIUM SERPL-SCNC: 4 MMOL/L (ref 3.5–5.1)
PROT SERPL-MCNC: 5.9 G/DL (ref 6.3–8.2)
RBC # BLD AUTO: 3.99 M/UL (ref 4.05–5.2)
SERVICE CMNT-IMP: ABNORMAL
SODIUM SERPL-SCNC: 138 MMOL/L (ref 136–145)
WBC # BLD AUTO: 4.6 K/UL (ref 4.3–11.1)

## 2025-02-24 PROCEDURE — 1100000000 HC RM PRIVATE

## 2025-02-24 PROCEDURE — 2580000003 HC RX 258: Performed by: STUDENT IN AN ORGANIZED HEALTH CARE EDUCATION/TRAINING PROGRAM

## 2025-02-24 PROCEDURE — 82962 GLUCOSE BLOOD TEST: CPT

## 2025-02-24 PROCEDURE — 6370000000 HC RX 637 (ALT 250 FOR IP): Performed by: STUDENT IN AN ORGANIZED HEALTH CARE EDUCATION/TRAINING PROGRAM

## 2025-02-24 PROCEDURE — 80053 COMPREHEN METABOLIC PANEL: CPT

## 2025-02-24 PROCEDURE — 36415 COLL VENOUS BLD VENIPUNCTURE: CPT

## 2025-02-24 PROCEDURE — 6360000002 HC RX W HCPCS: Performed by: STUDENT IN AN ORGANIZED HEALTH CARE EDUCATION/TRAINING PROGRAM

## 2025-02-24 PROCEDURE — 6370000000 HC RX 637 (ALT 250 FOR IP): Performed by: NURSE PRACTITIONER

## 2025-02-24 PROCEDURE — 94761 N-INVAS EAR/PLS OXIMETRY MLT: CPT

## 2025-02-24 PROCEDURE — 2500000003 HC RX 250 WO HCPCS: Performed by: STUDENT IN AN ORGANIZED HEALTH CARE EDUCATION/TRAINING PROGRAM

## 2025-02-24 PROCEDURE — 2500000003 HC RX 250 WO HCPCS: Performed by: ANESTHESIOLOGY

## 2025-02-24 PROCEDURE — 94640 AIRWAY INHALATION TREATMENT: CPT

## 2025-02-24 PROCEDURE — 6370000000 HC RX 637 (ALT 250 FOR IP): Performed by: INTERNAL MEDICINE

## 2025-02-24 PROCEDURE — 85025 COMPLETE CBC W/AUTO DIFF WBC: CPT

## 2025-02-24 RX ORDER — MENTHOL/CAMPHOR/ALLANTOIN/PHE 0.6-0.5-1%
OINTMENT(EA) TOPICAL PRN
Status: DISCONTINUED | OUTPATIENT
Start: 2025-02-24 | End: 2025-02-26 | Stop reason: HOSPADM

## 2025-02-24 RX ORDER — OXYCODONE AND ACETAMINOPHEN 5; 325 MG/1; MG/1
1 TABLET ORAL EVERY 6 HOURS PRN
Status: DISCONTINUED | OUTPATIENT
Start: 2025-02-24 | End: 2025-02-26 | Stop reason: HOSPADM

## 2025-02-24 RX ADMIN — HYDROMORPHONE HYDROCHLORIDE 0.5 MG: 1 INJECTION, SOLUTION INTRAMUSCULAR; INTRAVENOUS; SUBCUTANEOUS at 06:16

## 2025-02-24 RX ADMIN — TRAZODONE HYDROCHLORIDE 50 MG: 50 TABLET ORAL at 20:46

## 2025-02-24 RX ADMIN — HYDROMORPHONE HYDROCHLORIDE 0.5 MG: 1 INJECTION, SOLUTION INTRAMUSCULAR; INTRAVENOUS; SUBCUTANEOUS at 10:21

## 2025-02-24 RX ADMIN — HYDROMORPHONE HYDROCHLORIDE 0.5 MG: 1 INJECTION, SOLUTION INTRAMUSCULAR; INTRAVENOUS; SUBCUTANEOUS at 15:06

## 2025-02-24 RX ADMIN — INSULIN GLARGINE 35 UNITS: 100 INJECTION, SOLUTION SUBCUTANEOUS at 21:30

## 2025-02-24 RX ADMIN — PIPERACILLIN AND TAZOBACTAM 3375 MG: 3; .375 INJECTION, POWDER, LYOPHILIZED, FOR SOLUTION INTRAVENOUS at 15:15

## 2025-02-24 RX ADMIN — PANTOPRAZOLE SODIUM 40 MG: 40 TABLET, DELAYED RELEASE ORAL at 06:16

## 2025-02-24 RX ADMIN — ARFORMOTEROL TARTRATE: 15 SOLUTION RESPIRATORY (INHALATION) at 07:30

## 2025-02-24 RX ADMIN — DULOXETINE HYDROCHLORIDE 60 MG: 60 CAPSULE, DELAYED RELEASE ORAL at 09:38

## 2025-02-24 RX ADMIN — SODIUM CHLORIDE, PRESERVATIVE FREE 10 ML: 5 INJECTION INTRAVENOUS at 20:50

## 2025-02-24 RX ADMIN — GABAPENTIN 800 MG: 400 CAPSULE ORAL at 09:37

## 2025-02-24 RX ADMIN — MONTELUKAST 10 MG: 10 TABLET, FILM COATED ORAL at 20:46

## 2025-02-24 RX ADMIN — ATORVASTATIN CALCIUM 10 MG: 10 TABLET, FILM COATED ORAL at 20:46

## 2025-02-24 RX ADMIN — INSULIN GLARGINE 35 UNITS: 100 INJECTION, SOLUTION SUBCUTANEOUS at 09:36

## 2025-02-24 RX ADMIN — GABAPENTIN 800 MG: 400 CAPSULE ORAL at 15:07

## 2025-02-24 RX ADMIN — ARFORMOTEROL TARTRATE: 15 SOLUTION RESPIRATORY (INHALATION) at 21:47

## 2025-02-24 RX ADMIN — ONDANSETRON 8 MG: 2 INJECTION, SOLUTION INTRAMUSCULAR; INTRAVENOUS at 06:16

## 2025-02-24 RX ADMIN — INSULIN LISPRO 1 UNITS: 100 INJECTION, SOLUTION INTRAVENOUS; SUBCUTANEOUS at 17:29

## 2025-02-24 RX ADMIN — TAMSULOSIN HYDROCHLORIDE 0.4 MG: 0.4 CAPSULE ORAL at 09:39

## 2025-02-24 RX ADMIN — PIPERACILLIN AND TAZOBACTAM 3375 MG: 3; .375 INJECTION, POWDER, LYOPHILIZED, FOR SOLUTION INTRAVENOUS at 06:23

## 2025-02-24 RX ADMIN — SODIUM CHLORIDE, PRESERVATIVE FREE 10 ML: 5 INJECTION INTRAVENOUS at 09:39

## 2025-02-24 RX ADMIN — FUROSEMIDE 20 MG: 20 TABLET ORAL at 09:38

## 2025-02-24 RX ADMIN — PIPERACILLIN AND TAZOBACTAM 3375 MG: 3; .375 INJECTION, POWDER, LYOPHILIZED, FOR SOLUTION INTRAVENOUS at 22:35

## 2025-02-24 RX ADMIN — GABAPENTIN 800 MG: 400 CAPSULE ORAL at 22:30

## 2025-02-24 RX ADMIN — HYDROMORPHONE HYDROCHLORIDE 0.5 MG: 1 INJECTION, SOLUTION INTRAMUSCULAR; INTRAVENOUS; SUBCUTANEOUS at 21:12

## 2025-02-24 ASSESSMENT — PAIN DESCRIPTION - ORIENTATION
ORIENTATION: RIGHT

## 2025-02-24 ASSESSMENT — PAIN SCALES - GENERAL
PAINLEVEL_OUTOF10: 10
PAINLEVEL_OUTOF10: 0
PAINLEVEL_OUTOF10: 10
PAINLEVEL_OUTOF10: 0
PAINLEVEL_OUTOF10: 9
PAINLEVEL_OUTOF10: 10
PAINLEVEL_OUTOF10: 7
PAINLEVEL_OUTOF10: 6
PAINLEVEL_OUTOF10: 0

## 2025-02-24 ASSESSMENT — PAIN - FUNCTIONAL ASSESSMENT
PAIN_FUNCTIONAL_ASSESSMENT: ACTIVITIES ARE NOT PREVENTED
PAIN_FUNCTIONAL_ASSESSMENT: ACTIVITIES ARE NOT PREVENTED
PAIN_FUNCTIONAL_ASSESSMENT: PREVENTS OR INTERFERES SOME ACTIVE ACTIVITIES AND ADLS

## 2025-02-24 ASSESSMENT — PAIN DESCRIPTION - LOCATION
LOCATION: ABDOMEN
LOCATION: ABDOMEN
LOCATION: BREAST;RIB CAGE
LOCATION: ABDOMEN

## 2025-02-24 ASSESSMENT — PAIN DESCRIPTION - DESCRIPTORS
DESCRIPTORS: PENETRATING
DESCRIPTORS: ACHING
DESCRIPTORS: STABBING

## 2025-02-24 NOTE — PLAN OF CARE
Problem: Respiratory - Adult  Goal: Achieves optimal ventilation and oxygenation  Outcome: Progressing  Flowsheets (Taken 2/24/2025 7893)  Achieves optimal ventilation and oxygenation:   Assess for changes in respiratory status   Position to facilitate oxygenation and minimize respiratory effort   Respiratory therapy support as indicated   Assess for changes in mentation and behavior   Encourage broncho-pulmonary hygiene including cough, deep breathe, incentive spirometry   Assess and instruct to report shortness of breath or any respiratory difficulty

## 2025-02-24 NOTE — PLAN OF CARE
Problem: Chronic Conditions and Co-morbidities  Goal: Patient's chronic conditions and co-morbidity symptoms are monitored and maintained or improved  2/24/2025 0727 by Gris Villegas RN  Outcome: Progressing  2/23/2025 2228 by Ирина Clark RN  Outcome: Progressing     Problem: Discharge Planning  Goal: Discharge to home or other facility with appropriate resources  2/24/2025 0727 by Gris Villegas RN  Outcome: Progressing  2/23/2025 2228 by Ирина Clark RN  Outcome: Progressing     Problem: Safety - Adult  Goal: Free from fall injury  2/23/2025 2228 by Ирина Clark, RN  Outcome: Progressing

## 2025-02-24 NOTE — CARE COORDINATION
Chart reviewed by RNCM and discssed in IDR.    CM following for continued stay.    Discharge plan remains for patient to return home with [facility] at discharge. Please consult case management if any new needs arise.

## 2025-02-25 LAB
GLUCOSE BLD STRIP.AUTO-MCNC: 112 MG/DL (ref 65–100)
GLUCOSE BLD STRIP.AUTO-MCNC: 150 MG/DL (ref 65–100)
GLUCOSE BLD STRIP.AUTO-MCNC: 185 MG/DL (ref 65–100)
GLUCOSE BLD STRIP.AUTO-MCNC: 195 MG/DL (ref 65–100)
SERVICE CMNT-IMP: ABNORMAL

## 2025-02-25 PROCEDURE — 2500000003 HC RX 250 WO HCPCS: Performed by: STUDENT IN AN ORGANIZED HEALTH CARE EDUCATION/TRAINING PROGRAM

## 2025-02-25 PROCEDURE — 1100000000 HC RM PRIVATE

## 2025-02-25 PROCEDURE — 6370000000 HC RX 637 (ALT 250 FOR IP): Performed by: NURSE PRACTITIONER

## 2025-02-25 PROCEDURE — 2580000003 HC RX 258: Performed by: HOSPITALIST

## 2025-02-25 PROCEDURE — 6370000000 HC RX 637 (ALT 250 FOR IP): Performed by: STUDENT IN AN ORGANIZED HEALTH CARE EDUCATION/TRAINING PROGRAM

## 2025-02-25 PROCEDURE — 82962 GLUCOSE BLOOD TEST: CPT

## 2025-02-25 PROCEDURE — 6360000002 HC RX W HCPCS: Performed by: STUDENT IN AN ORGANIZED HEALTH CARE EDUCATION/TRAINING PROGRAM

## 2025-02-25 PROCEDURE — 94760 N-INVAS EAR/PLS OXIMETRY 1: CPT

## 2025-02-25 PROCEDURE — 6360000002 HC RX W HCPCS: Performed by: HOSPITALIST

## 2025-02-25 PROCEDURE — 94761 N-INVAS EAR/PLS OXIMETRY MLT: CPT

## 2025-02-25 PROCEDURE — 2580000003 HC RX 258: Performed by: STUDENT IN AN ORGANIZED HEALTH CARE EDUCATION/TRAINING PROGRAM

## 2025-02-25 PROCEDURE — 6370000000 HC RX 637 (ALT 250 FOR IP): Performed by: INTERNAL MEDICINE

## 2025-02-25 PROCEDURE — 97530 THERAPEUTIC ACTIVITIES: CPT

## 2025-02-25 PROCEDURE — 94640 AIRWAY INHALATION TREATMENT: CPT

## 2025-02-25 RX ADMIN — TAMSULOSIN HYDROCHLORIDE 0.4 MG: 0.4 CAPSULE ORAL at 08:53

## 2025-02-25 RX ADMIN — HYDROMORPHONE HYDROCHLORIDE 0.5 MG: 1 INJECTION, SOLUTION INTRAMUSCULAR; INTRAVENOUS; SUBCUTANEOUS at 10:03

## 2025-02-25 RX ADMIN — GABAPENTIN 800 MG: 400 CAPSULE ORAL at 15:27

## 2025-02-25 RX ADMIN — GABAPENTIN 800 MG: 400 CAPSULE ORAL at 21:07

## 2025-02-25 RX ADMIN — ARFORMOTEROL TARTRATE: 15 SOLUTION RESPIRATORY (INHALATION) at 20:19

## 2025-02-25 RX ADMIN — HYDROMORPHONE HYDROCHLORIDE 0.5 MG: 1 INJECTION, SOLUTION INTRAMUSCULAR; INTRAVENOUS; SUBCUTANEOUS at 17:36

## 2025-02-25 RX ADMIN — INSULIN GLARGINE 35 UNITS: 100 INJECTION, SOLUTION SUBCUTANEOUS at 21:07

## 2025-02-25 RX ADMIN — SODIUM CHLORIDE, PRESERVATIVE FREE 10 ML: 5 INJECTION INTRAVENOUS at 21:14

## 2025-02-25 RX ADMIN — INSULIN LISPRO 1 UNITS: 100 INJECTION, SOLUTION INTRAVENOUS; SUBCUTANEOUS at 12:19

## 2025-02-25 RX ADMIN — PANTOPRAZOLE SODIUM 40 MG: 40 TABLET, DELAYED RELEASE ORAL at 06:08

## 2025-02-25 RX ADMIN — PIPERACILLIN AND TAZOBACTAM 3375 MG: 3; .375 INJECTION, POWDER, LYOPHILIZED, FOR SOLUTION INTRAVENOUS at 17:43

## 2025-02-25 RX ADMIN — INSULIN GLARGINE 35 UNITS: 100 INJECTION, SOLUTION SUBCUTANEOUS at 08:52

## 2025-02-25 RX ADMIN — HYDROMORPHONE HYDROCHLORIDE 0.5 MG: 1 INJECTION, SOLUTION INTRAMUSCULAR; INTRAVENOUS; SUBCUTANEOUS at 04:21

## 2025-02-25 RX ADMIN — SODIUM CHLORIDE, PRESERVATIVE FREE 10 ML: 5 INJECTION INTRAVENOUS at 08:53

## 2025-02-25 RX ADMIN — FUROSEMIDE 20 MG: 20 TABLET ORAL at 08:53

## 2025-02-25 RX ADMIN — GABAPENTIN 800 MG: 400 CAPSULE ORAL at 08:53

## 2025-02-25 RX ADMIN — PIPERACILLIN AND TAZOBACTAM 3375 MG: 3; .375 INJECTION, POWDER, LYOPHILIZED, FOR SOLUTION INTRAVENOUS at 06:20

## 2025-02-25 RX ADMIN — TRAZODONE HYDROCHLORIDE 50 MG: 50 TABLET ORAL at 21:07

## 2025-02-25 RX ADMIN — HYDROMORPHONE HYDROCHLORIDE 0.5 MG: 1 INJECTION, SOLUTION INTRAMUSCULAR; INTRAVENOUS; SUBCUTANEOUS at 21:39

## 2025-02-25 RX ADMIN — ARFORMOTEROL TARTRATE: 15 SOLUTION RESPIRATORY (INHALATION) at 07:23

## 2025-02-25 RX ADMIN — MONTELUKAST 10 MG: 10 TABLET, FILM COATED ORAL at 21:07

## 2025-02-25 RX ADMIN — ATORVASTATIN CALCIUM 10 MG: 10 TABLET, FILM COATED ORAL at 21:07

## 2025-02-25 RX ADMIN — INSULIN LISPRO 1 UNITS: 100 INJECTION, SOLUTION INTRAVENOUS; SUBCUTANEOUS at 17:35

## 2025-02-25 RX ADMIN — DULOXETINE HYDROCHLORIDE 60 MG: 60 CAPSULE, DELAYED RELEASE ORAL at 08:53

## 2025-02-25 ASSESSMENT — PAIN DESCRIPTION - DESCRIPTORS
DESCRIPTORS: DISCOMFORT;SHARP
DESCRIPTORS: ACHING;PENETRATING
DESCRIPTORS: STABBING
DESCRIPTORS: STABBING

## 2025-02-25 ASSESSMENT — PAIN SCALES - GENERAL
PAINLEVEL_OUTOF10: 10
PAINLEVEL_OUTOF10: 0
PAINLEVEL_OUTOF10: 7
PAINLEVEL_OUTOF10: 10
PAINLEVEL_OUTOF10: 0
PAINLEVEL_OUTOF10: 9
PAINLEVEL_OUTOF10: 0
PAINLEVEL_OUTOF10: 6

## 2025-02-25 ASSESSMENT — PAIN DESCRIPTION - ORIENTATION
ORIENTATION: RIGHT

## 2025-02-25 ASSESSMENT — PAIN DESCRIPTION - LOCATION
LOCATION: RIB CAGE
LOCATION: ABDOMEN
LOCATION: ABDOMEN
LOCATION: RIB CAGE

## 2025-02-25 ASSESSMENT — PAIN - FUNCTIONAL ASSESSMENT
PAIN_FUNCTIONAL_ASSESSMENT: ACTIVITIES ARE NOT PREVENTED
PAIN_FUNCTIONAL_ASSESSMENT: ACTIVITIES ARE NOT PREVENTED

## 2025-02-25 NOTE — PLAN OF CARE
Problem: Respiratory - Adult  Goal: Achieves optimal ventilation and oxygenation  2/25/2025 0725 by Juan F Freed RCLORNA  Outcome: Progressing  Flowsheets (Taken 2/25/2025 0725)  Achieves optimal ventilation and oxygenation:   Assess for changes in respiratory status   Position to facilitate oxygenation and minimize respiratory effort   Respiratory therapy support as indicated   Assess for changes in mentation and behavior   Encourage broncho-pulmonary hygiene including cough, deep breathe, incentive spirometry   Assess and instruct to report shortness of breath or any respiratory difficulty  2/25/2025 0029 by Stephanie Garcia, RN  Outcome: Progressing

## 2025-02-25 NOTE — CONSULTS
Department of Interventional Radiology  [phone]        Consult Note     Patient: Miguel A Zambrano MRN: [id number]  SSN: [id number]    YOB: 1965  Age: 59 y.o.  Sex: female      Referring Physician: Dr. Adry Park    Consult Date: 2/22/2025     Subjective:     Chief Complaint: RUQ pain    History of Present Illness: Miguel A Zambrano is a 59 y.o. female with hx of cirrhosis, COPD, DM, HTN, SARA, obesity, CHF, PE, discitis/osteomyelitis, chronic back pain, chronic narcotic use, and PTSD who was admitted with worsening RUQ pain and sepsis. She previously was worked up for cholecystectomy at [facility] for chronic cholecystitis. Prior admission in January 2025 at [facility] for pancreatitis and choledocholithiasis. She had imaging while admitted showing acute cholecystitis with GB wall rupture and abscess formation. ERCP with stent exchange on 2/21/25. Currently reports some improvement but with persistent RUQ pain and nausea.     Past Medical History:   Diagnosis Date    COPD (chronic obstructive pulmonary disease) (HCC)     Diabetes mellitus (HCC)     Hypertension      Past Surgical History:   Procedure Laterality Date    ERCP N/A 12/31/2024    ENDOSCOPIC RETROGRADE CHOLANGIOPANCREATOGRAPHY/EXALT/SPHINCTEROTOMY/BALLOON SWEEP/SPYGLASS/BILIARY STENT PLACEMENT performed by Kenneth Guzman MD at [facility] ENDOSCOPY    ERCP N/A 2/21/2025    ENDOSCOPIC RETROGRADE CHOLANGIOPANCREATOGRAPHY STENT REMOVAL/ BALLOON SWEEP/ STONE REMOVAL/STENT PLACEMENT performed by Kenneth Guzman MD at [facility] ENDOSCOPY      History reviewed. No pertinent family history.  Social History     Tobacco Use    Smoking status: Never    Smokeless tobacco: Never   Substance Use Topics    Alcohol use: Not Currently      Allergies   Allergen Reactions    Bee Venom Anaphylaxis     Current Facility-Administered Medications   Medication Dose Route Frequency Provider Last Rate Last Admin    pantoprazole (PROTONIX) tablet 40 mg  40 mg Oral QAM AC 
General Surgery   Brief Consult Note      The patient is known to our Service for her recent admission 12/31/24-1/3/25 for acute pancreatitis.       Patient will require Cholecystectomy  [facility] the setting of Cirrhosis, Hepatosplenomegaly with Portal HTN and Splenic varices.     She has been evaluated at [facility] 2/12/25 to discuss cholecystectomy for management of recent cholecystitis and GS pancreatitis. Her records were reviewed with plan to proceed with Robotic cholecystectomy.      General Surgery signing off, remain available to acute needs  No charge for this review/consult.     BHARAT MENA, APRN - CNP      
Infectious Disease Consult      Today's Date: 2/25/2025   Admit Date: 2/19/2025  YOB: 1965    Impression:   Hepatic abscess - secondary to cholecystitis/cholelithiasis with gallbladder rupture. Did have stent placed early in February but worsened despite this. Underwent stent exchange and stone extraction 2/21. Then underwent drain placement x 2 2/23, cx pending but with 2 GNR and a step. Discussed with her that we'll continue zosyn, awaiting lab data to determine ultimate regimen. Will likely need sooner gallbladder removal for source control, would reach out to [place] to coordinate possible transfer for this.    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 HBV core positive in 2021. Contributing to above  DM2, no A1c on file, contributing to above      Anti-infectives:   Zosyn 2/19-present    Subjective:   Date of Consultation:  February 25, 2025  Date of Admission: 2/19/2025   Referring Provider: Adam  Reason for consult: abx recs    Patient is a 59 y.o. female with PMH of DM2, cirrhosis 2/2 HBV/HCV who presented with n/v x 2 days. CT showing acute cholecystitis with cholelithiasis as well as possible GB wall rupture and abscess. Started on zosyn empirically. Underwent ERCP with stone extraction and sweep and biliary stent placed. Taken for abscess drain placement x 2 on 2/23, cx with strep and two GNRs. Further ID pending. Plan is ultimately for [facility] once current infection resolved. Currently afebrile, white count stable. She reports having a large BM overnight and feeling better. No pain with the drains. Reports needing to go to [facility] to have her gallbladder removed. Denies IVDU. Reports being treated for HCV with interferon. No meds for HBV.     Patient Active Problem 
See noted below regarding General Surgery Consult 2/20/25  
chills, diaphoresis, fatigue, fever and unexpected weight change.   Gastrointestinal:  Positive for abdominal pain, nausea and vomiting. Negative for anal bleeding, blood in stool, constipation and diarrhea.   All other systems reviewed and are negative.       Physical Exam   /68   Pulse 93   Temp 97.5 °F (36.4 °C) (Axillary)   Resp 18   Ht 1.753 m (5' 9\")   Wt 100.2 kg (221 lb)   SpO2 98%   BMI 32.64 kg/m²      Physical Exam  Vitals and nursing note reviewed.   Constitutional:       Appearance: Normal appearance.   HENT:      Head: Normocephalic and atraumatic.   Eyes:      Conjunctiva/sclera: Conjunctivae normal.   Abdominal:      General: There is distension (Mild).      Tenderness: There is abdominal tenderness (Mild substernal to RUQ TTP). There is no guarding.   Neurological:      General: No focal deficit present.      Mental Status: She is alert.   Psychiatric:         Mood and Affect: Mood normal.         Labs    CBC:  Recent Labs     02/19/25 1245 02/20/25 0447   WBC 12.0* 6.8   RBC 4.40 3.57*   HGB 13.7 10.9*   HCT 39.7 32.3*   MCV 90.2 90.5   RDW 13.3 13.8    161     CHEMISTRIES:  Recent Labs     02/19/25 1245 02/20/25 0447    135*   K 3.7 3.2*   CL 99 102   CO2 29 28   BUN 13 12   CREATININE 0.62* 0.74   GLUCOSE 138* 128*   MG  --  1.7*     PT/INR:No results for input(s): \"PROTIME\", \"INR\" in the last 72 hours.  APTT:No results for input(s): \"APTT\" in the last 72 hours.  LIVER PROFILE:  Recent Labs     02/19/25 1245 02/20/25 0447   AST 14* 10*   ALT <5* <5*   BILITOT 2.2* 1.1   ALKPHOS 170* 103       Imaging/Diagnostics   CT ABDOMEN PELVIS W IV CONTRAST Additional Contrast? None    Result Date: 2/19/2025  Findings of cholelithiasis and acute cholecystitis. Interval stent placement with a singular focus of air within the gallbladder as well as mild central pneumobilia which may be iatrogenic although superimposed infectious process not excluded. General surgical

## 2025-02-25 NOTE — PLAN OF CARE
Problem: Chronic Conditions and Co-morbidities  Goal: Patient's chronic conditions and co-morbidity symptoms are monitored and maintained or improved  Outcome: Progressing     Problem: Discharge Planning  Goal: Discharge to home or other facility with appropriate resources  Outcome: Progressing     Problem: Safety - Adult  Goal: Free from fall injury  Outcome: Progressing     Problem: Pain  Goal: Verbalizes/displays adequate comfort level or baseline comfort level  Outcome: Progressing     Problem: Skin/Tissue Integrity  Goal: Skin integrity remains intact  Description: 1.  Monitor for areas of redness and/or skin breakdown  2.  Assess vascular access sites hourly  3.  Every 4-6 hours minimum:  Change oxygen saturation probe site  4.  Every 4-6 hours:  If on nasal continuous positive airway pressure, respiratory therapy assess nares and determine need for appliance change or resting period  Outcome: Progressing     Problem: Gastrointestinal - Adult  Goal: Minimal or absence of nausea and vomiting  Outcome: Progressing  Goal: Maintains or returns to baseline bowel function  Outcome: Progressing  Goal: Maintains adequate nutritional intake  Outcome: Progressing     Problem: Genitourinary - Adult  Goal: Absence of urinary retention  Outcome: Progressing     Problem: Infection - Adult  Goal: Absence of infection at discharge  Outcome: Progressing  Goal: Absence of infection during hospitalization  Outcome: Progressing     Problem: Metabolic/Fluid and Electrolytes - Adult  Goal: Electrolytes maintained within normal limits  Outcome: Progressing  Goal: Hemodynamic stability and optimal renal function maintained  Outcome: Progressing  Goal: Glucose maintained within prescribed range  Outcome: Progressing     Problem: ABCDS Injury Assessment  Goal: Absence of physical injury  Outcome: Progressing     Problem: Respiratory - Adult  Goal: Achieves optimal ventilation and oxygenation  Outcome: Progressing

## 2025-02-26 VITALS
RESPIRATION RATE: 20 BRPM | HEART RATE: 86 BPM | OXYGEN SATURATION: 92 % | HEIGHT: 68 IN | WEIGHT: 232 LBS | DIASTOLIC BLOOD PRESSURE: 60 MMHG | SYSTOLIC BLOOD PRESSURE: 100 MMHG | BODY MASS INDEX: 35.16 KG/M2 | TEMPERATURE: 98.4 F

## 2025-02-26 LAB
BACTERIA SPEC CULT: ABNORMAL
GLUCOSE BLD STRIP.AUTO-MCNC: 124 MG/DL (ref 65–100)
GLUCOSE BLD STRIP.AUTO-MCNC: 125 MG/DL (ref 65–100)
GRAM STN SPEC: ABNORMAL
GRAM STN SPEC: ABNORMAL
SERVICE CMNT-IMP: ABNORMAL

## 2025-02-26 PROCEDURE — 6370000000 HC RX 637 (ALT 250 FOR IP): Performed by: HOSPITALIST

## 2025-02-26 PROCEDURE — 6360000002 HC RX W HCPCS: Performed by: STUDENT IN AN ORGANIZED HEALTH CARE EDUCATION/TRAINING PROGRAM

## 2025-02-26 PROCEDURE — 6370000000 HC RX 637 (ALT 250 FOR IP): Performed by: INTERNAL MEDICINE

## 2025-02-26 PROCEDURE — 82962 GLUCOSE BLOOD TEST: CPT

## 2025-02-26 PROCEDURE — 2580000003 HC RX 258: Performed by: HOSPITALIST

## 2025-02-26 PROCEDURE — 94640 AIRWAY INHALATION TREATMENT: CPT

## 2025-02-26 PROCEDURE — 99231 SBSQ HOSP IP/OBS SF/LOW 25: CPT | Performed by: PHYSICIAN ASSISTANT

## 2025-02-26 PROCEDURE — 2500000003 HC RX 250 WO HCPCS: Performed by: STUDENT IN AN ORGANIZED HEALTH CARE EDUCATION/TRAINING PROGRAM

## 2025-02-26 PROCEDURE — 94760 N-INVAS EAR/PLS OXIMETRY 1: CPT

## 2025-02-26 PROCEDURE — 6370000000 HC RX 637 (ALT 250 FOR IP): Performed by: STUDENT IN AN ORGANIZED HEALTH CARE EDUCATION/TRAINING PROGRAM

## 2025-02-26 PROCEDURE — 6360000002 HC RX W HCPCS: Performed by: HOSPITALIST

## 2025-02-26 RX ORDER — LEVOFLOXACIN 250 MG/1
750 TABLET, FILM COATED ORAL DAILY
Status: DISCONTINUED | OUTPATIENT
Start: 2025-02-26 | End: 2025-02-26 | Stop reason: HOSPADM

## 2025-02-26 RX ORDER — LEVOFLOXACIN 750 MG/1
750 TABLET, FILM COATED ORAL DAILY
Qty: 21 TABLET | Refills: 0 | Status: SHIPPED | OUTPATIENT
Start: 2025-02-27 | End: 2025-03-20

## 2025-02-26 RX ORDER — METRONIDAZOLE 500 MG/1
500 TABLET ORAL EVERY 12 HOURS SCHEDULED
Qty: 42 TABLET | Refills: 0 | Status: SHIPPED | OUTPATIENT
Start: 2025-02-26 | End: 2025-03-19

## 2025-02-26 RX ORDER — OXYCODONE AND ACETAMINOPHEN 5; 325 MG/1; MG/1
1 TABLET ORAL EVERY 6 HOURS PRN
Qty: 20 TABLET | Refills: 0 | Status: SHIPPED | OUTPATIENT
Start: 2025-02-26 | End: 2025-03-03

## 2025-02-26 RX ORDER — CARBOXYMETHYLCELLULOSE SODIUM 10 MG/ML
1 GEL OPHTHALMIC 3 TIMES DAILY
Status: DISCONTINUED | OUTPATIENT
Start: 2025-02-26 | End: 2025-02-26 | Stop reason: HOSPADM

## 2025-02-26 RX ORDER — METRONIDAZOLE 500 MG/1
500 TABLET ORAL EVERY 12 HOURS SCHEDULED
Status: DISCONTINUED | OUTPATIENT
Start: 2025-02-26 | End: 2025-02-26 | Stop reason: HOSPADM

## 2025-02-26 RX ADMIN — HYDROMORPHONE HYDROCHLORIDE 0.5 MG: 1 INJECTION, SOLUTION INTRAMUSCULAR; INTRAVENOUS; SUBCUTANEOUS at 06:30

## 2025-02-26 RX ADMIN — DULOXETINE HYDROCHLORIDE 60 MG: 60 CAPSULE, DELAYED RELEASE ORAL at 08:40

## 2025-02-26 RX ADMIN — TAMSULOSIN HYDROCHLORIDE 0.4 MG: 0.4 CAPSULE ORAL at 08:40

## 2025-02-26 RX ADMIN — OXYCODONE HYDROCHLORIDE AND ACETAMINOPHEN 1 TABLET: 5; 325 TABLET ORAL at 08:40

## 2025-02-26 RX ADMIN — INSULIN GLARGINE 35 UNITS: 100 INJECTION, SOLUTION SUBCUTANEOUS at 08:40

## 2025-02-26 RX ADMIN — GABAPENTIN 800 MG: 400 CAPSULE ORAL at 08:40

## 2025-02-26 RX ADMIN — METRONIDAZOLE 500 MG: 500 TABLET ORAL at 10:22

## 2025-02-26 RX ADMIN — PIPERACILLIN AND TAZOBACTAM 3375 MG: 3; .375 INJECTION, POWDER, LYOPHILIZED, FOR SOLUTION INTRAVENOUS at 02:14

## 2025-02-26 RX ADMIN — SODIUM CHLORIDE, PRESERVATIVE FREE 10 ML: 5 INJECTION INTRAVENOUS at 08:40

## 2025-02-26 RX ADMIN — LEVOFLOXACIN 750 MG: 250 TABLET, FILM COATED ORAL at 10:22

## 2025-02-26 RX ADMIN — FUROSEMIDE 20 MG: 20 TABLET ORAL at 08:40

## 2025-02-26 RX ADMIN — ARFORMOTEROL TARTRATE: 15 SOLUTION RESPIRATORY (INHALATION) at 08:03

## 2025-02-26 RX ADMIN — CARBOXYMETHYLCELLULOSE SODIUM 1 DROP: 10 GEL OPHTHALMIC at 10:22

## 2025-02-26 RX ADMIN — PANTOPRAZOLE SODIUM 40 MG: 40 TABLET, DELAYED RELEASE ORAL at 05:43

## 2025-02-26 ASSESSMENT — PAIN DESCRIPTION - ORIENTATION
ORIENTATION: RIGHT
ORIENTATION: RIGHT;LEFT;MID

## 2025-02-26 ASSESSMENT — PAIN SCALES - GENERAL
PAINLEVEL_OUTOF10: 8
PAINLEVEL_OUTOF10: 10

## 2025-02-26 ASSESSMENT — PAIN DESCRIPTION - LOCATION
LOCATION: RIB CAGE
LOCATION: ABDOMEN

## 2025-02-26 ASSESSMENT — PAIN DESCRIPTION - DESCRIPTORS
DESCRIPTORS: SHARP
DESCRIPTORS: ACHING

## 2025-02-26 NOTE — DISCHARGE SUMMARY
Hospitalist Discharge Summary   Admit Date:  2025  6:35 PM   DC Note date: 2025  Name:  Miguel A Zambrano   Age:  59 y.o.  Sex:  female  :  1965   MRN:  [id number]   Room:  [facility] [place]  PCP:  Unknown, Provider, ANP    Presenting Complaint: No chief complaint on file.     Initial Admission Diagnosis: Acute cholecystitis [K81.0]     Problem List for this Hospitalization (present on admission):    Principal Problem:    Acute cholecystitis  Active Problems:    Cholecystitis    Type 2 diabetes mellitus (HCC)    Chronic obstructive pulmonary disease (HCC)    Sepsis (HCC)    Cholelithiasis    Class 1 obesity    History of pulmonary embolus (PE)    Diabetic polyneuropathy (HCC)    Urinary retention    Asthma    GERD (gastroesophageal reflux disease)    Liver abscess  Resolved Problems:    * No resolved hospital problems. *      Hospital Course:  Miguel A Zambrano is a 59 y.o. 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  Also had sharp right upper quadrant pain, not related to food.  No fever, chills, chest pain, or diarrhea.  Initially presented to [place] [facility].     In [place] [facility], vitals were significant with HR up to 96 and RR up to 21.  WBC 12.  CMP relatively unremarkable except .  CT abdomen showed an evidence of acute cholecystitis with cholelithiasis.  GI and general surgery were consulted, and a transfer to [facility] was recommended.  She received Zosyn.    Sepsis  -Met SIRS criteria with tachycardia, tachypnea, and leukocytosis  -In the setting of acute cholecystitis and liver abscess  -No evidence of a lactic acidosis or hypotension  -Treating underlying cause        Acute cholecystitis with cholelithiasis  Liver abscess  -Noted in CT abdomen  -MRCP completed and ERCP completed  -GI and general surgery were consulted.  Appreciate recommendations  -Of note,

## 2025-02-26 NOTE — PROGRESS NOTES
Hospitalist Progress Note   Admit Date:  2025  6:35 PM   Name:  Miguel A Zambrano   Age:  59 y.o.  Sex:  female  :  1965   MRN:  [id number]   Room:      Presenting/Chief Complaint: No chief complaint on file.     Reason(s) for Admission: Acute cholecystitis [K81.0]     Hospital Course:   Miguel A Zambrano is a 59 y.o. 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  Also had sharp right upper quadrant pain, not related to food.  No fever, chills, chest pain, or diarrhea.  Initially presented to [place] [facility].     In [place] [facility], vitals were significant with HR up to 96 and RR up to 21.  WBC 12.  CMP relatively unremarkable except .  CT abdomen showed an evidence of acute cholecystitis with cholelithiasis.  GI and general surgery were consulted, and a transfer to [facility] was recommended.  She received Zosyn.      Subjective & 24hr Events:   Mrs. Zambrano continues to be monitoring and treated during her stay. She had her ERCP today with Dr. Guzman, please see full op note. GI has consulted IF for possible drainage of liver/pericholecystic abscess. Liquid diet has been ordered.      Assessment & Plan:   Sepsis  -Met SIRS criteria with tachycardia, tachypnea, and leukocytosis  -In the setting of acute cholecystitis  -No evidence of a lactic acidosis or hypotension  -Treating underlying cause        Acute cholecystitis with cholelithiasis  -Noted in CT abdomen  -MRCP completed and ERCP today  -Will continue Zosyn  -GI and general surgery were consulted.  Appreciate recommendations  -Of note, the patient previously followed GI at [facility].  Apparently, they were planning on cholecystectomy in the near future.    --GI and general surgery saw and following with their recommendations--will have ERCP today with Dr. Guzman        Cirrhosis, induced by prior 
       Hospitalist Progress Note   Admit Date:  2025  6:35 PM   Name:  Miguel A Zambrano   Age:  59 y.o.  Sex:  female  :  1965   MRN:  [id number]   Room:      Presenting/Chief Complaint: No chief complaint on file.     Reason(s) for Admission: Acute cholecystitis [K81.0]     Hospital Course:   Miguel A Zambrano is a 59 y.o. 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  Also had sharp right upper quadrant pain, not related to food.  No fever, chills, chest pain, or diarrhea.  Initially presented to [place] [facility].     In [place] [facility], vitals were significant with HR up to 96 and RR up to 21.  WBC 12.  CMP relatively unremarkable except .  CT abdomen showed an evidence of acute cholecystitis with cholelithiasis.  GI and general surgery were consulted, and a transfer to [facility] was recommended.  She received Zosyn.         Subjective & 24hr Events:   Mrs. Zambrano did have her drain placed today.  She is back and seems to have no issues.  Diet will be restarted.  We will await interventional radiology recommendations for discharge      Assessment & Plan:   Sepsis  -Met SIRS criteria with tachycardia, tachypnea, and leukocytosis  -In the setting of acute cholecystitis and liver abscess  -No evidence of a lactic acidosis or hypotension  -Treating underlying cause        Acute cholecystitis with cholelithiasis  Liver abscess  -Noted in CT abdomen  -MRCP completed and ERCP completed  -Will continue Zosyn  -GI and general surgery were consulted.  Appreciate recommendations  -Of note, the patient previously followed GI at [facility].  Apparently, they were planning on cholecystectomy in the near future.    --GI and general surgery saw and following with their recommendations--ERCP with Dr. Guzman completed  --Consulted placed for IR for drain placement for liver 
       Hospitalist Progress Note   Admit Date:  2025  6:35 PM   Name:  Miguel A Zambrano   Age:  59 y.o.  Sex:  female  :  1965   MRN:  [id number]   Room:      Presenting/Chief Complaint: No chief complaint on file.     Reason(s) for Admission: Acute cholecystitis [K81.0]     Hospital Course:   Miguel A Zambrano is a 59 y.o. 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  Also had sharp right upper quadrant pain, not related to food.  No fever, chills, chest pain, or diarrhea.  Initially presented to [place] [facility].     In [place] [facility], vitals were significant with HR up to 96 and RR up to 21.  WBC 12.  CMP relatively unremarkable except .  CT abdomen showed an evidence of acute cholecystitis with cholelithiasis.  GI and general surgery were consulted, and a transfer to [facility] was recommended.  She received Zosyn.      Subjective & 24hr Events:   Patient seen and examined at bedside.  She is alert and awake, AOx3, on room air.  Patient reports feeling okay, denies any acute chest pain, shortness of breath, nausea vomiting or diarrhea.  She reports that abdominal pain is much better and well-controlled.    Plan to consult ID today to discuss antibiotic treatment regimen.    ROS:  10 point review of system is negative except for what mentioned above.      Assessment & Plan:   Sepsis  -Met SIRS criteria with tachycardia, tachypnea, and leukocytosis  -In the setting of acute cholecystitis and liver abscess  -No evidence of a lactic acidosis or hypotension  -Treating underlying cause        Acute cholecystitis with cholelithiasis  Liver abscess  -Noted in CT abdomen  -MRCP completed and ERCP completed  -Will continue Zosyn  -GI and general surgery were consulted.  Appreciate recommendations  -Of note, the patient previously followed GI at [facility].  
       Hospitalist Progress Note   Admit Date:  2025  6:35 PM   Name:  Miguel A Zambrano   Age:  59 y.o.  Sex:  female  :  1965   MRN:  [id number]   Room:      Presenting/Chief Complaint: No chief complaint on file.     Reason(s) for Admission: Acute cholecystitis [K81.0]     Hospital Course:   Miguel A Zambrano is a 59 y.o. 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  Also had sharp right upper quadrant pain, not related to food.  No fever, chills, chest pain, or diarrhea.  Initially presented to [place] [facility].     In [place] [facility], vitals were significant with HR up to 96 and RR up to 21.  WBC 12.  CMP relatively unremarkable except .  CT abdomen showed an evidence of acute cholecystitis with cholelithiasis.  GI and general surgery were consulted, and a transfer to [facility] was recommended.  She received Zosyn.      Subjective & 24hr Events:   Mrs. Mejia continues to be monitored and treated. I went by her room twice and was unable to see her, but will see her tomorrow. She has seen GI, Gen. Surg., Anesthesiology, and had an MRCP today. Plan is for Dr. Guzman to take for ERCP tomorrow.     Gen. Surgery did sign off and recommends that she follow-up at [facility].      Assessment & Plan:   Sepsis  -Met SIRS criteria with tachycardia, tachypnea, and leukocytosis  -In the setting of acute cholecystitis  -No evidence of a lactic acidosis or hypotension  -Treating underlying cause        Acute cholecystitis with cholelithiasis  -Noted in CT abdomen  -MRCP completed today  -Will continue Zosyn  -GI and general surgery were consulted.  Appreciate recommendations  -Of note, the patient previously followed GI at [facility].  Apparently, they were planning on cholecystectomy in the near future.    --GI and general surgery saw and following with their 
    [facility]  Department of Interventional Radiology  [phone]                                 Progress Note  Patient: Miguel A Zambrano MRN: [id number]  SSN: [id number]    YOB: 1965  Age: 59 y.o.  Sex: female      Subjective:   Feels well this morning, states this is the first time in a year that she has had an appetite.  Concerns that the flush from 1 drain exits the other drain.    Review of Systems:    Pertinent items are noted in HPI.    Objective:     Vitals:    02/26/25 0630 02/26/25 0807 02/26/25 0813 02/26/25 0814   BP: (!) 100/56  100/60 100/60   Pulse:   86 86   Resp: 18  20 20   Temp:   98.4 °F (36.9 °C) 98.4 °F (36.9 °C)   TempSrc:    Axillary   SpO2:  95% 92% 92%   Weight:       Height:            Pain Assessment                      Pain Level: 8   Patient's Stated Pain Goal: 0 - No pain   Pain Location: Abdomen   Pain Orientation: Right, Left, Mid   Pain Descriptors: Aching   Functional Pain Assessment: Activities are not prevented   Pain Type: Acute pain   Pain Frequency: Continuous   Non-Pharmaceutical Pain Intervention(s): Distraction, Environmental changes, Emotional support, Repositioned, Therapeutic presence     Intake and Output:  Closed/Suction Drain Lateral RUQ Bulb (Active)   Site Description Clean, dry & intact 02/26/25 0805   Dressing Status New dressing applied 02/26/25 0805   Drainage Appearance Serosanguinous 02/26/25 0805   Drain Status Compressed;To bulb suction 02/26/25 0805   Output (ml) 20 ml 02/26/25 0547       Closed/Suction Drain Medial RUQ Bulb (Active)   Site Description Clean, dry & intact 02/26/25 0805   Dressing Status Clean, dry & intact 02/26/25 0805   Drainage Appearance Clear 02/26/25 0805   Drain Status Compressed;To bulb suction 02/26/25 0805   Output (ml) 10 ml 02/26/25 0547             Physical Exam:   HEART: regular rate and rhythm  LUNG: clear to auscultation bilaterally  ABDOMEN: Soft, obese.  Blood-tinged fluid 
4 Eyes Skin Assessment     NAME:  Miguel A Zambrano  YOB: 1965  MEDICAL RECORD NUMBER:  [id number]    The patient is being assessed for  Admission    I agree that at least one RN has performed a thorough Head to Toe Skin Assessment on the patient. ALL assessment sites listed below have been assessed.      Areas assessed by both nurses:    Head, Face, Ears, Shoulders, Back, Chest, Arms, Elbows, Hands, Sacrum. Buttock, Coccyx, Ischium, Legs. Feet and Heels, and Under Medical Devices         Does the Patient have a Wound? No noted wound(s)       Rodolfo Prevention initiated by RN: No  Wound Care Orders initiated by RN: No    Pressure Injury (Stage 3,4, Unstageable, DTI, NWPT, and Complex wounds) if present, place Wound referral order by RN under : No    New Ostomies, if present place, Ostomy referral order under : No     Nurse 1 eSignature: Electronically signed by Starr Mitchell RN on 2/19/25 at 7:00 PM EST    **SHARE this note so that the co-signing nurse can place an eSignature**    Nurse 2 eSignature: Electronically signed by Alicia Castano RN on 2/19/25 at 7:01 PM EST   
ACUTE PHYSICAL THERAPY GOALS:   (Developed with and agreed upon by patient and/or caregiver.)    LTG:  (1.)Ms. Zambrano will move from supine to sit and sit to supine , scoot up and down, and roll side to side in bed with INDEPENDENT within 7 treatment day(s).    (2.)Ms. Zambrano will transfer from bed to chair and chair to bed with MODIFIED INDEPENDENCE using the least restrictive device within 7 treatment day(s).    (3.)Ms. Zambrano will ambulate with MODIFIED INDEPENDENCE for 300+ feet with the least restrictive device within 7 treatment day(s).  (4.)Ms. Zambrano will tolerate at least 23 min of dynamic standing activity to assist standing ADLs with the least restrictive device within 7 treatment days.       PHYSICAL THERAPY: Daily Note PM   (Link to Caseload Tracking: PT Visit Days : 2  Time In/Out PT Charge Capture  Rehab Caseload Tracker  Orders    Miguel A Zambrano is a 59 y.o. female   PRIMARY DIAGNOSIS: Acute cholecystitis  Acute cholecystitis [K81.0]  Procedure(s) (LRB):  ENDOSCOPIC RETROGRADE CHOLANGIOPANCREATOGRAPHY STENT REMOVAL/ BALLOON SWEEP/ STONE REMOVAL/STENT PLACEMENT (N/A)  1 Day Post-Op  Inpatient: Payor: BCBS / Plan: BCBS OUT OF STATE / Product Type: *No Product type* /     ASSESSMENT:     REHAB RECOMMENDATIONS:   Recommendation to date pending progress:  Setting:  Home Health Therapy    Equipment:    To Be Determined     ASSESSMENT:  Ms. Zambrano is up in her room and agreeable to working with therapy. She reports feeling much better after her procedure and being able to eat. She was able to ambulate 150ft with RW demonstrating a good chioma, but minimal foot clearance and some path deviations due to getting distracted with conversation. Patient then took a prolonged standing rest break before ambulating another 100ft with RW back to her room. Overall she only required supervision this session and demonstrated improved balance and activity tolerance and gait. Patient left supine in bed with all 
ACUTE PHYSICAL THERAPY GOALS:   (Developed with and agreed upon by patient and/or caregiver.)    LTG:  (1.)Ms. Zambrano will move from supine to sit and sit to supine , scoot up and down, and roll side to side in bed with INDEPENDENT within 7 treatment day(s).    (2.)Ms. Zambrano will transfer from bed to chair and chair to bed with MODIFIED INDEPENDENCE using the least restrictive device within 7 treatment day(s).    (3.)Ms. Zambrano will ambulate with MODIFIED INDEPENDENCE for 300+ feet with the least restrictive device within 7 treatment day(s).  (4.)Ms. Zambrano will tolerate at least 23 min of dynamic standing activity to assist standing ADLs with the least restrictive device within 7 treatment days.       PHYSICAL THERAPY: Daily Note PM   (Link to Caseload Tracking: PT Visit Days : 3  Time In/Out PT Charge Capture  Rehab Caseload Tracker  Orders    Miguel A Zambrano is a 59 y.o. female   PRIMARY DIAGNOSIS: Acute cholecystitis  Acute cholecystitis [K81.0]  Procedure(s) (LRB):  ENDOSCOPIC RETROGRADE CHOLANGIOPANCREATOGRAPHY STENT REMOVAL/ BALLOON SWEEP/ STONE REMOVAL/STENT PLACEMENT (N/A)  4 Days Post-Op  Inpatient: Payor: BCBS / Plan: BCBS OUT OF STATE / Product Type: *No Product type* /     ASSESSMENT:     REHAB RECOMMENDATIONS:   Recommendation to date pending progress:  Setting:  Home Health Therapy    Equipment:    To Be Determined     ASSESSMENT:  Ms. Zambrano is up in her room and agreeable to working with therapy with encouragement. She is more fatigued today, requiring seated rest breaks, as well increased in impulsivity (pt picking up and at attempting to swing walker in wright).       Upon entering, Pnt is agreeable to PT treatment.  she reports no pain  at rest. Pnt performed supine > sit with supervision, sitting EOB with fair+ sitting balance control. Sit > stand with supervision using RW. Gait 2 x10, 2 x 125 ft with SBA and seated rest break, cues for step length.  Gait is noted to be slowed, yet 
ACUTE PHYSICAL THERAPY GOALS:   (Developed with and agreed upon by patient and/or caregiver.)    LTG:  (1.)Ms. Zambrano will move from supine to sit and sit to supine , scoot up and down, and roll side to side in bed with INDEPENDENT within 7 treatment day(s).    (2.)Ms. Zambrano will transfer from bed to chair and chair to bed with MODIFIED INDEPENDENCE using the least restrictive device within 7 treatment day(s).    (3.)Ms. Zambrano will ambulate with MODIFIED INDEPENDENCE for 300+ feet with the least restrictive device within 7 treatment day(s).  (4.)Ms. Zambrano will tolerate at least 23 min of dynamic standing activity to assist standing ADLs with the least restrictive device within 7 treatment days.      ________________________________________________________________________________________________      PHYSICAL THERAPY Initial Assessment, Daily Note, and PM  (Link to Caseload Tracking: PT Visit Days : 1  Acknowledge Orders  Time In/Out  PT Charge Capture  Rehab Caseload Tracker    Miguel A Zambrano is a 59 y.o. female   PRIMARY DIAGNOSIS: Acute cholecystitis  Acute cholecystitis [K81.0]  Procedure(s) (LRB):  ENDOSCOPIC RETROGRADE CHOLANGIOPANCREATOGRAPHY (N/A)     Reason for Referral: Generalized Muscle Weakness (M62.81)  Other lack of cordination (R27.8)  Difficulty in walking, Not elsewhere classified (R26.2)  Other abnormalities of gait and mobility (R26.89)  Inpatient: Payor: BCBS / Plan: BCBS OUT OF STATE / Product Type: *No Product type* /     ASSESSMENT:     REHAB RECOMMENDATIONS:   Recommendation to date pending progress:  Setting:  Home Health Therapy    Equipment:    To Be Determined     ASSESSMENT:  Ms. Zambrano presents in supine, agreeable to session. Pt seen w/ OT due to decreased activity tolerance, and frequent seated rest breaks. OT departs room following hallway ambulation.       Upon entering, Pnt is agreeable to PT treatment.  she reports 10/10 pain in her belly at rest. RN administers pain 
Called IR r/t SHABBIR drains/stop cock, spoke with Hoda, she will call back.    1619-IR staff to room, added stop cocks. Thank you to IRNaila  
Database and home med list has been completed. Hospitalist notified for review.       
Discharge instructions were reviewed with the patient.  Opportunity for questions given.  Patient verbalized understanding with discharge and follow up instructions, as well as signs and symptoms to report to MD or to return to ER for.  PIV was removed, Rx's were provided.  Pt will D/c to home.   
END OF SHIFT NOTE:    INTAKE/OUTPUT  02/22 0701 - 02/23 0700  In: 730.1 [P.O.:720]  Out: 300 [Urine:300]  Voiding: yes  Catheter: no  Drain:              Flatus: Patient does have flatus present.    Stool:  0 occurrences.    Characteristics:       Emesis: 0 occurrences.    Characteristics:        VITAL SIGNS  Patient Vitals for the past 12 hrs:   Temp Pulse Resp BP SpO2   02/23/25 0325 97.4 °F (36.3 °C) 73 16 97/63 93 %   02/22/25 2250 98.2 °F (36.8 °C) 92 20 (!) 98/56 95 %   02/22/25 1934 -- 81 16 -- 99 %   02/22/25 1921 98.7 °F (37.1 °C) 92 16 119/75 92 %       Pain Assessment                Ambulating  Not oob this shift. Very worried about her \"bad gallbladder\" and upcoming procedures.  Emotional support given.     Shift report given to oncoming nurse at the bedside.    Ирина Clark RN     
END OF SHIFT NOTE:    INTAKE/OUTPUT  02/23 0701 - 02/24 0700  In: -   Out: 930 [Urine:900; Drains:30]  Voiding: yes  Catheter: no  Drain:   Closed/Suction Drain Lateral RUQ Bulb (Active)   Site Description Clean, dry & intact 02/23/25 1708   Dressing Status Clean, dry & intact 02/23/25 1708   Drainage Appearance Bloody 02/23/25 1708   Drain Status Compressed;To bulb suction 02/23/25 1708   Output (ml) 10 ml 02/23/25 1708       Closed/Suction Drain Medial RUQ Bulb (Active)   Site Description Clean, dry & intact 02/23/25 1708   Dressing Status Clean, dry & intact 02/23/25 1708   Drainage Appearance Bloody 02/23/25 1708   Drain Status Compressed;To bulb suction 02/23/25 1708   Output (ml) 20 ml 02/23/25 1708               Flatus: Patient does have flatus present.    Stool:  0 occurrences.    Characteristics:       Emesis:  occurrences.    Characteristics:        VITAL SIGNS  Patient Vitals for the past 12 hrs:   Temp Pulse Resp BP SpO2   02/24/25 0345 98.4 °F (36.9 °C) 100 20 126/76 --   02/23/25 2300 98.2 °F (36.8 °C) 89 18 113/62 96 %   02/23/25 2256 -- -- 20 -- --   02/23/25 2045 -- 96 18 -- 95 %   02/23/25 1945 98.2 °F (36.8 °C) 96 -- 106/69 94 %       Pain Assessment                Ambulating  Yes. Jps are draining cloudy maroon colored thick drainage.     Shift report given to oncoming nurse at the bedside.    Ирина Clark, RN     
END OF SHIFT NOTE:    INTAKE/OUTPUT  No intake/output data recorded.  Voiding: Yes  Catheter: No  Drain:              Flatus: Patient does have flatus present.    Stool:  0 occurrences.    Characteristics:                Emesis: 0  occurrences.    Characteristics:        VITAL SIGNS  Patient Vitals for the past 12 hrs:   Temp Pulse Resp BP SpO2   02/20/25 0250 99.1 °F (37.3 °C) 93 15 101/64 92 %   02/19/25 2300 99.7 °F (37.6 °C) (!) 105 16 108/62 93 %   02/19/25 1935 99.7 °F (37.6 °C) 95 19 124/63 97 %   02/19/25 1837 99 °F (37.2 °C) 96 21 121/69 93 %       Pain Assessment  Pain Level: 10 (02/20/25 0523)  Pain Location: Abdomen  Patient's Stated Pain Goal: 0 - No pain    Ambulating  Yes    Shift report given to oncoming nurse at the bedside.    Pamella Maxwell RN     
Infectious Disease Consult      Today's Date: 2/26/2025   Admit Date: 2/19/2025  YOB: 1965    Impression:   Hepatic abscess - secondary to cholecystitis/cholelithiasis with gallbladder rupture. Did have stent placed early in February but worsened despite this. Underwent stent exchange and stone extraction 2/21. Then underwent drain placement x 2 2/23, cx with GCS and cronobacter sakazakii that's zosyn intermediate but FQN, ctx, bactrim and gina S. Will change to levaquin and flagyl today. Discussed med side effects. She needs source control so I have reached out to primary about direct transfer to [facility] for cholecystectomy. If this is not possible would prefer she stay on abx until procedure which hopefully can be done in the next few weeks.     Transition to levaquin 750mgPO q24h + flagyl 500mg PO q12h  Duration at least 3 weeks unless she is able to go for source control sooner  Recommend reaching out to [facility] about direct transfer  Cirrhosis 2/2 Listed HBV/HCV - reports being treated with interferon for HCV, HBSag and core negative on testing in 2024; HCV ab positive but VL negative in 2021. HBV core positive in 2021. Contributing to above  DM2, no A1c on file, contributing to above      Anti-infectives:   Zosyn 2/19-present    Subjective:   Interval events: feels well, asking to go home today. Questions about flushes and her drain    Patient is a 59 y.o. female with PMH of DM2, cirrhosis 2/2 HBV/HCV who presented with n/v x 2 days. CT showing acute cholecystitis with cholelithiasis as well as possible GB wall rupture and abscess. Started on zosyn empirically. Underwent ERCP with stone extraction and sweep and biliary stent placed. Taken for abscess drain placement x 2 on 2/23, cx with strep and two GNRs. Further ID pending. Plan is ultimately for jason once current infection resolved. Currently afebrile, white count stable. She reports having a large BM overnight and feeling better. No pain with the 
Jewelry was secured while pt was in MRI, returned at this time. Pt did not want to put jewelry back on, but was placed in her purse.   
Nutrition Assessment  Assessment Type: Initial, Positive nutrition screen  Reason for visit:  Best Practice Alert: Malnutrition Screening Tool   Malnutrition Screening Tool Score: 2    Nutrition Intervention:   Food and/or Nutrient Delivery:   Meals and Snacks:  Diet: Continue current diet and advance as medically appropriate  Medical Food Supplements:   Medical food supplement therapy:  Initiate Ensure High Protein (low calorie high protein) 160 calories, 16 grams protein per 8 ounce serving      Malnutrition Assessment:  [facility] Clinical Malnutrition Criteria  Malnutrition Status: At risk for malnutrition (weight loss, decreased appetite & oral intake)  Nutrition Focused Physical Exam: Unremarkable     Nutrition Assessment:  Food/Nutrition Related History:   Patient states at baseline she has a great appetite. However, she reports a decline in appetite over the last few months due to nausea related to her gallbladder issues. Endorses eating 50% less than usual during this time. Reports she had started eating 4 small meals/day. She was mainly eating yogurt, pudding, jello, soup, mashed potatoes, and mac & cheese. Does c/o nausea w/ vomiting prior to admit. Also c/o diarrhea on/off.      Do You Have Any Cultural, Mosque, or Ethnic Food Preferences?: No     Weight History:   EMR weight history review from family office visits: 246# 5/13/24, 256# 6/17/24, 234# 11/18/24, 227# 1/24/25. Reports a 10-12 lb weight loss r/t poor appetite/intakes. Per above review, she has lost 29# (11.3%) over the last 9 months which isn't clinically significant, but still notable.     Nutrition Background:       PMH significant for DM 2, COPD, cirrhosis r/t hep B&C, HTN.   Patient presents with sharp RUQ pain & n/v. Is admitted with acute cholecystitis & sepsis. Of note, she was recently admitted from 12/31/24-1/3/25 for acute pancreatitis.   2/19: CT A/P - Findings of cholelithiasis and acute cholecystitis. Interval stent 
Perfect serve to provider; pt request to see provider.     1551-perfect serve to Dr Camacho Murillo IR r/t questions about SHABBIR drain x2 in RUQ.    1557-spoke with IR RN, Dr Murillo is in a case and can after at the conclusion. Dr Park updated via Perfect Serve.  
Physical Therapy Note:    Attempted to see patient this AM for physical therapy evaluation session. Patient off floor. Will follow and re-attempt as schedule permits/patient available. Thank you,    Kyung Moraes, PT     Rehab Caseload Tracker    
RN flushed pts drains per order. RN noticed that the opposite drain was drain the 10 cc flush from the other. IR notified. IR will evaluate in the AM. RN to pass on to oncoming RN.   
Rapid response called by primary nurse Erika due to pt not responding to her, eyes rolled back slightly in head, Stiffening of arms. This lasted a few seconds. Chester Ambrosio/Ryan here along with ICU nurses/Rt. 0615-Eyes open. Sinus arrhythmia on monitor. 0627-Labs/EKG ordered. 0633-Hospitalists will discuss this with am hospitalist. Remains awake/alert. Able to speak . States he does not remember episode.   
SBAR out to Michaela PETERSON in preop.    1551-SBAR in from Jaylen Pandya RN.   
Security in room collecting pts purse that pt wanted in security.    1349-perfect serve to lab r/t lab    1510-perfect serve to provider r/t diet order.     1930-went to ERCP today, returned, iv abx given, prn dilaudid given. Clear liquid diet orders x 24 hours. Sitting up in bed putting jewelry back on. Yellow socks on.  Alert and oriented x4. Denies distress.   
Spiritual Health History and Assessment/Progress Note  [facility]    (P) Spiritual/Emotional Needs,  ,  ,      Name: Miguel A Zambrano MRN: [id number]    Age: 59 y.o.     Sex: female   Language: English   Jew: Synagogue   Acute cholecystitis     Date: 2/24/2025            Total Time Calculated: (P) 25 min              Spiritual Assessment began in [facility] 2 SURGICAL        Referral/Consult From: (P) Rounding   Encounter Overview/Reason: (P) Spiritual/Emotional Needs  Service Provided For: (P) Patient    Yina, Belief, Meaning:   Patient identifies as spiritual  Family/Friends No family/friends present      Importance and Influence:  Patient has no beliefs influential to healthcare decision-making identified during this visit  Family/Friends No family/friends present    Community:  Patient feels well-supported. Support system includes: Spouse/Partner and Parent/s  Family/Friends No family/friends present    Assessment and Plan of Care:     Patient Interventions include: Facilitated expression of thoughts and feelings, Engaged in theological reflection, Affirmed coping skills/support systems, Provided sacramental/Episcopalian ritual, and Facilitated life review and/ or legacy  Family/Friends Interventions include: No family/friends present    Patient Plan of Care: Spiritual Care available upon further referral  Family/Friends Plan of Care: No family/friends present    Electronically signed by MADAY FAIRBANKS on 2/24/2025 at 3:41 PM    
Spoke with IR -- Drain to be placed 2/23 at 0900 -- hold blood thinners and NPO at midnight -- patient will be picked up and in IR at 0900--  
TRANSFER - OUT REPORT:    Verbal report given to Oly RN on Miguel A Zambrano  being transferred [facility] for routine progression of patient care       Report consisted of patient's Situation, Background, Assessment and   Recommendations(SBAR).     Information from the following report(s) Nurse Handoff Report, Surgery Report, Intake/Output, and MAR was reviewed with the receiving nurse.           Lines:   Peripheral IV 02/22/25 Posterior;Right Hand (Active)   Site Assessment Clean, dry & intact 02/22/25 2214   Line Status Capped;Flushed 02/22/25 2214   Line Care Connections checked and tightened 02/22/25 2214   Phlebitis Assessment No symptoms 02/22/25 2214   Infiltration Assessment 0 02/22/25 2214   Alcohol Cap Used Yes 02/22/25 2214   Dressing Status Clean, dry & intact 02/22/25 2214   Dressing Type Transparent 02/22/25 2214        Opportunity for questions and clarification was provided.      Patient transported with:  Tech       
Up ad loyda ambulating in room.  IV antibiotic infused.  Medicated for pain / nausea x 1.  NPO for procedure.  Call light in reach.  
related to limited food acceptance as evidenced by variable po intake    Nutrition Goal(s):   Previous Goal Met: Progressing toward Goal(s)  Active Goal: Meet at least 75% of estimated needs, by next RD assessment  Type of Goal: Continue current goal    Discharge Planning:    Continue current diet    Ann Smith, RD    
of the patient's response to treatment in order to develop a plan of care.     TREATMENT:   Co-Treatment between OT and PT necessary due to patient's decreased overall endurance/tolerance levels, as well as need for high level skilled assistance to complete functional transfers/mobility and functional tasks  Self Care (23 minutes): Patient participated in lower body dressing, grooming, functional mobility, bed mobility, and compensatory technique in unsupported sitting and standing with minimal verbal cueing to increase independence, decrease assistance required, and increase activity tolerance. The patient was educated on role of occupational therapy, compensatory technique during ADL , recommended equipment, and transfer training and safety and patient verbalized understanding.     TREATMENT GRID:  N/A    AFTER TREATMENT PRECAUTIONS: Call light within reach, Chair, Needs within reach, and RN notified    INTERDISCIPLINARY COLLABORATION:  RN/ PCT and PT/ PTA    EDUCATION:  Education Given To: Patient  Education Provided: Role of Therapy;Plan of Care    TOTAL TREATMENT DURATION AND TIME:  Time In: 1330  Time Out: 1408  Minutes: 38    Milka Bradley OT               
AM   Result Value Ref Range    POC Glucose 141 (H) 65 - 100 mg/dL    Performed by: FabiolacobPCT    Culture, Wound (with Gram Stain)    Collection Time: 02/23/25 10:15 AM    Specimen: Abscess    DRAINAGE   Result Value Ref Range    Special Requests NO SPECIAL REQUESTS      Gram Stain MANY WBCS SEEN      Gram Stain NO DEFINITE ORGANISM SEEN      Culture LIGHT Gram negative rods SUBCULTURE IN PROGRESS (A)     POCT Glucose    Collection Time: 02/23/25 11:28 AM   Result Value Ref Range    POC Glucose 120 (H) 65 - 100 mg/dL    Performed by: FabiolacobPCT    POCT Glucose    Collection Time: 02/23/25  4:11 PM   Result Value Ref Range    POC Glucose 86 65 - 100 mg/dL    Performed by: gifteeHardeepcobPCT    POCT Glucose    Collection Time: 02/23/25  7:35 PM   Result Value Ref Range    POC Glucose 223 (H) 65 - 100 mg/dL    Performed by: Nathanael    CBC with Auto Differential    Collection Time: 02/24/25  5:27 AM   Result Value Ref Range    WBC 4.6 4.3 - 11.1 K/uL    RBC 3.99 (L) 4.05 - 5.2 M/uL    Hemoglobin 12.2 11.7 - 15.4 g/dL    Hematocrit 37.8 35.8 - 46.3 %    MCV 94.7 82 - 102 FL    MCH 30.6 26.1 - 32.9 PG    MCHC 32.3 31.4 - 35.0 g/dL    RDW 13.7 11.9 - 14.6 %    Platelets 221 150 - 450 K/uL    MPV 9.3 (L) 9.4 - 12.3 FL    nRBC 0.00 0.0 - 0.2 K/uL    Differential Type AUTOMATED      Neutrophils % 55.3 43.0 - 78.0 %    Lymphocytes % 28.9 13.0 - 44.0 %    Monocytes % 11.6 4.0 - 12.0 %    Eosinophils % 3.3 0.5 - 7.8 %    Basophils % 0.2 0.0 - 2.0 %    Immature Granulocytes % 0.7 0.0 - 5.0 %    Neutrophils Absolute 2.53 1.70 - 8.20 K/UL    Lymphocytes Absolute 1.32 0.50 - 4.60 K/UL    Monocytes Absolute 0.53 0.10 - 1.30 K/UL    Eosinophils Absolute 0.15 0.00 - 0.80 K/UL    Basophils Absolute 0.01 0.00 - 0.20 K/UL    Immature Granulocytes Absolute 0.03 0.0 - 0.5 K/UL   Comprehensive Metabolic Panel w/ Reflex to MG    Collection Time: 02/24/25  5:27 AM   Result Value Ref Range    Sodium 138 136 - 145 mmol/L    
2.3 - 3.5 g/dL    Albumin/Globulin Ratio 0.5 (L) 1.0 - 1.9     POCT Glucose    Collection Time: 02/22/25  7:47 AM   Result Value Ref Range    POC Glucose 132 (H) 65 - 100 mg/dL    Performed by: Tiffanie    POCT Glucose    Collection Time: 02/22/25 11:30 AM   Result Value Ref Range    POC Glucose 174 (H) 65 - 100 mg/dL    Performed by: Ata        No results for input(s): \"COVID19\" in the last 72 hours.    Current Meds:  Current Facility-Administered Medications   Medication Dose Route Frequency    pantoprazole (PROTONIX) tablet 40 mg  40 mg Oral QAM AC    naloxone 0.4 mg in 10 mL sodium chloride syringe   IntraVENous PRN    HYDROmorphone (DILAUDID) injection 0.5 mg  0.5 mg IntraVENous Q5 Min PRN    labetalol (NORMODYNE;TRANDATE) injection 10 mg  10 mg IntraVENous Q15 Min PRN    Or    hydrALAZINE (APRESOLINE) injection 10 mg  10 mg IntraVENous Q15 Min PRN    HYDROmorphone (DILAUDID) injection 0.25 mg  0.25 mg IntraVENous Q5 Min PRN    sodium chloride flush 0.9 % injection 40 mL  40 mL IntraVENous PRN    traZODone (DESYREL) tablet 50 mg  50 mg Oral Nightly    sodium chloride flush 0.9 % injection 5-40 mL  5-40 mL IntraVENous 2 times per day    sodium chloride flush 0.9 % injection 5-40 mL  5-40 mL IntraVENous PRN    0.9 % sodium chloride infusion   IntraVENous PRN    potassium chloride (KLOR-CON M) extended release tablet 40 mEq  40 mEq Oral PRN    Or    potassium bicarb-citric acid (EFFER-K) effervescent tablet 40 mEq  40 mEq Oral PRN    Or    potassium chloride 10 mEq/100 mL IVPB (Peripheral Line)  10 mEq IntraVENous PRN    magnesium sulfate 2000 mg in 50 mL IVPB premix  2,000 mg IntraVENous PRN    polyethylene glycol (GLYCOLAX) packet 17 g  17 g Oral Daily PRN    acetaminophen (TYLENOL) tablet 650 mg  650 mg Oral Q6H PRN    Or    acetaminophen (TYLENOL) suppository 650 mg  650 mg Rectal Q6H PRN    hydrALAZINE (APRESOLINE) injection 10 mg  10 mg IntraVENous Q6H PRN    ondansetron (ZOFRAN) injection 8

## 2025-02-26 NOTE — PLAN OF CARE
Problem: Chronic Conditions and Co-morbidities  Goal: Patient's chronic conditions and co-morbidity symptoms are monitored and maintained or improved  2/26/2025 1126 by Noemi Morocho RN  Outcome: Progressing  Flowsheets (Taken 2/26/2025 0805)  Care Plan - Patient's Chronic Conditions and Co-Morbidity Symptoms are Monitored and Maintained or Improved:   Monitor and assess patient's chronic conditions and comorbid symptoms for stability, deterioration, or improvement   Collaborate with multidisciplinary team to address chronic and comorbid conditions and prevent exacerbation or deterioration   Update acute care plan with appropriate goals if chronic or comorbid symptoms are exacerbated and prevent overall improvement and discharge  2/26/2025 0014 by Stephanie Garcia RN  Outcome: Progressing     Problem: Discharge Planning  Goal: Discharge to home or other facility with appropriate resources  2/26/2025 1126 by Noemi Morocho RN  Outcome: Progressing  Flowsheets (Taken 2/26/2025 0805)  Discharge to home or other facility with appropriate resources:   Identify barriers to discharge with patient and caregiver   Arrange for needed discharge resources and transportation as appropriate   Identify discharge learning needs (meds, wound care, etc)   Refer to discharge planning if patient needs post-hospital services based on physician order or complex needs related to functional status, cognitive ability or social support system  2/26/2025 0014 by Stephanie Garcia, RN  Outcome: Progressing     Problem: Safety - Adult  Goal: Free from fall injury  2/26/2025 1126 by Noemi Morocho, RN  Outcome: Progressing  2/26/2025 0014 by Stephanie Garcia, RN  Outcome: Progressing     Problem: Pain  Goal: Verbalizes/displays adequate comfort level or baseline comfort level  2/26/2025 1126 by Noemi Morocho, RN  Outcome: Progressing  2/26/2025 0014 by Stephanie Garcia, RN  Outcome: Progressing     Problem: Skin/Tissue Integrity  Goal:

## 2025-02-26 NOTE — CARE COORDINATION
Chart reviewed by RNCM.    [facility] and [employer] that patient's [place] Medicaid cannot be used for home services in [place].    CM following.      Update 1422:  Patient with discharge orders for today. Patient has met all treatment goals and milestones for discharge. Family to provide transportation home. CM following until patient is discharged.     ASSESSMENT NOTE    Attending Physician: Gaby Medina MD  Admit Problem: Acute cholecystitis [K81.0]  Date/Time of Admission: 2/19/2025  6:35 PM  Problem List:  Patient Active Problem List   Diagnosis    Cholecystitis    Elevated LFTs    Hypertension    Acute pancreatitis, unspecified complication status, unspecified pancreatitis type    Type 2 diabetes mellitus (HCC)    Heart failure, unspecified (HCC)    Chronic obstructive pulmonary disease (HCC)    Pulmonary embolism (HCC)    Hematemesis    Pancreatitis    Abnormal LFTs    Cirrhosis (HCC)    Hepatosplenomegaly    Portal hypertension (HCC)    Splenic varices    Common bile duct stone    Acute cholecystitis    Sepsis (HCC)    Cholelithiasis    Class 1 obesity    History of pulmonary embolus (PE)    Diabetic polyneuropathy (HCC)    Urinary retention    Asthma    GERD (gastroesophageal reflux disease)    Liver abscess       Service Assessment  Patient Orientation Alert and Oriented   Cognition Alert   History Provided By Patient   Primary Caregiver Self   Accompanied By/Relationship n/a   Support Systems Spouse/Significant Other   Patient's Healthcare Decision Maker is: Legal Next of Kin   PCP Verified by CM Yes   Last Visit to PCP     Prior Functional Level Independent in ADLs/IADLs   Current Functional Level Independent in ADLs/IADLs   Can patient return to prior living arrangement Yes   Ability to make needs known: Good   Family able to assist with home care needs: Yes   Would you like for me to discuss the discharge plan with any other family members/significant

## 2025-02-26 NOTE — PLAN OF CARE
I met with patient at bedside, informed her about initiating transfer request to [facility] (inpatient or inpatient) for robotic cholecystectomy given recent acute cholecystitis with perforation and liver abscess.  Patient is adamant about going home first before going to anywhere else.  Patient has been transitioned to oral antibiotics by ID, they however recommended for patient to get transferred directly to [facility] if possible for source control.  Currently waiting for callback from [facility].  If they would not accept patient today, patient will be discharged home with oral antibiotics later today.

## 2025-03-03 ENCOUNTER — TELEPHONE (OUTPATIENT)
Dept: GASTROENTEROLOGY | Age: 60
End: 2025-03-03

## 2025-03-03 NOTE — TELEPHONE ENCOUNTER
Called pt to see if she knew when her jason would be done so we could time her next procedure.  N/A.  LVM for her to RTC.

## 2025-03-03 NOTE — TELEPHONE ENCOUNTER
Patient returned call from earlier today. She verbalized that she does not have a date for gallbladder surgery as of today but she plans to call [place] again to get surgery date. Advised patient to return call once she gets this information. She verbalized agreement.

## 2025-03-03 NOTE — TELEPHONE ENCOUNTER
----- Message from NICHOLAS YOUNG RN sent at 3/3/2025 10:54 AM EST -----  Jami : We can cancel the 3/10 procedure for this patient.  She had a procedure done inpt and is supposed to have a repeat after she has her gallbladder out.  I'll follow-up with her to see if she knows when that's going to be done, but I'm sure it's not before then. Thanks! -K

## 2025-03-10 ENCOUNTER — APPOINTMENT (OUTPATIENT)
Dept: GENERAL RADIOLOGY | Age: 60
End: 2025-03-10
Payer: COMMERCIAL

## 2025-03-10 ENCOUNTER — HOSPITAL ENCOUNTER (EMERGENCY)
Age: 60
Discharge: HOME | End: 2025-03-10
Payer: COMMERCIAL

## 2025-03-10 VITALS
TEMPERATURE: 98 F | SYSTOLIC BLOOD PRESSURE: 123 MMHG | OXYGEN SATURATION: 94 % | DIASTOLIC BLOOD PRESSURE: 74 MMHG | HEART RATE: 78 BPM | RESPIRATION RATE: 16 BRPM

## 2025-03-10 DIAGNOSIS — Z48.89 ENCOUNTER FOR POST SURGICAL WOUND CHECK: ICD-10-CM

## 2025-03-10 DIAGNOSIS — M70.71 BURSITIS OF RIGHT HIP, UNSPECIFIED BURSA: Primary | ICD-10-CM

## 2025-03-10 PROCEDURE — 73502 X-RAY EXAM HIP UNI 2-3 VIEWS: CPT

## 2025-03-10 PROCEDURE — 99283 EMERGENCY DEPT VISIT LOW MDM: CPT

## 2025-03-10 NOTE — ED PROVIDER NOTES
dailyHistorical Med      RABEprazole (ACIPHEX) 20 MG tablet Take 1 tablet by mouth 2 times dailyHistorical Med      simvastatin (ZOCOR) 10 MG tablet Take 1 tablet by mouth nightlyHistorical Med      tamsulosin (FLOMAX) 0.4 MG capsule Take 1 capsule by mouth dailyHistorical Med      traZODone (DESYREL) 50 MG tablet Take 1 tablet by mouth nightly as needed for SleepHistorical Med      acetaminophen (TYLENOL) 325 MG tablet Take 2 tablets by mouth every 8 hours as needed for PainHistorical Med      ketorolac (ACULAR) 0.5 % ophthalmic solution 1 drop 4 times dailyHistorical Med      nystatin (MYCOSTATIN) 381038 UNIT/GM cream Apply topically daily as needed for Dry Skin Apply topically 2 times daily., Topical, DAILY PRN, Historical Med      budesonide-formoterol (SYMBICORT) 80-4.5 MCG/ACT AERO 2 puffsHistorical Med      cetirizine (ZYRTEC) 10 MG tablet Take 1 tablet by mouth daily as neededHistorical Med      clotrimazole (LOTRIMIN) 1 % cream Apply topically 2 times daily, Topical, 2 TIMES DAILY Starting Tue 9/10/2024, Historical Med      diclofenac sodium (VOLTAREN) 1 % GEL Apply 1 Application topically daily as needed, Topical, DAILY PRN, Historical Med      docusate sodium (COLACE) 100 MG capsule 1 capsuleHistorical Med      empagliflozin (JARDIANCE) 25 MG tablet Take 1 tablet by mouth dailyHistorical Med      vitamin D (ERGOCALCIFEROL) 1.25 MG (83546 UT) CAPS capsule Take 1 capsule by mouth once a weekHistorical Med      gabapentin (NEURONTIN) 800 MG tablet 1 tablet Orally three times dailyHistorical Med      blood glucose test strips (FREESTYLE TEST STRIPS) strip 1 each 2 times dailyHistorical Med      metFORMIN (GLUCOPHAGE-XR) 500 MG extended release tablet Take 2 tablets by mouth daily (with breakfast)Historical Med       !! - Potential duplicate medications found. Please discuss with provider.           Results for orders placed or performed during the hospital encounter of 03/10/25   XR HIP RIGHT (2-3 VIEWS)

## 2025-03-10 NOTE — ED TRIAGE NOTES
Pt arrives to the ER with c/o irritation around gallbladder drain 1 week, R. Hip pain x 3 weeks r/t mechanical fall. Pt states pain with ambulation.

## 2025-03-10 NOTE — DISCHARGE INSTRUCTIONS
Continue to flush your drains twice a day keep the drain areas clean.  Follow low-fat diet.  Moist heat to the right lateral hip area use Tylenol or Motrin for any pain see your primary care physician for recheck of your hip keep upcoming appointment with your surgeon to monitor your abdomen and SHABBIR drains return for any worsening symptom

## 2025-03-11 ENCOUNTER — HOSPITAL ENCOUNTER (OUTPATIENT)
Dept: INTERVENTIONAL RADIOLOGY/VASCULAR | Age: 60
Discharge: HOME | End: 2025-03-13
Attending: RADIOLOGY
Payer: COMMERCIAL

## 2025-03-11 VITALS
DIASTOLIC BLOOD PRESSURE: 55 MMHG | SYSTOLIC BLOOD PRESSURE: 114 MMHG | TEMPERATURE: 98.7 F | HEART RATE: 84 BPM | OXYGEN SATURATION: 98 % | RESPIRATION RATE: 16 BRPM

## 2025-03-11 PROCEDURE — C1769 GUIDE WIRE: HCPCS

## 2025-03-11 PROCEDURE — 49424 ASSESS CYST CONTRAST INJECT: CPT

## 2025-03-11 PROCEDURE — 6360000004 HC RX CONTRAST MEDICATION: Performed by: RADIOLOGY

## 2025-03-11 PROCEDURE — 76080 X-RAY EXAM OF FISTULA: CPT | Performed by: RADIOLOGY

## 2025-03-11 PROCEDURE — 49424 ASSESS CYST CONTRAST INJECT: CPT | Performed by: RADIOLOGY

## 2025-03-11 RX ADMIN — IOHEXOL 30 ML: 300 INJECTION, SOLUTION INTRAVENOUS at 10:56

## 2025-03-11 ASSESSMENT — PAIN DESCRIPTION - LOCATION: LOCATION: HIP

## 2025-03-11 ASSESSMENT — PAIN SCALES - GENERAL: PAINLEVEL_OUTOF10: 10

## 2025-03-11 ASSESSMENT — PAIN DESCRIPTION - ORIENTATION: ORIENTATION: RIGHT

## 2025-03-11 NOTE — DISCHARGE INSTRUCTIONS
[facility]     Department of Interventional Radiology     [facility]    [phone] Office     [phone] Fax         DRAIN/PORT/CATHETER REMOVAL DISCHARGE INSTRUCTIONS           General Information:        Your doctor has ordered for us to remove your drain, port, or catheter. This could be that you do not need it anymore, it is not doing its job, your physician has decided on another plan for your treatment and/or it may need replacing.              Home Care Instructions:        You can resume your regular diet and medication regimen. Do not drink alcohol, drive, or make any important legal decisions in the next 24 hours. Do not lift anything heavier than a gallon of milk, or do anything strenuous for the next 24 hours. You will notice a dressing over the site of the removal. This dressing should stay in place until the site is healed. The dressing should be changed at least every 48 hours. You should change the dressing sooner if it becomes soiled or wet. The nurse who discharges you to home should review with you any wound care instructions. Resume your normal level of activity slowly. You may shower after 24 hours, but do not take a bath or swim or immerse yourself in water until the site has healed, and keep the dressing dry with plastic wrap while showering. The site may ooze for a couple of days. This drainage should lessen with each passing day.           Call If:        You should call your Physician and/or the Radiology Nurse if you have any bleeding other than a small spot on your bandage. Call if you have any signs of infection, fever, or increased pain at the site of the tube. Call if the oozing increases, if it changes color, or begins to have an odor.            Follow-Up Instructions: Please see your ordering doctor as he/she has requested.            To Reach Us:    If you have any questions about your procedure, please call the

## 2025-03-26 ENCOUNTER — HOSPITAL ENCOUNTER (OUTPATIENT)
Dept: INTERVENTIONAL RADIOLOGY/VASCULAR | Age: 60
Discharge: HOME | End: 2025-03-28
Attending: RADIOLOGY
Payer: COMMERCIAL

## 2025-03-26 VITALS
WEIGHT: 221 LBS | OXYGEN SATURATION: 96 % | SYSTOLIC BLOOD PRESSURE: 116 MMHG | HEART RATE: 86 BPM | HEIGHT: 69 IN | RESPIRATION RATE: 18 BRPM | DIASTOLIC BLOOD PRESSURE: 56 MMHG | BODY MASS INDEX: 32.73 KG/M2 | TEMPERATURE: 98.1 F

## 2025-03-26 DIAGNOSIS — K75.0 HEPATIC ABSCESS: ICD-10-CM

## 2025-03-26 PROCEDURE — 47536 EXCHANGE BILIARY DRG CATH: CPT

## 2025-03-26 PROCEDURE — 47536 EXCHANGE BILIARY DRG CATH: CPT | Performed by: RADIOLOGY

## 2025-03-26 PROCEDURE — 6370000000 HC RX 637 (ALT 250 FOR IP): Performed by: RADIOLOGY

## 2025-03-26 PROCEDURE — C1729 CATH, DRAINAGE: HCPCS

## 2025-03-26 PROCEDURE — 6360000004 HC RX CONTRAST MEDICATION: Performed by: RADIOLOGY

## 2025-03-26 PROCEDURE — 6360000002 HC RX W HCPCS: Performed by: RADIOLOGY

## 2025-03-26 RX ORDER — DIAZEPAM 5 MG/1
10 TABLET ORAL ONCE
Status: DISCONTINUED | OUTPATIENT
Start: 2025-03-26 | End: 2025-03-26

## 2025-03-26 RX ORDER — DIAZEPAM 5 MG/1
10 TABLET ORAL EVERY 6 HOURS PRN
Status: DISCONTINUED | OUTPATIENT
Start: 2025-03-26 | End: 2025-03-26

## 2025-03-26 RX ORDER — OXYCODONE HYDROCHLORIDE 5 MG/1
10 TABLET ORAL ONCE
Refills: 0 | Status: COMPLETED | OUTPATIENT
Start: 2025-03-26 | End: 2025-03-26

## 2025-03-26 RX ORDER — LIDOCAINE HYDROCHLORIDE 10 MG/ML
INJECTION, SOLUTION EPIDURAL; INFILTRATION; INTRACAUDAL; PERINEURAL PRN
Status: COMPLETED | OUTPATIENT
Start: 2025-03-26 | End: 2025-03-26

## 2025-03-26 RX ADMIN — LIDOCAINE HYDROCHLORIDE 5 ML: 10 INJECTION, SOLUTION EPIDURAL; INFILTRATION; INTRACAUDAL; PERINEURAL at 09:10

## 2025-03-26 RX ADMIN — IOHEXOL 5 ML: 300 INJECTION, SOLUTION INTRAVENOUS at 09:13

## 2025-03-26 RX ADMIN — DIAZEPAM 10 MG: 5 TABLET ORAL at 08:50

## 2025-03-26 RX ADMIN — OXYCODONE 10 MG: 5 TABLET ORAL at 08:50

## 2025-03-26 NOTE — BRIEF OP NOTE
[facility]  Department of Interventional Radiology  [phone]        Interventional Radiology Brief Procedure Note    Patient: Miguel A Zambrano MRN: [id number]  SSN: [id number]    YOB: 1965  Age: 60 y.o.  Sex: female      Date of Procedure: 3/26/2025    Pre-Procedure Diagnosis: cholecystitis    Post-Procedure Diagnosis: SAME    Procedure(s): Abscessogram    Brief Description of Procedure: GB drain exchange    Performed By: Camacho uMrillo MD     Assistants: None    Anesthesia:None    Estimated Blood Loss: None    Specimens:  None    Implants:  All Purpose Drain    Findings: numerous stones    Complications: None    Recommendations: gravity bag drainage     Follow Up: 4 weeks    Signed By: Camacho Murillo MD     March 26, 2025

## 2025-03-26 NOTE — DISCHARGE INSTRUCTIONS
If you have any questions about your procedure, please call the Interventional Radiology department at [phone].     After business hours (5pm) and weekends, call the answering service at [phone] and ask for the Interventional Radiologist on call to be paged.          [facility]     Department of Interventional Radiology     [facility]    [phone] Office     [phone] Fax       GENERAL DRAIN DISCHARGE INSTRUCTIONS           General Information:        A plastic catheter has been inserted through your skin and into area that needs to be drained. Your doctor ordered this procedure to be done in the event of an abscess, or other fluid collection in your body. You will notice a drainage bag attached to the catheter. When the fluid has all been removed, your doctor will send you back to us for the removal of the catheter. If you are going home with the catheter in place, your doctor may order a home health nurse to come to your house and assist with the care of the catheter. Your doctor will most likely order antibiotic tablets for you to take while you have this tube.              Home Care Instructions:        You can resume your regular diet and medication regimen. Do not drink alcohol, drive, or make any important legal decisions in the next 24 hours. Do not lift anything heavier than a gallon of milk, or do anything strenuous for the next 24 hours. You should not shower for 24 hours. You should cover the tube with plastic wrap and tape to keep it dry when you shower. You can disconnect the drainage bag while showering. The home health nurse or the nurse who discharges from the hospital will teach you how to do this.  Do not take a bath, swim or immerse yourself in water as long as you have this drainage tube. You should clean the tube and change the dressing every  48 hours and as needed. Keep the dressing clean and dry. Keep up with how much

## 2025-03-26 NOTE — OR NURSING
TRANSFER - OUT REPORT:           Verbal report given to Jody RN(name) on Miguel A Zambrano  being transferred to IR Recovery 1(unit) for  routine post-op and discharge.        Report consisted of patient’s Situation, Background, Assessment and      Recommendations(SBAR).          Information from the following report(s) SBAR and Procedure Summary was reviewed with the receiving nurse.       Opportunity for questions and clarification was provided.        Pt tolerated procedure well.

## 2025-03-31 ENCOUNTER — HOSPITAL ENCOUNTER (OUTPATIENT)
Dept: INTERVENTIONAL RADIOLOGY/VASCULAR | Age: 60
Discharge: HOME | End: 2025-04-02
Payer: COMMERCIAL

## 2025-03-31 VITALS
RESPIRATION RATE: 16 BRPM | TEMPERATURE: 98 F | HEART RATE: 96 BPM | DIASTOLIC BLOOD PRESSURE: 77 MMHG | SYSTOLIC BLOOD PRESSURE: 129 MMHG | OXYGEN SATURATION: 96 %

## 2025-03-31 DIAGNOSIS — L02.91 ABSCESS: ICD-10-CM

## 2025-03-31 PROCEDURE — 6360000004 HC RX CONTRAST MEDICATION: Performed by: PHYSICIAN ASSISTANT

## 2025-03-31 PROCEDURE — 2709999900 IR CHANGE DRAINAGE CATH

## 2025-03-31 PROCEDURE — C1729 CATH, DRAINAGE: HCPCS

## 2025-03-31 PROCEDURE — 6360000002 HC RX W HCPCS: Performed by: PHYSICIAN ASSISTANT

## 2025-03-31 RX ORDER — LIDOCAINE HYDROCHLORIDE 10 MG/ML
INJECTION, SOLUTION EPIDURAL; INFILTRATION; INTRACAUDAL; PERINEURAL PRN
Status: DISCONTINUED | OUTPATIENT
Start: 2025-03-31 | End: 2025-04-03 | Stop reason: HOSPADM

## 2025-03-31 RX ADMIN — IOHEXOL 25 ML: 300 INJECTION, SOLUTION INTRAVENOUS at 10:04

## 2025-03-31 RX ADMIN — LIDOCAINE HYDROCHLORIDE 10 ML: 10 INJECTION, SOLUTION EPIDURAL; INFILTRATION; INTRACAUDAL; PERINEURAL at 09:42

## 2025-04-09 ENCOUNTER — TELEPHONE (OUTPATIENT)
Dept: GASTROENTEROLOGY | Age: 60
End: 2025-04-09

## 2025-04-09 NOTE — TELEPHONE ENCOUNTER
----- Message from NICHOLAS YOUNG RN sent at 4/9/2025  9:34 AM EDT -----  Jami:  Looks like her jason is scheduled 5/23, and Megan wanted her to have an ERCP after her surgery - so maybe mid-June?  I think this lady is real hard to get in touch with if I remember correctly, so we should have plenty of time LOL  Dx: CBD stones    Thank you! -K

## 2025-04-22 ENCOUNTER — TELEPHONE (OUTPATIENT)
Dept: INTERVENTIONAL RADIOLOGY/VASCULAR | Age: 60
End: 2025-04-22

## 2025-04-22 NOTE — TELEPHONE ENCOUNTER
[] Phone call to: Patient    [] Number used to reach this person: [phone]    [] Voicemail reached: N/A     [] Appointment date:  4/28/25    [] Arrival time:  0800    [] Location given: yes    [] AM Medicine with a small sip of water: Yes    [] Patient is NPO: Yes    [] Need for : Yes    [] Anesthetic Moderate Sedation    [] Blood thinners held: No    [] Education on Hold requirements prior to procedure: NA     [] Allergies: OTHER: bee    [] Reviewed    [] Latex Allergy: No    [] Lidocaine Allergy: No    [] CPAP at night: No    [] Any recent infections: No    [] Diabetes: Yes    [] Additional information:  NA      [] Time taken to answer all questions    [] Call back phone number of [phone] given

## 2025-04-28 ENCOUNTER — HOSPITAL ENCOUNTER (OUTPATIENT)
Dept: INTERVENTIONAL RADIOLOGY/VASCULAR | Age: 60
Discharge: HOME | End: 2025-04-30
Attending: RADIOLOGY
Payer: COMMERCIAL

## 2025-04-28 VITALS
SYSTOLIC BLOOD PRESSURE: 110 MMHG | HEART RATE: 85 BPM | HEIGHT: 69 IN | OXYGEN SATURATION: 96 % | BODY MASS INDEX: 32.73 KG/M2 | WEIGHT: 221 LBS | RESPIRATION RATE: 16 BRPM | DIASTOLIC BLOOD PRESSURE: 56 MMHG | TEMPERATURE: 98.7 F

## 2025-04-28 DIAGNOSIS — K81.9 CHOLECYSTITIS: ICD-10-CM

## 2025-04-28 LAB
GLUCOSE BLD STRIP.AUTO-MCNC: 151 MG/DL (ref 65–100)
SERVICE CMNT-IMP: ABNORMAL

## 2025-04-28 PROCEDURE — 6360000004 HC RX CONTRAST MEDICATION: Performed by: RADIOLOGY

## 2025-04-28 PROCEDURE — 75984 XRAY CONTROL CATHETER CHANGE: CPT

## 2025-04-28 PROCEDURE — 47536 EXCHANGE BILIARY DRG CATH: CPT | Performed by: RADIOLOGY

## 2025-04-28 PROCEDURE — 99152 MOD SED SAME PHYS/QHP 5/>YRS: CPT | Performed by: RADIOLOGY

## 2025-04-28 PROCEDURE — 2580000003 HC RX 258: Performed by: RADIOLOGY

## 2025-04-28 PROCEDURE — 47536 EXCHANGE BILIARY DRG CATH: CPT

## 2025-04-28 PROCEDURE — 82962 GLUCOSE BLOOD TEST: CPT

## 2025-04-28 PROCEDURE — 6360000002 HC RX W HCPCS: Performed by: RADIOLOGY

## 2025-04-28 RX ORDER — FENTANYL CITRATE 50 UG/ML
INJECTION, SOLUTION INTRAMUSCULAR; INTRAVENOUS PRN
Status: COMPLETED | OUTPATIENT
Start: 2025-04-28 | End: 2025-04-28

## 2025-04-28 RX ORDER — SODIUM CHLORIDE 9 MG/ML
INJECTION, SOLUTION INTRAVENOUS CONTINUOUS PRN
Status: COMPLETED | OUTPATIENT
Start: 2025-04-28 | End: 2025-04-28

## 2025-04-28 RX ORDER — LIDOCAINE HYDROCHLORIDE 10 MG/ML
INJECTION, SOLUTION INFILTRATION; PERINEURAL PRN
Status: COMPLETED | OUTPATIENT
Start: 2025-04-28 | End: 2025-04-28

## 2025-04-28 RX ORDER — MIDAZOLAM HYDROCHLORIDE 2 MG/2ML
INJECTION, SOLUTION INTRAMUSCULAR; INTRAVENOUS PRN
Status: COMPLETED | OUTPATIENT
Start: 2025-04-28 | End: 2025-04-28

## 2025-04-28 RX ADMIN — MIDAZOLAM HYDROCHLORIDE 1 MG: 1 INJECTION, SOLUTION INTRAMUSCULAR; INTRAVENOUS at 10:49

## 2025-04-28 RX ADMIN — SODIUM CHLORIDE 75 ML/HR: 900 INJECTION, SOLUTION INTRAVENOUS at 10:45

## 2025-04-28 RX ADMIN — MIDAZOLAM HYDROCHLORIDE 1 MG: 1 INJECTION, SOLUTION INTRAMUSCULAR; INTRAVENOUS at 10:56

## 2025-04-28 RX ADMIN — FENTANYL CITRATE 50 MCG: 50 INJECTION, SOLUTION INTRAMUSCULAR; INTRAVENOUS at 10:56

## 2025-04-28 RX ADMIN — IOHEXOL 10 ML: 300 INJECTION, SOLUTION INTRAVENOUS at 11:00

## 2025-04-28 RX ADMIN — FENTANYL CITRATE 50 MCG: 50 INJECTION, SOLUTION INTRAMUSCULAR; INTRAVENOUS at 10:50

## 2025-04-28 RX ADMIN — LIDOCAINE HYDROCHLORIDE 10 ML: 10 INJECTION, SOLUTION INFILTRATION; PERINEURAL at 10:55
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
## 2025-04-28 NOTE — DISCHARGE INSTRUCTIONS
If you have any questions about your procedure, please call the Interventional Radiology department at [phone].   After business hours (5pm) and weekends, call the answering service at [phone] and ask for the Interventional Radiologist on call to be paged.

## 2025-04-28 NOTE — PROGRESS NOTES
TRANSFER - OUT REPORT:           Verbal report given to Doreen Lynn RN on Miguel A Zambrano  being transferred to IR Recovery for routine progression of patient care      Report consisted of patient’s Situation, Background, Assessment and Recommendations(SBAR).          Information from the following report(s) SBAR, Procedure Summary, and MAR was reviewed with the receiving nurse.       Opportunity for questions and clarification was provided.          Conscious Sedation:    100 Mcg of Fentanyl administered   2 Mg of Versed administered   0 Mg of Benadryl administered        Pt tolerated procedure well.          RUQ  4 x 4 gauze clean, dry, intact, and nontender

## 2025-04-28 NOTE — FLOWSHEET NOTE
Recovery period without difficulty. Pt alert and oriented and denies pain. Dressing is clean, dry, and intact. Vital signs and Quincy score completed. Pt declined wheelchair. Ambulatory off the unit with no distress observed.

## 2025-04-28 NOTE — OP NOTE
[facility]  Department of Interventional Radiology  [phone]        Interventional Radiology Brief Procedure Note    Patient: Miguel A Zambrano MRN: [id number]  SSN: [id number]    YOB: 1965  Age: 60 y.o.  Sex: female      Date of Procedure: 4/28/2025    Pre-Procedure Diagnosis: gallstones    Post-Procedure Diagnosis: SAME    Procedure(s): Image Guided Drain Placement    Brief Description of Procedure: exchange    Performed By: TL WOODWARD MD     Assistants: None    Anesthesia:Moderate Sedation    Estimated Blood Loss: Less than 10ml    Specimens:  None    Implants:  All Purpose Drain    Findings: numerous gallstones    Complications: None    Recommendations: external drainage     Follow Up: only if no surgery    Signed By: TL WOODWARD MD     April 28, 2025

## 2025-04-28 NOTE — PRE SEDATION
ketorolac (ACULAR) 0.5 % ophthalmic solution 1 drop 4 times daily    Sophia Shelton MD   nystatin (MYCOSTATIN) 878649 UNIT/GM cream Apply topically daily as needed for Dry Skin Apply topically 2 times daily.    Sophia Shelton MD   budesonide-formoterol (SYMBICORT) 80-4.5 MCG/ACT AERO 2 puffs    Sophia Shelton MD   cetirizine (ZYRTEC) 10 MG tablet Take 1 tablet by mouth daily as needed 5/13/24   Sophia Shelton MD   clotrimazole (LOTRIMIN) 1 % cream Apply topically 2 times daily 9/10/24   Sophia Shelton MD   diclofenac sodium (VOLTAREN) 1 % GEL Apply 1 Application topically daily as needed    Sophia Shelton MD   docusate sodium (COLACE) 100 MG capsule 1 capsule 5/13/24   Sophia Shelton MD   empagliflozin (JARDIANCE) 25 MG tablet Take 1 tablet by mouth daily 5/20/24   Sophia Shelton MD   vitamin D (ERGOCALCIFEROL) 1.25 MG (49040 UT) CAPS capsule Take 1 capsule by mouth once a week 5/30/24   Sophia Shelton MD   gabapentin (NEURONTIN) 800 MG tablet 1 tablet Orally three times daily 5/13/24   Sophia Shelton MD   blood glucose test strips (FREESTYLE TEST STRIPS) strip 1 each 2 times daily 11/18/24   Sophia Shelton MD   metFORMIN (GLUCOPHAGE-XR) 500 MG extended release tablet Take 2 tablets by mouth daily (with breakfast) 5/13/24   Sophia Shelton MD         Pre-Sedation Documentation and Exam:   I have personally completed a history, physical exam & review of systems for this patient (see notes).    Mallampati Airway Assessment:  Mallampati Class I - (soft palate, fauces, uvula & anterior/posterior tonsillar pillars are visible)    Prior History of Anesthesia Complications:   none    ASA Classification:  Class 3 - A patient with severe systemic disease that limits activity but is not incapacitating    Sedation/ Anesthesia Plan:   intravenous sedation    Medications Planned:   midazolam (Versed) intravenously and fentanyl

## 2025-04-30 ENCOUNTER — HOSPITAL ENCOUNTER (OUTPATIENT)
Dept: INTERVENTIONAL RADIOLOGY/VASCULAR | Age: 60
Discharge: HOME | End: 2025-05-02
Attending: RADIOLOGY
Payer: COMMERCIAL

## 2025-04-30 VITALS
OXYGEN SATURATION: 92 % | SYSTOLIC BLOOD PRESSURE: 116 MMHG | DIASTOLIC BLOOD PRESSURE: 62 MMHG | HEART RATE: 77 BPM | TEMPERATURE: 97.7 F | RESPIRATION RATE: 16 BRPM

## 2025-04-30 DIAGNOSIS — K83.09 CHOLANGITIS (HCC): ICD-10-CM

## 2025-04-30 PROCEDURE — 99152 MOD SED SAME PHYS/QHP 5/>YRS: CPT | Performed by: RADIOLOGY

## 2025-04-30 PROCEDURE — 75984 XRAY CONTROL CATHETER CHANGE: CPT

## 2025-04-30 PROCEDURE — 49423 EXCHANGE DRAINAGE CATHETER: CPT | Performed by: RADIOLOGY

## 2025-04-30 PROCEDURE — 6360000004 HC RX CONTRAST MEDICATION: Performed by: RADIOLOGY

## 2025-04-30 PROCEDURE — 75984 XRAY CONTROL CATHETER CHANGE: CPT | Performed by: RADIOLOGY

## 2025-04-30 PROCEDURE — 6360000002 HC RX W HCPCS: Performed by: RADIOLOGY

## 2025-04-30 PROCEDURE — 99153 MOD SED SAME PHYS/QHP EA: CPT

## 2025-04-30 RX ORDER — OXYCODONE HYDROCHLORIDE 5 MG/1
10 TABLET ORAL EVERY 4 HOURS PRN
Refills: 0 | Status: DISCONTINUED | OUTPATIENT
Start: 2025-04-30 | End: 2025-05-03 | Stop reason: HOSPADM

## 2025-04-30 RX ORDER — FENTANYL CITRATE 50 UG/ML
INJECTION, SOLUTION INTRAMUSCULAR; INTRAVENOUS PRN
Status: COMPLETED | OUTPATIENT
Start: 2025-04-30 | End: 2025-04-30

## 2025-04-30 RX ORDER — OXYCODONE HYDROCHLORIDE 5 MG/1
5 TABLET ORAL EVERY 4 HOURS PRN
Refills: 0 | Status: DISCONTINUED | OUTPATIENT
Start: 2025-04-30 | End: 2025-05-03 | Stop reason: HOSPADM

## 2025-04-30 RX ORDER — MIDAZOLAM HYDROCHLORIDE 2 MG/2ML
INJECTION, SOLUTION INTRAMUSCULAR; INTRAVENOUS PRN
Status: COMPLETED | OUTPATIENT
Start: 2025-04-30 | End: 2025-04-30

## 2025-04-30 RX ORDER — LIDOCAINE HYDROCHLORIDE 10 MG/ML
INJECTION, SOLUTION EPIDURAL; INFILTRATION; INTRACAUDAL; PERINEURAL PRN
Status: COMPLETED | OUTPATIENT
Start: 2025-04-30 | End: 2025-04-30

## 2025-04-30 RX ADMIN — IOHEXOL 30 ML: 300 INJECTION, SOLUTION INTRAVENOUS at 15:40

## 2025-04-30 RX ADMIN — LIDOCAINE HYDROCHLORIDE 10 ML: 10 INJECTION, SOLUTION EPIDURAL; INFILTRATION; INTRACAUDAL; PERINEURAL at 15:25

## 2025-04-30 RX ADMIN — FENTANYL CITRATE 50 MCG: 50 INJECTION, SOLUTION INTRAMUSCULAR; INTRAVENOUS at 15:20

## 2025-04-30 RX ADMIN — MIDAZOLAM HYDROCHLORIDE 1 MG: 1 INJECTION, SOLUTION INTRAMUSCULAR; INTRAVENOUS at 15:18

## 2025-04-30 RX ADMIN — FENTANYL CITRATE 50 MCG: 50 INJECTION, SOLUTION INTRAMUSCULAR; INTRAVENOUS at 15:17

## 2025-04-30 RX ADMIN — MIDAZOLAM HYDROCHLORIDE 0.5 MG: 1 INJECTION, SOLUTION INTRAMUSCULAR; INTRAVENOUS at 15:30

## 2025-04-30 RX ADMIN — MIDAZOLAM HYDROCHLORIDE 1 MG: 1 INJECTION, SOLUTION INTRAMUSCULAR; INTRAVENOUS at 15:20

## 2025-04-30 ASSESSMENT — PAIN - FUNCTIONAL ASSESSMENT: PAIN_FUNCTIONAL_ASSESSMENT: NONE - DENIES PAIN
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
## 2025-04-30 NOTE — H&P
needed for Pain, Disp: , Rfl:     ketorolac (ACULAR) 0.5 % ophthalmic solution, 1 drop 4 times daily, Disp: , Rfl:     nystatin (MYCOSTATIN) 627129 UNIT/GM cream, Apply topically daily as needed for Dry Skin Apply topically 2 times daily., Disp: , Rfl:     budesonide-formoterol (SYMBICORT) 80-4.5 MCG/ACT AERO, 2 puffs, Disp: , Rfl:     cetirizine (ZYRTEC) 10 MG tablet, Take 1 tablet by mouth daily as needed, Disp: , Rfl:     clotrimazole (LOTRIMIN) 1 % cream, Apply topically 2 times daily, Disp: , Rfl:     diclofenac sodium (VOLTAREN) 1 % GEL, Apply 1 Application topically daily as needed, Disp: , Rfl:     docusate sodium (COLACE) 100 MG capsule, 1 capsule, Disp: , Rfl:     empagliflozin (JARDIANCE) 25 MG tablet, Take 1 tablet by mouth daily, Disp: , Rfl:     vitamin D (ERGOCALCIFEROL) 1.25 MG (55799 UT) CAPS capsule, Take 1 capsule by mouth once a week, Disp: , Rfl:     gabapentin (NEURONTIN) 800 MG tablet, 1 tablet Orally three times daily, Disp: , Rfl:     blood glucose test strips (FREESTYLE TEST STRIPS) strip, 1 each 2 times daily, Disp: , Rfl:     metFORMIN (GLUCOPHAGE-XR) 500 MG extended release tablet, Take 2 tablets by mouth daily (with breakfast), Disp: , Rfl:      Allergies   Allergen Reactions    Bee Venom Anaphylaxis       History reviewed. No pertinent family history.  Social History     Tobacco Use    Smoking status: Never    Smokeless tobacco: Never   Substance Use Topics    Alcohol use: Not Currently          Objective:       Physical Examination:    Vitals:    04/30/25 1327   BP: (!) 184/92   Pulse: 78   Resp: 16   Temp: 97.7 °F (36.5 °C)   TempSrc: Tympanic   SpO2: 99%     General:  normal appearance, no distress, obese  Heart:  regular rate and rhythm and S1, S2 normal  Lungs:  clear to auscultation bilaterally  Abdomen:  protuberant, nondistended, normal bowel sounds, and nontender  Neuro:  normal without focal findings    Laboratory:     Lab Results   Component Value Date/Time

## 2025-04-30 NOTE — BRIEF OP NOTE
[facility]  Department of Interventional Radiology  [phone]        Brief Procedure Note    Patient: Miguel A Zambrano MRN: [id number]  SSN: [id number]    YOB: 1965  Age: 60 y.o.  Sex: female      Date of Procedure: 4/30/2025     Pre-Procedure Diagnosis:  GB drain pulled out of GB.     Post-Procedure Diagnosis:  SAME    Procedure(s):  Sinogram.  GB drain replacement.     Brief Description of Procedure:  as above    Performed By:  RIVAS LUNA MD     Assistants:  None    Anesthesia:  Moderate Sedation    Estimated Blood Loss:  None    Specimens:  None    Implants:  All Purpose Drain    Findings:  Hundreds of GB stones.  Contrast trickles thru the Cystic Duct.      Complications:  None    Recommendations:  Gravity bag.  Flush once per day.      Follow Up:  6-8 weeks.     Signed By: RIVAS LUNA MD     April 30, 2025

## 2025-04-30 NOTE — OR NURSING
Pt arrived with NO  but said she would have a  available to drive her home. At discharge she said she would sit in lobby and wait on friend to pick her up.. Pt was seen from upstairs window getting IN THE DRIVERS SEAT OF HER CAR in the handicap parking space, and driving out OF PARKING LOT AND UP THE ROAD. Let it be noted , pt DID receive conscience sedation for drain change procedure.   Initial (On Arrival)

## 2025-04-30 NOTE — OR NURSING
TRANSFER - OUT REPORT:           Verbal report given to Violet PETERSON  on Miguel A Zambrano  being transferred to IR Recovery for routine post-op      Report consisted of patient’s Situation, Background, Assessment and Recommendations(SBAR).      Information from the following report(s) SBAR, Procedure Summary, and MAR was reviewed with the receiving nurse.     Opportunity for questions and clarification was provided.      Conscious Sedation:    125 Mcg of Fentanyl administered   2.5 Mg of Versed administered   0 Mg of Benadryl administered      Pt tolerated procedure well.      RUQ 4 x 4 gauze dressing is clean, dry, intact, and nontender    VITALS:  /72   Pulse 81   Temp 97.7 °F (36.5 °C) (Tympanic)   Resp (!) 0   SpO2 100%

## 2025-04-30 NOTE — DISCHARGE INSTRUCTIONS
Recovery period without difficulty. Pt alert and oriented and denies pain. Dressing is clean, dry, and intact. Reviewed discharge instructions with patient and she  verbalized understanding. Pt escorted to lobby discharge area via wheelchair. Vital signs and Quincy score completed.

## 2025-05-11 ENCOUNTER — HOSPITAL ENCOUNTER (EMERGENCY)
Age: 60
Discharge: HOME | End: 2025-05-11
Payer: COMMERCIAL

## 2025-05-11 ENCOUNTER — APPOINTMENT (OUTPATIENT)
Dept: CT IMAGING | Age: 60
End: 2025-05-11
Payer: COMMERCIAL

## 2025-05-11 VITALS
HEART RATE: 81 BPM | SYSTOLIC BLOOD PRESSURE: 132 MMHG | TEMPERATURE: 97.7 F | HEIGHT: 69 IN | DIASTOLIC BLOOD PRESSURE: 75 MMHG | BODY MASS INDEX: 32.64 KG/M2 | RESPIRATION RATE: 16 BRPM | OXYGEN SATURATION: 100 %

## 2025-05-11 DIAGNOSIS — Z51.89 VISIT FOR WOUND CHECK: Primary | ICD-10-CM

## 2025-05-11 PROCEDURE — 99284 EMERGENCY DEPT VISIT MOD MDM: CPT

## 2025-05-11 PROCEDURE — 6370000000 HC RX 637 (ALT 250 FOR IP)

## 2025-05-11 PROCEDURE — 74176 CT ABD & PELVIS W/O CONTRAST: CPT

## 2025-05-11 RX ORDER — CEPHALEXIN 500 MG/1
500 CAPSULE ORAL 2 TIMES DAILY
Qty: 14 CAPSULE | Refills: 0 | Status: SHIPPED | OUTPATIENT
Start: 2025-05-11 | End: 2025-05-18

## 2025-05-11 RX ORDER — CEPHALEXIN 500 MG/1
500 CAPSULE ORAL
Status: COMPLETED | OUTPATIENT
Start: 2025-05-11 | End: 2025-05-11

## 2025-05-11 RX ADMIN — CEPHALEXIN 500 MG: 500 CAPSULE ORAL at 18:18

## 2025-05-11 ASSESSMENT — PAIN SCALES - GENERAL: PAINLEVEL_OUTOF10: 9

## 2025-05-11 ASSESSMENT — PAIN DESCRIPTION - LOCATION: LOCATION: ABDOMEN

## 2025-05-11 ASSESSMENT — PAIN - FUNCTIONAL ASSESSMENT: PAIN_FUNCTIONAL_ASSESSMENT: 0-10

## 2025-05-11 NOTE — ED PROVIDER NOTES
Emergency Department Provider Note       [facility] EMERGENCY DEPT   PCP: No primary care provider on file.   Age: 60 y.o.   Sex: female     DISPOSITION Decision To Discharge 05/11/2025 06:02:12 PM    ICD-10-CM    1. Visit for wound check  Z51.89           Medical Decision Making     In summary, this is a well-appearing nonacute 60-year-old female with gallbladder drain coming in emergency department for complaints of pain surrounding site and mild drainage when flushing.  No surrounding erythema.  No abscess appreciated on exam as she does not have any induration or fluctuance noted.  CT scan obtained given recurrence of drain dislodgment.  CT scan showing that the drain is in proper place although there is some mild inflammatory changes along the tract.  I spoke to on-call interventional radiologist, Dr. Bah who reviewed findings and recommended antibiotics and outpatient follow-up as drain is in place.  I will place patient on Keflex.  Instructed to continue daily regimen of medications.  Instructed to follow-up with IR this week for drain recheck. Findings here today and plan moving forward discussed at length with patient and family which they are in agreement with.  Very strict return precautions were discussed which he verbalized understanding.  Broad differentials were considered during ED course.   ED Course as of 05/11/25 1809   Sun May 11, 2025   1621 I have reached out to interventional radiology given patient's complaint of leaking around GB drain and significant pain and difficulty flushing.  Awaiting response. [TC]   1807 I spoke with Dr. Bah with IR and reviewed imaging findings.  He recommended starting on antibiotics given that there is some inflammatory changes along the tract.  Recommended follow-up in office this week for recheck.  This was relayed to patient.  Will start on Keflex. [TC]      ED Course User Index  [TC] Juan Goel, JULIA - NP     1 acute, uncomplicated illness or

## 2025-05-11 NOTE — ED TRIAGE NOTES
Patient arrived with a complaint of a wound site leaking from active dressing. Patient would like an evaluation due to drainage tube being present.

## 2025-05-11 NOTE — DISCHARGE INSTRUCTIONS
As we discussed, your drain is in proper positioning.  I will start you on a course of antibiotics as there is some mild inflammation surrounding the tract.  Please begin tomorrow.  Please follow-up with interventional radiology this week for drain recheck.  As we discussed, please return to emergency department for any new, worsening, concerning symptoms.

## 2025-05-20 ENCOUNTER — TRANSCRIBE ORDERS (OUTPATIENT)
Dept: INTERVENTIONAL RADIOLOGY/VASCULAR | Age: 60
End: 2025-05-20

## 2025-05-20 DIAGNOSIS — K81.9 CHOLECYSTITIS: Primary | ICD-10-CM

## 2025-05-21 ENCOUNTER — HOSPITAL ENCOUNTER (OUTPATIENT)
Dept: INTERVENTIONAL RADIOLOGY/VASCULAR | Age: 60
Discharge: HOME | End: 2025-05-23
Payer: COMMERCIAL

## 2025-05-21 VITALS
WEIGHT: 221 LBS | HEIGHT: 69 IN | DIASTOLIC BLOOD PRESSURE: 59 MMHG | TEMPERATURE: 97.5 F | HEART RATE: 81 BPM | SYSTOLIC BLOOD PRESSURE: 120 MMHG | BODY MASS INDEX: 32.73 KG/M2 | OXYGEN SATURATION: 93 % | RESPIRATION RATE: 18 BRPM

## 2025-05-21 DIAGNOSIS — K81.9 CHOLECYSTITIS: ICD-10-CM

## 2025-05-21 PROCEDURE — 6370000000 HC RX 637 (ALT 250 FOR IP): Performed by: RADIOLOGY

## 2025-05-21 PROCEDURE — 47531 INJECTION FOR CHOLANGIOGRAM: CPT | Performed by: RADIOLOGY

## 2025-05-21 PROCEDURE — 6360000004 HC RX CONTRAST MEDICATION: Performed by: RADIOLOGY

## 2025-05-21 PROCEDURE — C1769 GUIDE WIRE: HCPCS

## 2025-05-21 PROCEDURE — 6360000002 HC RX W HCPCS: Performed by: RADIOLOGY

## 2025-05-21 RX ORDER — LIDOCAINE HYDROCHLORIDE 10 MG/ML
INJECTION, SOLUTION EPIDURAL; INFILTRATION; INTRACAUDAL; PERINEURAL PRN
Status: COMPLETED | OUTPATIENT
Start: 2025-05-21 | End: 2025-05-21

## 2025-05-21 RX ORDER — OXYCODONE HYDROCHLORIDE 5 MG/1
TABLET ORAL PRN
Status: COMPLETED | OUTPATIENT
Start: 2025-05-21 | End: 2025-05-21

## 2025-05-21 RX ORDER — DIAZEPAM 2 MG/1
TABLET ORAL PRN
Status: COMPLETED | OUTPATIENT
Start: 2025-05-21 | End: 2025-05-21

## 2025-05-21 RX ADMIN — IOHEXOL 10 ML: 300 INJECTION, SOLUTION INTRAVENOUS at 12:00

## 2025-05-21 RX ADMIN — LIDOCAINE HYDROCHLORIDE 5 ML: 10 INJECTION, SOLUTION EPIDURAL; INFILTRATION; INTRACAUDAL; PERINEURAL at 11:57

## 2025-05-21 RX ADMIN — OXYCODONE 10 MG: 5 TABLET ORAL at 11:03

## 2025-05-21 RX ADMIN — DIAZEPAM 10 MG: 2 TABLET ORAL at 11:03

## 2025-05-21 ASSESSMENT — PAIN - FUNCTIONAL ASSESSMENT: PAIN_FUNCTIONAL_ASSESSMENT: 0-10

## 2025-05-21 NOTE — BRIEF OP NOTE
[facility]  Department of Interventional Radiology  [phone]        Brief Procedure Note    Patient: Miguel A Zambrano MRN: [id number]  SSN: [id number]    YOB: 1965  Age: 60 y.o.  Sex: female      Date of Procedure: 5/21/2025     Pre-Procedure Diagnosis:  Clogged Perc Peggy tube.  Cholecystectomy at [facility] scheduled for Friday.     Post-Procedure Diagnosis:  SAME    Procedure(s):  Drain flush and evaluation    Brief Description of Procedure:  as above    Performed By:  RIVAS LUNA MD     Assistants:  None    Anesthesia:  Anxiolysis    Estimated Blood Loss:  None    Specimens:  None    Implants:  All Purpose Drain left in place.  [employer] quick disconnect added.     Findings:  Occluded drain cleared w 5cc syringe high pressure injection.      Complications:  None    Recommendations:  Gravity bag.      Follow Up:  [place] for surgery later this week.    Signed By: RIVAS LUNA MD     May 21, 2025

## 2025-05-21 NOTE — DISCHARGE INSTRUCTIONS
[facility]     Department of Interventional Radiology     [facility]    [phone] Office     [phone] Fax       GENERAL DRAIN DISCHARGE INSTRUCTIONS           General Information:        A plastic catheter has been inserted through your skin and into area that needs to be drained. Your doctor ordered this procedure to be done in the event of an abscess, or other fluid collection in your body. You will notice a drainage bag attached to the catheter. When the fluid has all been removed, your doctor will send you back to us for the removal of the catheter. If you are going home with the catheter in place, your doctor may order a home health nurse to come to your house and assist with the care of the catheter. Your doctor will most likely order antibiotic tablets for you to take while you have this tube.              Home Care Instructions:        You can resume your regular diet and medication regimen. Do not drink alcohol, drive, or make any important legal decisions in the next 24 hours. Do not lift anything heavier than a gallon of milk, or do anything strenuous for the next 24 hours. You should not shower for 24 hours. You should cover the tube with plastic wrap and tape to keep it dry when you shower. You can disconnect the drainage bag while showering. The home health nurse or the nurse who discharges from the hospital will teach you how to do this.  Do not take a bath, swim or immerse yourself in water as long as you have this drainage tube. You should clean the tube and change the dressing every  48 hours and as needed. Keep the dressing clean and dry. Keep up with how much drainage you get from the tube and report this to your doctor on each visit.           Call If:        You should call your Physician and/or the Radiology Nurse if you have any bleeding other than a small spot on your bandage. Call if you have any signs of infection, fever,

## 2025-06-03 ENCOUNTER — TELEPHONE (OUTPATIENT)
Age: 60
End: 2025-06-03

## 2025-06-03 NOTE — TELEPHONE ENCOUNTER
Rc'd message from Elissa:  Good afternoon,   Pt is requesting to speak to Dr. Guzman before completing her PAT call for her procedure on 06/16/2025. She states that she had a complicated cholecystectomy on 05/23/2025 at [facility] and would like to make sure that it is still okay to proceed and that the procedure is still needed. She was instructed to call the office.   Thanks,     On chart review, it was unclear that the stent that Dr Guzman placed had been removed.     RTC to patient to discuss with n/a.  LVM to RTC to direct RN line.

## 2025-06-03 NOTE — TELEPHONE ENCOUNTER
Pt called; put through to surgery scheduler.   Pt called; recently had gallbladder surgery; may need to push out the GI procedure.   Pt calling to check if it needs to be rescheduled.

## 2025-07-16 RX ORDER — ALBUTEROL SULFATE 90 UG/1
2 INHALANT RESPIRATORY (INHALATION) EVERY 4 HOURS PRN
COMMUNITY

## 2025-07-16 RX ORDER — MELOXICAM 15 MG/1
15 TABLET ORAL 2 TIMES DAILY
COMMUNITY

## 2025-07-16 RX ORDER — METHOCARBAMOL 750 MG/1
750 TABLET, FILM COATED ORAL 4 TIMES DAILY
COMMUNITY

## 2025-07-16 RX ORDER — CHOLESTYRAMINE 4 G/9G
1 POWDER, FOR SUSPENSION ORAL
COMMUNITY

## 2025-07-16 NOTE — PERIOP NOTE
Patient verified name and .  Order for consent  was not found in EHR and does not match case posting; patient verifies procedure.   Type 1B surgery, PHONE assessment complete.  Orders NOT received.  Labs per surgeon: NONE  Labs per anesthesia protocol: POC GLUCOSE (DOS)    Patient answered medical/surgical history questions at their best of ability. All prior to admission medications documented in EPIC.    Patient instructed to continue taking all prescription medications up to the day of surgery but to take only the following medications the day of surgery according to anesthesia guidelines with a small sip of water:     Oxycodone IR if needed   gabapentin (Neurontin)   Duloxetine (Cymbalta)    Metformin (Glucophage)   Tamsulosin (Flomax)   Rabeprazole (Aciphex)      LANTUS:    DAY BEFORE SURGERY- PLEASE TAKE REGULAR UNIT AMOUNT.   DAY OF SURGERY, ONLY TAKE 80% (56 UNITS)    YOU MAY TAKE YOU HUMALOG LIKE YOU NORMALLY DO ON DAY BEFORE SURGERY, HOWEVER PLEASE DO NOT TAKE ANY UNTIL AFTER PROCEDURE ON SURGERY DAY.       Also, patient is requested to take 2 Tylenol in the morning and then again before bed on the day before surgery. Regular or extra strength may be used.       Patient informed that all vitamins and supplements should be held 7 days prior to surgery and NSAIDS 5 days prior to surgery. Prescription meds to hold:    Meloxicam (Mobic) - Please stop as of 25  Hold empaglifloxin (Jardiance) beginning 3 days prior to surgery   Cholestyramine (Questran)- Please hold until after surgery.   Furosemide (Lasix) - Please do not take this medication on the day of surgery.   Docusate sodium (Colace)  Cyclobenzaprine (Flexeril) -Please hold as of 25  Methocarbamol (Robaxin)- Please hold as of 25          Patient instructed on the following:    > Arrive at main Entrance, time of arrival to be called the day before by 1700  > No food after midnight, patient may drink clear liquids up until 2 hours

## 2025-07-25 NOTE — PERIOP NOTE
Preop department called to notify patient of arrival time for scheduled procedure. Instructions given to   - Arrive at [facility] Entrance [address].  - Nothing to eat after midnight unless otherwise indicated. No gum, mints, or ice chips. You may have clear liquids two hours prior to arrival to the hospital.   - Have a responsible adult to drive patient to the hospital, stay during surgery, and patient will need supervision 24 hours after anesthesia.   - Use antibacterial soap in shower the night before surgery and on the morning of surgery.       Patient rescheduling for Friday 8/1/25

## 2025-07-27 ENCOUNTER — ANESTHESIA EVENT (OUTPATIENT)
Dept: SURGERY | Age: 60
End: 2025-07-27
Payer: COMMERCIAL

## 2025-07-28 ENCOUNTER — TELEPHONE (OUTPATIENT)
Dept: GASTROENTEROLOGY | Age: 60
End: 2025-07-28

## 2025-07-28 ENCOUNTER — ANESTHESIA (OUTPATIENT)
Dept: SURGERY | Age: 60
End: 2025-07-28
Payer: COMMERCIAL

## 2025-07-28 NOTE — TELEPHONE ENCOUNTER
Rc'd message from Dr. Guzman that pt needed to be moved to Friday due to transportation.    Placed call to pt to r/s procedure to Friday.  Pt was unsure as to whether she would have a ride or not.  She was encouraged to attempt to find transportation, as she would absolutely not be able to drive herself home that day.    Pt stated she would work on getting transport.  She was instructed to call RN direct line on Wednesday to update, and if she was still unable to arrange transportation, perhaps she could be admitted for observation for 24 hours post procedure.    She verbalized understanding.

## 2025-07-31 RX ORDER — OXYCODONE HYDROCHLORIDE 5 MG/1
10 TABLET ORAL PRN
Refills: 0 | Status: CANCELLED | OUTPATIENT
Start: 2025-07-31 | End: 2025-07-31

## 2025-07-31 RX ORDER — IPRATROPIUM BROMIDE AND ALBUTEROL SULFATE 2.5; .5 MG/3ML; MG/3ML
1 SOLUTION RESPIRATORY (INHALATION)
Status: CANCELLED | OUTPATIENT
Start: 2025-07-31

## 2025-07-31 RX ORDER — PROCHLORPERAZINE EDISYLATE 5 MG/ML
5 INJECTION INTRAMUSCULAR; INTRAVENOUS
Status: CANCELLED | OUTPATIENT
Start: 2025-07-31

## 2025-07-31 RX ORDER — LABETALOL HYDROCHLORIDE 5 MG/ML
10 INJECTION, SOLUTION INTRAVENOUS
Status: CANCELLED | OUTPATIENT
Start: 2025-07-31

## 2025-07-31 RX ORDER — OXYCODONE HYDROCHLORIDE 5 MG/1
5 TABLET ORAL PRN
Refills: 0 | Status: CANCELLED | OUTPATIENT
Start: 2025-07-31 | End: 2025-07-31

## 2025-07-31 RX ORDER — HALOPERIDOL 5 MG/ML
1 INJECTION INTRAMUSCULAR
Status: CANCELLED | OUTPATIENT
Start: 2025-07-31

## 2025-08-01 ENCOUNTER — CLINICAL DOCUMENTATION (OUTPATIENT)
Dept: GASTROENTEROLOGY | Age: 60
End: 2025-08-01

## 2025-08-01 ENCOUNTER — HOSPITAL ENCOUNTER (OUTPATIENT)
Age: 60
Setting detail: OUTPATIENT SURGERY
Discharge: HOME | End: 2025-08-01
Attending: INTERNAL MEDICINE | Admitting: INTERNAL MEDICINE
Payer: COMMERCIAL

## 2025-08-01 ENCOUNTER — APPOINTMENT (OUTPATIENT)
Dept: GENERAL RADIOLOGY | Age: 60
End: 2025-08-01
Attending: INTERNAL MEDICINE
Payer: COMMERCIAL

## 2025-08-01 VITALS
SYSTOLIC BLOOD PRESSURE: 107 MMHG | DIASTOLIC BLOOD PRESSURE: 63 MMHG | HEIGHT: 70 IN | HEART RATE: 91 BPM | OXYGEN SATURATION: 93 % | WEIGHT: 224.6 LBS | RESPIRATION RATE: 18 BRPM | BODY MASS INDEX: 32.16 KG/M2

## 2025-08-01 LAB
GLUCOSE BLD STRIP.AUTO-MCNC: 161 MG/DL (ref 65–100)
SERVICE CMNT-IMP: ABNORMAL

## 2025-08-01 PROCEDURE — 82962 GLUCOSE BLOOD TEST: CPT

## 2025-08-01 RX ORDER — SODIUM CHLORIDE 0.9 % (FLUSH) 0.9 %
5-40 SYRINGE (ML) INJECTION EVERY 12 HOURS SCHEDULED
Status: DISCONTINUED | OUTPATIENT
Start: 2025-08-01 | End: 2025-08-01 | Stop reason: HOSPADM

## 2025-08-01 RX ORDER — SODIUM CHLORIDE, SODIUM LACTATE, POTASSIUM CHLORIDE, CALCIUM CHLORIDE 600; 310; 30; 20 MG/100ML; MG/100ML; MG/100ML; MG/100ML
INJECTION, SOLUTION INTRAVENOUS CONTINUOUS
Status: DISCONTINUED | OUTPATIENT
Start: 2025-08-01 | End: 2025-08-01 | Stop reason: HOSPADM

## 2025-08-01 RX ORDER — SODIUM CHLORIDE 9 MG/ML
INJECTION, SOLUTION INTRAVENOUS PRN
Status: DISCONTINUED | OUTPATIENT
Start: 2025-08-01 | End: 2025-08-01 | Stop reason: HOSPADM

## 2025-08-01 RX ORDER — LIDOCAINE HYDROCHLORIDE 10 MG/ML
1 INJECTION, SOLUTION INFILTRATION; PERINEURAL
Status: DISCONTINUED | OUTPATIENT
Start: 2025-08-01 | End: 2025-08-01 | Stop reason: HOSPADM

## 2025-08-01 RX ORDER — SODIUM CHLORIDE 0.9 % (FLUSH) 0.9 %
5-40 SYRINGE (ML) INJECTION PRN
Status: DISCONTINUED | OUTPATIENT
Start: 2025-08-01 | End: 2025-08-01 | Stop reason: HOSPADM

## 2025-08-01 ASSESSMENT — PAIN - FUNCTIONAL ASSESSMENT: PAIN_FUNCTIONAL_ASSESSMENT: 0-10

## 2025-08-01 NOTE — ADDENDUM NOTE
Addendum  created 08/01/25 1419 by David Hernandez APRN - [facility]    Intraprocedure Staff edited
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
## 2025-08-01 NOTE — H&P
Gastroenterology and Interventional Endoscopy Pre-procedure HPI    Date of visit   :  08/01/25      Patient name :  Miguel A Zambrano  YOB: 1965    HPI:    Patient is a 60 y.o. year old female, who has been referred  for ERCP with plan for stent removal    ____________________      Past Medical History:  Reviewed, and in prior HPI,documentation    Surgical History:    Reviewed, and in prior HPI,documentation    Medications:    Reviewed, and in prior HPI,documentation    Allergies:  Allergies   Allergen Reactions    Bee Venom Anaphylaxis    Wasp Venom Protein Anaphylaxis and Swelling     Other Reaction(s): Anaphylactic shock-Allergy    Bumble bee in childhood       Social History:    Reviewed, and in prior HPI,documentation    Family History:    Reviewed, and in prior HPI,documentation  Physical Exam:    There were no vitals filed for this visit.  Body mass index is 32.28 kg/m².  [unfilled]      Constitutional: Alert, oriented.  No acute distress  Head: Normocephalic and Atraumatic  Eyes: No icterus, EOMI, no congestion  ENT: No deformity, no hearing loss   Cardiovascular: Regular rate and rhythm, S1-S2 normal, no murmur rub or gallop  Respiratory: Clear to auscultation bilaterally no wheezing rhonchi or crackles  Gastrointestinal:, No hepatosplenomegaly nontender nondistended bowel sounds present in all 4 quadrants  Musculoskeletal: No joint deformity, no swelling in larger joints including knees elbows hands shoulders  Skin: No new rash.  Neurologic: Alert oriented to time place and person , good affect  ____________________    Recommendations:  --Plan for ERCP with plan for stenr removal    Kenneth Guzman MD  Gastroenterology and Interventional Endoscopy

## 2025-08-01 NOTE — PROGRESS NOTES
Brief GI note    Procedure cancelled as patient was eating in pre-op. Will re-schedule in ~2 weeks      Kenneth Guzman MD  Gastroenterology and Interventional Endoscopy

## 2025-08-01 NOTE — PROGRESS NOTES
Pending bed request placed 7/31; patient having ERCP/EUS on 8/1 for evaluation of CBD stent removal (hx of CBD stones) Requesting 24 hour admission as patient does not have transportation from procedure.     EL Marcano

## (undated) DEVICE — BLOCK BITE AD 60FR W/ VELC STRP ADDRESSES MOST PT AND

## (undated) DEVICE — ELECTRODE PT RET AD L9FT HI MOIST COND ADH HYDRGEL CORDED

## (undated) DEVICE — KENDALL RADIOLUCENT FOAM MONITORING ELECTRODE RECTANGULAR SHAPE: Brand: KENDALL

## (undated) DEVICE — MOUTHPIECE ENDOSCP L CTRL OPN AND SIDE PORTS DISP

## (undated) DEVICE — SINGLE PORT MANIFOLD: Brand: NEPTUNE 2

## (undated) DEVICE — SYRINGE MED 10ML LUERLOCK TIP W/O SFTY DISP

## (undated) DEVICE — GARMENT,MEDLINE,DVT,INT,CALF,MED, GEN2: Brand: MEDLINE

## (undated) DEVICE — CONNECTOR TBNG OD5-7MM O2 END DISP

## (undated) DEVICE — ENDOSCOPIC KIT 1.1+ OP4 CA DE 2 GWN AAMI LEVEL 3

## (undated) DEVICE — RESCUE COMBO FORCEPS

## (undated) DEVICE — LUBE JELLY FOIL PACK 1.4 OZ: Brand: MEDLINE INDUSTRIES, INC.

## (undated) DEVICE — 1200 GUARD II KIT W/5MM TUBE W/O VAC TUBE: Brand: GUARDIAN

## (undated) DEVICE — DUODENOSCOPE STRL SNGLE USE EXALT CNTRLR BX 2

## (undated) DEVICE — SPHINCTEROTOME: Brand: JAGTOME RX 44

## (undated) DEVICE — AIRLIFE™ OXYGEN TUBING 7 FEET (2.1 M) CRUSH RESISTANT OXYGEN TUBING, VINYL TIPPED: Brand: AIRLIFE™

## (undated) DEVICE — BALLOON US E LIN RNG O KT FOR FG-32UA

## (undated) DEVICE — ACCESS AND DELIVERY CATHETER: Brand: SPYSCOPE™ DS II

## (undated) DEVICE — NEEDLE SYRINGE 18GA L1.5IN RED PLAS HUB S STL BLNT FILL W/O

## (undated) DEVICE — GAUZE,SPONGE,4"X4",12PLY,WOVEN,NS,LF: Brand: MEDLINE

## (undated) DEVICE — Z DISCONTINUED CANNULA NSL ORAL AD FOR CAPNOFLEX CO2 O2 AIRLFE

## (undated) DEVICE — RETRIEVAL BALLOON CATHETER: Brand: EXTRACTOR™ PRO RX

## (undated) DEVICE — YANKAUER,BULB TIP,W/O VENT,RIGID,STERILE: Brand: MEDLINE

## (undated) DEVICE — SYRINGE MEDICAL 3ML CLEAR PLASTIC STANDARD NON CONTROL LUERLOCK TIP DISPOSABLE